# Patient Record
Sex: MALE | Race: WHITE | NOT HISPANIC OR LATINO | Employment: FULL TIME | URBAN - METROPOLITAN AREA
[De-identification: names, ages, dates, MRNs, and addresses within clinical notes are randomized per-mention and may not be internally consistent; named-entity substitution may affect disease eponyms.]

---

## 2020-04-21 ENCOUNTER — APPOINTMENT (OUTPATIENT)
Dept: RADIOLOGY | Facility: IMAGING CENTER | Age: 65
End: 2020-04-21
Attending: FAMILY MEDICINE
Payer: COMMERCIAL

## 2020-04-21 ENCOUNTER — OFFICE VISIT (OUTPATIENT)
Dept: URGENT CARE | Facility: CLINIC | Age: 65
End: 2020-04-21
Payer: COMMERCIAL

## 2020-04-21 VITALS
SYSTOLIC BLOOD PRESSURE: 160 MMHG | WEIGHT: 200 LBS | RESPIRATION RATE: 18 BRPM | DIASTOLIC BLOOD PRESSURE: 90 MMHG | OXYGEN SATURATION: 96 % | HEIGHT: 70 IN | HEART RATE: 81 BPM | BODY MASS INDEX: 28.63 KG/M2 | TEMPERATURE: 99 F

## 2020-04-21 DIAGNOSIS — R03.0 ELEVATED BLOOD PRESSURE READING: ICD-10-CM

## 2020-04-21 DIAGNOSIS — R07.81 RIB PAIN: ICD-10-CM

## 2020-04-21 PROCEDURE — 99214 OFFICE O/P EST MOD 30 MIN: CPT | Performed by: FAMILY MEDICINE

## 2020-04-21 PROCEDURE — 93000 ELECTROCARDIOGRAM COMPLETE: CPT | Performed by: FAMILY MEDICINE

## 2020-04-21 PROCEDURE — 71101 X-RAY EXAM UNILAT RIBS/CHEST: CPT | Mod: TC,FY,LT | Performed by: FAMILY MEDICINE

## 2020-04-21 ASSESSMENT — PAIN SCALES - GENERAL: PAINLEVEL: 6=MODERATE PAIN

## 2020-04-21 NOTE — PROGRESS NOTES
"Subjective:         Chief Complaint   Patient presents with   • Abdominal Pain     x3 wks pt states pain is located under rib cage on left side                  Rib Pain       Pt c/o sharp, constant left lower ribcage pain x 3 wks.    Pain is intermittent, worse with certain movements but no worse with deep breathing.       Pain  improved with aspirin.        denies dyspnea.       Pertinent negatives include no claudication, cough, exertional chest pressure, fever, nausea, near-syncope, numbness, syncope or vomiting.        Past medical history was unremarkable and not pertinent to current issue      Social History     Tobacco Use   • Smoking status: Not on file   Substance Use Topics   • Alcohol use: Not on file   • Drug use: Not on file         Family history was reviewed and not pertinent       Review of Systems:  Review of Systems   Constitutional: Negative for fever.   HENT: no neck pain, headache or dizziness  Eyes: denies vision changes  Respiratory: no cough, congestion, SOB  Cardiovascular: denies palpations     Gastrointestinal: denies diarrhea, abdominal pain or constipation.  No blood in stool.  Musculoskeletal: denies back pain or joint pain    Skin: no itching or rash  Neurological: No numbness or tingling.   10 point ROS otherwise negative, except per HPI         Objective:     /90   Pulse 81   Temp 37.2 °C (99 °F)   Resp 18   Ht 1.778 m (5' 10\")   Wt 90.7 kg (200 lb)   SpO2 96%       Physical Exam   Constitutional: pt is oriented to person, place, and time. Pt appears well-developed and well-nourished.   HENT:   Head: Normocephalic and atraumatic.   Mouth/Throat: Oropharynx is clear and moist.   Eyes: Conjunctivae and EOM are normal. Pupils are equal, round, and reactive to light. No scleral icterus.   Neck: Normal range of motion. Neck supple. No JVD present. No thyromegaly present.   Cardiovascular: Normal rate, regular rhythm, normal heart sounds and intact distal pulses.  Exam reveals " no gallop and no friction rub.    No murmur heard.  Pulmonary/Chest: Effort normal and breath sounds normal. No respiratory distress. Pt has no wheezes. Pt has no rales. Pt exhibits point tenderness over several lower ribs on left side   Abdominal: Soft.   Musculoskeletal: Normal range of motion. Pt exhibits no edema.   Lymphadenopathy:     Pt has no cervical adenopathy.   Neurological: pt is alert and oriented to person, place, and time. No cranial nerve deficit.   Skin: Skin is warm and dry. No erythema.   Psychiatric: pt has a normal mood and affect. patient's behavior is normal.          Reading Physician Reading Date Result Priority   Shanita Luna M.D.  [phone] 4/21/2020 Urgent [facility]      Narrative & Impression        4/21/2020 10:04 AM     HISTORY/REASON FOR EXAM:  Chest Pain.        TECHNIQUE/EXAM DESCRIPTION AND NUMBER OF VIEWS:  5 images of the left ribs and chest.     COMPARISON: None     FINDINGS:  The heart is not enlarged. Atherosclerotic calcification is seen.  No focal consolidation, pleural effusion or pneumothorax is identified.  Costophrenic angles are clear. There is deformity of the lateral 10th rib on the left which is likely chronic. No   displaced rib fracture is identified. Deformity of the right posterior eighth and lateral 10th rib is likely chronic.     IMPRESSION:     Deformity of the lateral 10th rib on the left is likely chronic.     No displaced rib fracture is identified.     No acute cardiopulmonary process is seen.     Atherosclerotic plaque.     Deformity of the right posterior eighth and lateral 10th rib is likely chronic.       Assessment/Plan:      1. Rib pain  Xray reviewed - there is old left 10th rib fracture, but no acute cardiopulmonary findings.       - Diclofenac Sodium (VOLTAREN) 1 % Gel; Apply 4 g to skin as directed 3 times a day as needed.  Dispense: 1 Tube; Refill: 0    2. Elevated blood pressure reading  He has no hx HTN, but strong family hx CAD  EKG was  unremarkable.     Referred to PCP  - REFERRAL TO FOLLOW-UP WITH PRIMARY CARE

## 2020-04-24 ENCOUNTER — APPOINTMENT (OUTPATIENT)
Dept: RADIOLOGY | Facility: MEDICAL CENTER | Age: 65
End: 2020-04-24
Attending: EMERGENCY MEDICINE
Payer: COMMERCIAL

## 2020-04-24 ENCOUNTER — HOSPITAL ENCOUNTER (EMERGENCY)
Facility: MEDICAL CENTER | Age: 65
End: 2020-04-24
Attending: EMERGENCY MEDICINE
Payer: COMMERCIAL

## 2020-04-24 VITALS
RESPIRATION RATE: 18 BRPM | BODY MASS INDEX: 30.33 KG/M2 | DIASTOLIC BLOOD PRESSURE: 88 MMHG | TEMPERATURE: 97.9 F | SYSTOLIC BLOOD PRESSURE: 129 MMHG | WEIGHT: 211.4 LBS | OXYGEN SATURATION: 96 % | HEART RATE: 70 BPM

## 2020-04-24 DIAGNOSIS — R10.84 GENERALIZED ABDOMINAL PAIN: ICD-10-CM

## 2020-04-24 DIAGNOSIS — K86.89 PANCREATIC MASS: ICD-10-CM

## 2020-04-24 LAB
ALBUMIN SERPL BCP-MCNC: 4 G/DL (ref 3.2–4.9)
ALBUMIN/GLOB SERPL: 1.1 G/DL
ALP SERPL-CCNC: 157 U/L (ref 30–99)
ALT SERPL-CCNC: 61 U/L (ref 2–50)
ANION GAP SERPL CALC-SCNC: 15 MMOL/L (ref 7–16)
APPEARANCE UR: CLEAR
AST SERPL-CCNC: 45 U/L (ref 12–45)
BASOPHILS # BLD AUTO: 0.7 % (ref 0–1.8)
BASOPHILS # BLD: 0.04 K/UL (ref 0–0.12)
BILIRUB SERPL-MCNC: 0.4 MG/DL (ref 0.1–1.5)
BILIRUB UR QL STRIP.AUTO: NEGATIVE
BUN SERPL-MCNC: 18 MG/DL (ref 8–22)
CALCIUM SERPL-MCNC: 8.9 MG/DL (ref 8.4–10.2)
CHLORIDE SERPL-SCNC: 99 MMOL/L (ref 96–112)
CO2 SERPL-SCNC: 20 MMOL/L (ref 20–33)
COLOR UR: YELLOW
CREAT SERPL-MCNC: 0.83 MG/DL (ref 0.5–1.4)
EOSINOPHIL # BLD AUTO: 0.04 K/UL (ref 0–0.51)
EOSINOPHIL NFR BLD: 0.7 % (ref 0–6.9)
ERYTHROCYTE [DISTWIDTH] IN BLOOD BY AUTOMATED COUNT: 40.5 FL (ref 35.9–50)
GLOBULIN SER CALC-MCNC: 3.7 G/DL (ref 1.9–3.5)
GLUCOSE SERPL-MCNC: 138 MG/DL (ref 65–99)
GLUCOSE UR STRIP.AUTO-MCNC: NEGATIVE MG/DL
HCT VFR BLD AUTO: 47.7 % (ref 42–52)
HGB BLD-MCNC: 16.1 G/DL (ref 14–18)
IMM GRANULOCYTES # BLD AUTO: 0.03 K/UL (ref 0–0.11)
IMM GRANULOCYTES NFR BLD AUTO: 0.5 % (ref 0–0.9)
KETONES UR STRIP.AUTO-MCNC: NEGATIVE MG/DL
LEUKOCYTE ESTERASE UR QL STRIP.AUTO: NEGATIVE
LIPASE SERPL-CCNC: 35 U/L (ref 7–58)
LYMPHOCYTES # BLD AUTO: 0.86 K/UL (ref 1–4.8)
LYMPHOCYTES NFR BLD: 14.7 % (ref 22–41)
MCH RBC QN AUTO: 29.2 PG (ref 27–33)
MCHC RBC AUTO-ENTMCNC: 33.8 G/DL (ref 33.7–35.3)
MCV RBC AUTO: 86.6 FL (ref 81.4–97.8)
MICRO URNS: NORMAL
MONOCYTES # BLD AUTO: 0.51 K/UL (ref 0–0.85)
MONOCYTES NFR BLD AUTO: 8.7 % (ref 0–13.4)
NEUTROPHILS # BLD AUTO: 4.38 K/UL (ref 1.82–7.42)
NEUTROPHILS NFR BLD: 74.7 % (ref 44–72)
NITRITE UR QL STRIP.AUTO: NEGATIVE
NRBC # BLD AUTO: 0 K/UL
NRBC BLD-RTO: 0 /100 WBC
PH UR STRIP.AUTO: 5.5 [PH] (ref 5–8)
PLATELET # BLD AUTO: 249 K/UL (ref 164–446)
PMV BLD AUTO: 8.6 FL (ref 9–12.9)
POTASSIUM SERPL-SCNC: 4 MMOL/L (ref 3.6–5.5)
PROT SERPL-MCNC: 7.7 G/DL (ref 6–8.2)
PROT UR QL STRIP: NEGATIVE MG/DL
RBC # BLD AUTO: 5.51 M/UL (ref 4.7–6.1)
RBC UR QL AUTO: NEGATIVE
SODIUM SERPL-SCNC: 134 MMOL/L (ref 135–145)
SP GR UR REFRACTOMETRY: 1.03
WBC # BLD AUTO: 5.9 K/UL (ref 4.8–10.8)

## 2020-04-24 PROCEDURE — 81003 URINALYSIS AUTO W/O SCOPE: CPT

## 2020-04-24 PROCEDURE — 99284 EMERGENCY DEPT VISIT MOD MDM: CPT

## 2020-04-24 PROCEDURE — 700102 HCHG RX REV CODE 250 W/ 637 OVERRIDE(OP): Performed by: EMERGENCY MEDICINE

## 2020-04-24 PROCEDURE — 74177 CT ABD & PELVIS W/CONTRAST: CPT

## 2020-04-24 PROCEDURE — 85025 COMPLETE CBC W/AUTO DIFF WBC: CPT

## 2020-04-24 PROCEDURE — 700117 HCHG RX CONTRAST REV CODE 255: Performed by: EMERGENCY MEDICINE

## 2020-04-24 PROCEDURE — A9270 NON-COVERED ITEM OR SERVICE: HCPCS | Performed by: EMERGENCY MEDICINE

## 2020-04-24 PROCEDURE — 83690 ASSAY OF LIPASE: CPT

## 2020-04-24 PROCEDURE — 36415 COLL VENOUS BLD VENIPUNCTURE: CPT

## 2020-04-24 PROCEDURE — 80053 COMPREHEN METABOLIC PANEL: CPT

## 2020-04-24 RX ORDER — PANTOPRAZOLE SODIUM 20 MG/1
20 TABLET, DELAYED RELEASE ORAL DAILY
Qty: 30 TAB | Refills: 0 | Status: SHIPPED | OUTPATIENT
Start: 2020-04-24 | End: 2021-03-31

## 2020-04-24 RX ADMIN — LIDOCAINE HYDROCHLORIDE 30 ML: 20 SOLUTION OROPHARYNGEAL at 07:19

## 2020-04-24 RX ADMIN — IOHEXOL 100 ML: 350 INJECTION, SOLUTION INTRAVENOUS at 08:24

## 2020-04-24 NOTE — ED TRIAGE NOTES
"Chief Complaint   Patient presents with   • Abdominal Pain     x 2 weeks, just \"bugs me,\" reports took 8 ASA last NOC to try to alleviate pain     /94   Pulse 75   Temp 36.6 °C (97.9 °F) (Temporal)   Resp 18   Wt 95.9 kg (211 lb 6.4 oz)   SpO2 97%   BMI 30.33 kg/m²     Pt denies N/V.  Covid Screen (-)  "

## 2020-04-24 NOTE — ED NOTES
Reviewed discharge instructions and printed prescription x 1 w/ pt, addressed multiple questions r/t medication and follow up care, denied further questions/concerns.  Pt ambulated from ED.

## 2020-04-24 NOTE — ED NOTES
PIV placed in RAC.  Explained in detail process for CT Scan.  Pt reports has never had a PIV or CT Scan.  Pt denied further questions/concerns.

## 2020-04-24 NOTE — ED NOTES
Whiteboard updated.  Blood drawn and sent to lab.  Pt updated on POC including pending tests and chart review by ERP.

## 2020-04-24 NOTE — ED PROVIDER NOTES
"ED Provider Note    CHIEF COMPLAINT  Chief Complaint   Patient presents with   • Abdominal Pain     x 2 weeks, just \"bugs me,\" reports took 8 ASA last NOC to try to alleviate pain       HPI  Joe Morelos is a 64 y.o. male who presents to the Emergency Department with a chief complaint of flank pain.   The pain is generalizedand started  2 weeks ago.  The pain does not migrate to going.   The patient describes the pain as sharp, dull and feels like \"everyting.  There is no associated fever.   There is no associated vomiting, anorexia, weight loss, change in bowel movements.   There is no history of kidney stones.  There is no h/o significant alcohol ingestion.  The patient has not been coughing.  He did go to the urgent care for rib pain and was noted to have and old rib fractures.  He states he walks 6 miles a day which makes the pain better..          REVIEW OF SYSTEMS   As above all other systems are negative.    PAST MEDICAL HISTORY  Rib fracture    FAMILY HISTORY  History reviewed. No pertinent family history.     SOCIAL HISTORY  Social History     Tobacco Use   • Smoking status: Never Smoker   Substance and Sexual Activity   • Alcohol use: Yes   • Drug use: Yes     Comment: Marijuana   • Sexual activity: Not on file   Occasional alcohol \"[place] coffee\"    SURGICAL HISTORY  patient denies any surgical history      CURRENT MEDICATIONS  Reviewed.  See Encounter Summary.  Include   No current facility-administered medications on file prior to encounter.      Current Outpatient Medications on File Prior to Encounter   Medication Sig Dispense Refill   • Diclofenac Sodium (VOLTAREN) 1 % Gel Apply 4 g to skin as directed 3 times a day as needed. 1 Tube 0       ALLERGIES  No Known Allergies    PHYSICAL EXAM  VITAL SIGNS: /88   Pulse 70   Temp 36.6 °C (97.9 °F) (Temporal)   Resp 18   Wt 95.9 kg (211 lb 6.4 oz)   SpO2 96%   BMI 30.33 kg/m²   Constitutional:  Alert and in no apparent distress.  HENT: " Normocephalic, Bilateral external ears normal. Nose normal.   Eyes: Pupils are equal and reactive. Conjunctiva normal, non-icteric.   Cardiovascular:  Good Perfusion  Thorax & Lungs: Easy unlabored respirations  Abdomen:  No gross signs of peritonitis, no pain with movement, no tenderness to palpation  Skin: Visualized skin is  Dry, No erythema, No rash.   Extremities:   Moves all extremities   Neurologic: Alert, Grossly non-focal.   Psychiatric: Appropriate Mood        RADIOLOGY/PROCEDURES  Imaging Studies:    CT-ABDOMEN-PELVIS WITH   Final Result         1.  No acute inflammatory process in the abdomen or pelvis.   2.  A 3.1 cm cystic structure in the pancreatic uncinate process, which may represent an IPMN or a pseudocyst related to a prior episode of pancreatitis. It can be followed with contrast-enhanced MRCP.   3.  Colonic diverticulosis. No diverticulitis.   4.  Prostatomegaly.            Pertinent Labs   Results for orders placed or performed during the hospital encounter of 04/24/20   CBC WITH DIFFERENTIAL   Result Value Ref Range    WBC 5.9 4.8 - 10.8 K/uL    RBC 5.51 4.70 - 6.10 M/uL    Hemoglobin 16.1 14.0 - 18.0 g/dL    Hematocrit 47.7 42.0 - 52.0 %    MCV 86.6 81.4 - 97.8 fL    MCH 29.2 27.0 - 33.0 pg    MCHC 33.8 33.7 - 35.3 g/dL    RDW 40.5 35.9 - 50.0 fL    Platelet Count 249 164 - 446 K/uL    MPV 8.6 (L) 9.0 - 12.9 fL    Neutrophils-Polys 74.70 (H) 44.00 - 72.00 %    Lymphocytes 14.70 (L) 22.00 - 41.00 %    Monocytes 8.70 0.00 - 13.40 %    Eosinophils 0.70 0.00 - 6.90 %    Basophils 0.70 0.00 - 1.80 %    Immature Granulocytes 0.50 0.00 - 0.90 %    Nucleated RBC 0.00 /100 WBC    Neutrophils (Absolute) 4.38 1.82 - 7.42 K/uL    Lymphs (Absolute) 0.86 (L) 1.00 - 4.80 K/uL    Monos (Absolute) 0.51 0.00 - 0.85 K/uL    Eos (Absolute) 0.04 0.00 - 0.51 K/uL    Baso (Absolute) 0.04 0.00 - 0.12 K/uL    Immature Granulocytes (abs) 0.03 0.00 - 0.11 K/uL    NRBC (Absolute) 0.00 K/uL   COMP METABOLIC PANEL    Result Value Ref Range    Sodium 134 (L) 135 - 145 mmol/L    Potassium 4.0 3.6 - 5.5 mmol/L    Chloride 99 96 - 112 mmol/L    Co2 20 20 - 33 mmol/L    Anion Gap 15.0 7.0 - 16.0    Glucose 138 (H) 65 - 99 mg/dL    Bun 18 8 - 22 mg/dL    Creatinine 0.83 0.50 - 1.40 mg/dL    Calcium 8.9 8.4 - 10.2 mg/dL    AST(SGOT) 45 12 - 45 U/L    ALT(SGPT) 61 (H) 2 - 50 U/L    Alkaline Phosphatase 157 (H) 30 - 99 U/L    Total Bilirubin 0.4 0.1 - 1.5 mg/dL    Albumin 4.0 3.2 - 4.9 g/dL    Total Protein 7.7 6.0 - 8.2 g/dL    Globulin 3.7 (H) 1.9 - 3.5 g/dL    A-G Ratio 1.1 g/dL   LIPASE   Result Value Ref Range    Lipase 35 7 - 58 U/L   URINALYSIS CULTURE, IF INDICATED   Result Value Ref Range    Color Yellow     Character Clear     Ph 5.5 5.0 - 8.0    Glucose Negative Negative mg/dL    Ketones Negative Negative mg/dL    Protein Negative Negative mg/dL    Bilirubin Negative Negative    Nitrite Negative Negative    Leukocyte Esterase Negative Negative    Occult Blood Negative Negative    Micro Urine Req see below    REFRACTOMETER SG   Result Value Ref Range    Specific Gravity 1.028    ESTIMATED GFR   Result Value Ref Range    GFR If African American >60 >60 mL/min/1.73 m 2    GFR If Non African American >60 >60 mL/min/1.73 m 2             COURSE & MEDICAL DECISION MAKING  Nursing notes and vital signs were reviewed. (See chart for details)  The patients prior  records were reviewed, history was obtained from the patient    The patient presents with flank pain, and the differential diagnosis includes but is not limited to ureteral stone, AAA, pyelonephritis, or associated musculoskeletal back pain .       Initial orders in the Emergency Department included CBC CMP lipase urinalysis and CT abdomen    ED testing reveals no acute surgical abdomen finding.  The patient has no evidence of colitis.  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  I discussed this with Dr. Kerr  of digestive health and he will have the patient seen in their office.  We will empirically place the patient on a PPI for his abdominal pain.  [facility] will call the patient to establish outpatient follow-up    FINAL IMPRESSION  Abdominal Pain  Pancreatic Mass  .        DISPOSITION  Home in stable condition      DISPOSITION:  Patient will be discharged home in stable condition.    FOLLOW UP:  Guerrero Leach D.O.  [address] [place] [address]  [phone]            OUTPATIENT MEDICATIONS:  New Prescriptions    PANTOPRAZOLE (PROTONIX) 20 MG TABLET    Take 1 Tab by mouth every day.         The patient was discharged home with an information sheet on abdominal pain and told to return immediately for any signs or symptoms listed, but specifically if intractable pain, or any worsening at all.  The patient verbally agreed to the discharge precautions and follow-up plan which is documented in EPIC.    Electronically signed by: Nissa Mcclure M.D., 4/24/2020 7:36 AM

## 2020-04-24 NOTE — ED NOTES
Med Rec completed per patient over the phone after discharged (called back)  Allergies reviewed  No ORAL antibiotics in last 14 days

## 2020-07-02 ENCOUNTER — TELEPHONE (OUTPATIENT)
Dept: SCHEDULING | Facility: IMAGING CENTER | Age: 65
End: 2020-07-02

## 2020-07-18 ENCOUNTER — OFFICE VISIT (OUTPATIENT)
Dept: URGENT CARE | Facility: CLINIC | Age: 65
End: 2020-07-18
Payer: COMMERCIAL

## 2020-07-18 VITALS
WEIGHT: 195 LBS | DIASTOLIC BLOOD PRESSURE: 80 MMHG | HEIGHT: 70 IN | BODY MASS INDEX: 27.92 KG/M2 | HEART RATE: 73 BPM | SYSTOLIC BLOOD PRESSURE: 130 MMHG | TEMPERATURE: 97.9 F | OXYGEN SATURATION: 98 %

## 2020-07-18 DIAGNOSIS — H18.891 CORNEAL IRRITATION OF RIGHT EYE: ICD-10-CM

## 2020-07-18 PROCEDURE — 99204 OFFICE O/P NEW MOD 45 MIN: CPT | Performed by: PHYSICIAN ASSISTANT

## 2020-07-18 RX ORDER — OFLOXACIN 3 MG/ML
1 SOLUTION/ DROPS OPHTHALMIC EVERY 4 HOURS
Qty: 50 ML | Refills: 0 | Status: SHIPPED | OUTPATIENT
Start: 2020-07-18 | End: 2020-07-25

## 2020-07-18 ASSESSMENT — ENCOUNTER SYMPTOMS
NAUSEA: 0
DOUBLE VISION: 0
EYE REDNESS: 1
VOMITING: 0
EYE DISCHARGE: 1
FEVER: 0
COUGH: 0
BLURRED VISION: 0
HEADACHES: 0
FOREIGN BODY SENSATION: 1
EYE ITCHING: 0
EYE PAIN: 1
SORE THROAT: 0
SHORTNESS OF BREATH: 0

## 2020-07-18 ASSESSMENT — VISUAL ACUITY: OU: 1

## 2020-07-18 ASSESSMENT — FIBROSIS 4 INDEX: FIB4 SCORE: 1.48

## 2020-07-18 NOTE — PROGRESS NOTES
Subjective:      Joe Morelos is a 64 y.o. male who presents with Eye Problem (x3days, right eye problem, redness/swelling, red eye,  )        Eye Problem    The right eye is affected. This is a new problem. Episode onset: x 3 days ago. The problem occurs constantly. The problem has been unchanged. There was no injury mechanism. The pain is mild. He does not wear contacts. Associated symptoms include an eye discharge (The patient reports associated watery discharge.), eye redness and a foreign body sensation. Pertinent negatives include no blurred vision, double vision, fever, itching, nausea or vomiting. He has tried eye drops for the symptoms.     PMH:  has no past medical history on file.  MEDS:   Current Outpatient Medications:   •  pantoprazole (PROTONIX) 20 MG tablet, Take 1 Tab by mouth every day., Disp: 30 Tab, Rfl: 0  ALLERGIES: No Known Allergies  SURGHX: History reviewed. No pertinent surgical history.  SOCHX:  reports that he has never smoked. He has never used smokeless tobacco. He reports current alcohol use. He reports current drug use.  FH: Family history was reviewed, no pertinent findings to report    Review of Systems   Constitutional: Negative for fever.   HENT: Negative for congestion, ear pain and sore throat.    Eyes: Positive for pain (The patient reports associated eye irritation.), discharge (The patient reports associated watery discharge.) and redness. Negative for blurred vision, double vision and itching.   Respiratory: Negative for cough and shortness of breath.    Cardiovascular: Negative for chest pain and leg swelling.   Gastrointestinal: Negative for nausea and vomiting.   Musculoskeletal: Negative for joint pain.   Skin: Negative for rash.   Neurological: Negative for headaches.   All other systems reviewed and are negative.         Objective:     /80 (BP Location: Left arm, Patient Position: Sitting, BP Cuff Size: Adult)   Pulse 73   Temp 36.6 °C (97.9 °F)  "(Temporal)   Ht 1.778 m (5' 10\")   Wt 88.5 kg (195 lb)   SpO2 98%   BMI 27.98 kg/m²      Physical Exam  Constitutional:       General: He is not in acute distress.     Appearance: Normal appearance. He is well-developed.   HENT:      Head: Normocephalic and atraumatic.      Right Ear: External ear normal.      Left Ear: External ear normal.      Nose: Nose normal.   Eyes:      General: Lids are normal. Lids are everted, no foreign bodies appreciated. Vision grossly intact.         Right eye: No foreign body or discharge.      Extraocular Movements: Extraocular movements intact.      Conjunctiva/sclera:      Right eye: Right conjunctiva is injected. Chemosis (slight) present.      Pupils: Pupils are equal, round, and reactive to light.      Comments:   Examined the patient's right eye with fluorescein staining.  No fluorescein uptake was appreciated.   Neck:      Musculoskeletal: Normal range of motion and neck supple.   Cardiovascular:      Rate and Rhythm: Normal rate.   Pulmonary:      Effort: Pulmonary effort is normal.   Skin:     General: Skin is warm and dry.   Neurological:      Mental Status: He is alert and oriented to person, place, and time.                 Assessment/Plan:     1. Corneal irritation of right eye  - ofloxacin (OCUFLOX) 0.3 % Solution; Place 1 Drop in right eye every 4 hours for 7 days.  Dispense: 50 mL; Refill: 0    The patient's presenting symptoms and physical exam findings are consistent with corneal irritation of the right eye.  On physical exam, the patient's right conjunctiva was injected with slight chemosis.  No discharge was appreciated.  No foreign body was noted.  The patient's extraocular movements are intact, and his pupils are equal round reactive to light.  The patient's right eye was examined with fluorescein staining.  No fluorescein uptake was appreciated.  The cause of the patient's corneal irritation is unknown at this time.  Informed the patient of the limitations " of evaluating the eye in the urgent care setting.  Advised the patient a complete eye exam could not be obtained at this time.  The patient verbalized understanding.  Will prescribe the patient Ofloxacin antibiotic eyedrops for his current symptoms.  Instructed the patient to follow-up with Optometry on Monday.  Provided the patient with the contact information for [facility].  Recommend OTC medications and supportive care for symptomatic management.  Discussed STRICT ED precautions with the patient, and he verbalized understanding.    Differential diagnoses, supportive care, and indications for immediate follow-up discussed with patient.   Instructed to return to clinic or nearest emergency department for any change in condition, further concerns, or worsening of symptoms.    OTC Tylenol or Motrin for fever/discomfort.  Avoid touching eyes  Hand Hygiene   Follow-up with PCP  AVS printed  Return to clinic or go to the ED if symptoms worsen or fail to improve, or if patient should develop worsening/increasing/persistent eye redness, eye drainage, eye pain, eye itchiness, vision changes, periorbital redness or swelling, headache, nausea/vomiting, fever/chills, and/or any concerning symptoms.    Discussed plan with the patient, and he agrees to the above.    Please note that this dictation was created using voice recognition software. I have made every reasonable attempt to correct obvious errors, but I expect that there may be errors of grammar and possibly content that I did not discover before finalizing the note.

## 2020-07-18 NOTE — PATIENT INSTRUCTIONS
Bacterial Conjunctivitis, Adult  Bacterial conjunctivitis is an infection of the clear membrane that covers the white part of your eye and the inner surface of your eyelid (conjunctiva). When the blood vessels in your conjunctiva become inflamed, your eye becomes red or pink, and it will probably feel itchy. Bacterial conjunctivitis spreads very easily from person to person (is contagious). It also spreads easily from one eye to the other eye.  What are the causes?  This condition is caused by bacteria. You may get the infection if you come into close contact with:  · A person who is infected with the bacteria.  · Items that are contaminated with the bacteria, such as a face towel, contact lens solution, or eye makeup.  What increases the risk?  You are more likely to develop this condition if you:  · Are exposed to other people who have the infection.  · Wear contact lenses.  · Have a sinus infection.  · Have had a recent eye injury or surgery.  · Have a weak body defense system (immune system).  · Have a medical condition that causes dry eyes.  What are the signs or symptoms?  Symptoms of this condition include:  · Thick, yellowish discharge from the eye. This may turn into a crust on the eyelid overnight and cause your eyelids to stick together.  · Tearing or watery eyes.  · Itchy eyes.  · Burning feeling in your eyes.  · Eye redness.  · Swollen eyelids.  · Blurred vision.  How is this diagnosed?  This condition is diagnosed based on your symptoms and medical history. Your health care provider may also take a sample of discharge from your eye to find the cause of your infection. This is rarely done.  How is this treated?  This condition may be treated with:  · Antibiotic eye drops or ointment to clear the infection more quickly and prevent the spread of infection to others.  · Oral antibiotic medicines to treat infections that do not respond to drops or ointments or that last longer than 10 days.  · Cool, wet  cloths (cool compresses) placed on the eyes.  · Artificial tears applied 2-6 times a day.  Follow these instructions at home:  Medicines  · Take or apply your antibiotic medicine as told by your health care provider. Do not stop taking or applying the antibiotic even if you start to feel better.  · Take or apply over-the-counter and prescription medicines only as told by your health care provider.  · Be very careful to avoid touching the edge of your eyelid with the eye-drop bottle or the ointment tube when you apply medicines to the affected eye. This will keep you from spreading the infection to your other eye or to other people.  Managing discomfort  · Gently wipe away any drainage from your eye with a warm, wet washcloth or a cotton ball.  · Apply a clean, cool compress to your eye for 10-20 minutes, 3-4 times a day.  General instructions  · Do not wear contact lenses until the inflammation is gone and your health care provider says it is safe to wear them again. Ask your health care provider how to sterilize or replace your contact lenses before you use them again. Wear glasses until you can resume wearing contact lenses.  · Avoid wearing eye makeup until the inflammation is gone. Throw away any old eye cosmetics that may be contaminated.  · Change or wash your pillowcase every day.  · Do not share towels or washcloths. This may spread the infection.  · Wash your hands often with soap and water. Use paper towels to dry your hands.  · Avoid touching or rubbing your eyes.  · Do not drive or use heavy machinery if your vision is blurred.  Contact a health care provider if:  · You have a fever.  · Your symptoms do not get better after 10 days.  Get help right away if you have:  · A fever and your symptoms suddenly get worse.  · Severe pain when you move your eye.  · Facial pain, redness, or swelling.  · Sudden loss of vision.  Summary  · Bacterial conjunctivitis is an infection of the clear membrane that covers the  white part of your eye and the inner surface of your eyelid (conjunctiva).  · Bacterial conjunctivitis spreads very easily from person to person (is contagious).  · Wash your hands often with soap and water. Use paper towels to dry your hands.  · Take or apply your antibiotic medicine as told by your health care provider. Do not stop taking or applying the antibiotic even if you start to feel better.  · Contact a health care provider if you have a fever or your symptoms do not get better after 10 days.  This information is not intended to replace advice given to you by your health care provider. Make sure you discuss any questions you have with your health care provider.  Document Released: 12/18/2006 Document Revised: 04/07/2020 Document Reviewed: 07/24/2019  Elsevier Patient Education © 2020 [employer]

## 2020-07-28 ENCOUNTER — OFFICE VISIT (OUTPATIENT)
Dept: MEDICAL GROUP | Facility: MEDICAL CENTER | Age: 65
End: 2020-07-28
Payer: COMMERCIAL

## 2020-07-28 VITALS
WEIGHT: 188.8 LBS | SYSTOLIC BLOOD PRESSURE: 112 MMHG | DIASTOLIC BLOOD PRESSURE: 74 MMHG | RESPIRATION RATE: 18 BRPM | TEMPERATURE: 96.6 F | OXYGEN SATURATION: 95 % | BODY MASS INDEX: 27.03 KG/M2 | HEIGHT: 70 IN | HEART RATE: 72 BPM

## 2020-07-28 DIAGNOSIS — Z11.59 NEED FOR HEPATITIS C SCREENING TEST: ICD-10-CM

## 2020-07-28 DIAGNOSIS — Z12.5 PROSTATE CANCER SCREENING: ICD-10-CM

## 2020-07-28 DIAGNOSIS — K86.9 PANCREATIC LESION: ICD-10-CM

## 2020-07-28 DIAGNOSIS — R73.9 HYPERGLYCEMIA: ICD-10-CM

## 2020-07-28 DIAGNOSIS — K27.9 PEPTIC ULCER DISEASE: ICD-10-CM

## 2020-07-28 PROCEDURE — 99214 OFFICE O/P EST MOD 30 MIN: CPT | Performed by: FAMILY MEDICINE

## 2020-07-28 ASSESSMENT — FIBROSIS 4 INDEX: FIB4 SCORE: 1.48

## 2020-07-28 ASSESSMENT — PATIENT HEALTH QUESTIONNAIRE - PHQ9: CLINICAL INTERPRETATION OF PHQ2 SCORE: 0

## 2020-07-28 NOTE — ASSESSMENT & PLAN NOTE
"The patient presented to the ER 4/24/20 for abdominal pain. His symptoms were secondary to PUD per above. His ER workup including a abdominal CT scan which revealed \"3.1 cm cystic structure in the pancreatic uncinate process, which may represent an IPMN or a pseudocyst related to a prior episode of pancreatitis. It can be followed with contrast-enhanced MRCP.\"    The patient has an MRCP scheduled for Oct. 1st 2020.  "

## 2020-07-28 NOTE — ASSESSMENT & PLAN NOTE
This is a chronic problem.  The patient has been working diligently on weight loss for the past 3 months.  He has lost 22 pounds.  He would like to lose 8 more pounds. He walks five miles daily and eats a very healthy diet with no processed foods or refined carbohydrates.

## 2020-07-28 NOTE — ASSESSMENT & PLAN NOTE
The patient was diagnosed with PUD about 3 months ago. He initially presented to the ED with abdominal pain and subsequently had an outpatient EGD with [facility] which demonstrated multiple ulcers per patient report. He finished 3 months of pantoprazole and sucralfate and is no longer on any medications.  He denies epigastric or abdominal pain.  He is having normal bowel movements.  No nausea, vomiting, melena, or hematochezia.

## 2020-07-28 NOTE — PROGRESS NOTES
"Subjective:     CC:  Diagnoses of Peptic ulcer disease, BMI 27.0-27.9,adult, Pancreatic lesion, Hyperglycemia, Prostate cancer screening, and Need for hepatitis C screening test were pertinent to this visit.    HISTORY OF THE PRESENT ILLNESS: Patient is a 64 y.o. male. He is here today to establish care. He is a retired , now owns a small bank, loves to travel. He lost his wife 30 years ago and has no children. He is due for a colonoscopy and PSA. Patient may have had colonoscopy with [facility] recently, he is unsure. We will request records.  Prior PCP: None.    Peptic ulcer disease  The patient was diagnosed with PUD about 3 months ago. He initially presented to the ED with abdominal pain and subsequently had an outpatient EGD with [facility] which demonstrated multiple ulcers per patient report. He finished 3 months of pantoprazole and sucralfate and is no longer on any medications.  He denies epigastric or abdominal pain.  He is having normal bowel movements.  No nausea, vomiting, melena, or hematochezia.    BMI 27.0-27.9,adult  This is a chronic problem.  The patient has been working diligently on weight loss for the past 3 months.  He has lost 22 pounds.  He would like to lose 8 more pounds. He walks five miles daily and eats a very healthy diet with no processed foods or refined carbohydrates.    Pancreatic lesion  The patient presented to the ER 4/24/20 for abdominal pain. His symptoms were secondary to PUD per above. His ER workup including a abdominal CT scan which revealed \"3.1 cm cystic structure in the pancreatic uncinate process, which may represent an IPMN or a pseudocyst related to a prior episode of pancreatitis. It can be followed with contrast-enhanced MRCP.\"    The patient has an MRCP scheduled for Oct. 1st 2020.      Allergies: Patient has no known allergies.    Current Outpatient Medications Ordered in Epic   Medication Sig Dispense Refill   • pantoprazole " "(PROTONIX) 20 MG tablet Take 1 Tab by mouth every day. 30 Tab 0     No current Epic-ordered facility-administered medications on file.        History reviewed. No pertinent past medical history.    History reviewed. No pertinent surgical history.    Social History     Tobacco Use   • Smoking status: Never Smoker   • Smokeless tobacco: Never Used   Substance Use Topics   • Alcohol use: Not Currently   • Drug use: Not Currently     Types: Marijuana     Comment: Marijuana       Social History     Social History Narrative   • Not on file       Family History   Problem Relation Age of Onset   • Heart Disease Mother         CHF   • Heart Disease Father         bypass age 49, 75       Health Maintenance: See above    ROS:   Gen: no fevers/chills, no unintentional changes in weight  Eyes: no changes in vision  ENT: no sore throat or nasal congestion  Pulm: no sob, no cough  CV: no chest pain  GI: no nausea/vomiting, no diarrhea or constipation  : no dysuria  MSk: no myalgias  Skin: no rash  Neuro: no headaches, no numbness/tingling  Immuno: no seasonal allergies  Heme/Lymph: no easy bruising  Psych: no anxiety of depression      Objective:     Exam: /74 (BP Location: Left arm, Patient Position: Sitting, BP Cuff Size: Adult long)   Pulse 72   Temp 35.9 °C (96.6 °F) (Temporal)   Resp 18   Ht 1.778 m (5' 10\")   Wt 85.6 kg (188 lb 12.8 oz)   SpO2 95%  Body mass index is 27.09 kg/m².    General: Well-developed, well-nourished. Appears stated age.  Eyes: Normocephalic. Conjunctiva clear without injection or scleral icterus. Pupils equal and reactive to light.   HENT: Normocephalic, atraumatic. Ears normal shape and contour, canals are clear bilaterally, tympanic membranes pearly, oropharynx is clear without erythema, edema or exudates.   Neck: Supple; no obvious thyromegaly or nodules appreciated  Pulmonary: Clear to ausculation bilaterally.  No increased work of breathing. No rales, ronchi, or " wheezing.  Cardiovascular: Regular rate and rhythm without murmur.  Abdomen: Soft, nontender, nondistended. Normal bowel sounds. No guarding or rebound tenderness.  Neurologic: CN III-XII intact.  Lymph: No cervical or supraclavicular lymphadenopathy palpated.  Skin: Warm and dry.  No obvious lesions.  Musculoskeletal: Normal gait. No extremity cyanosis, clubbing, or edema.  Psych: Euthymic affect. Alert and oriented x 3. Judgment and insight is normal.      Assessment & Plan:   64 y.o. male with the following -    1. Peptic ulcer disease  Diagnosed 3 months ago, s/p 3 months of PPI + sucralfate, has stopped all medications and reports no recurrent symptoms. Following with [facility].  - Requesting records from [facility]    2. BMI 27.0-27.9,adult  This is a chronic problem. 22 pound weight loss over last 3 months. Patient's goal is 180 pounds.  - Continue daily 5 mile walk/hike  - Continue Mediterranean type diet    3. Pancreatic lesion  3cm pancreatic lesion noted incidentally on abdominal CT scan during ER visit 2020.  - Follow-up MRCP scheduled Oct. 1, 2020    4. Hyperglycemia  Noted on previous labs.  - Lipid Profile; Future  - Comp Metabolic Panel; Future  - HEMOGLOBIN A1C; Future  - CBC WITH DIFFERENTIAL; Future    5. Prostate cancer screening  - PROSTATE SPECIFIC AG SCREENING; Future    6. Need for hepatitis C screening test  - HCV Scrn ( [id number] 1xLife); Future      Return in about 1 month (around 2020) for F/U lab.    Please note this dictation was created using voice recognition software. I have made every reasonable attempt to correct obvious errors, but I expect there may be errors of grammar, and possibly content, that I did not discover before finalizing the note.

## 2020-10-23 ENCOUNTER — HOSPITAL ENCOUNTER (OUTPATIENT)
Dept: HOSPITAL 8 - STAR | Age: 65
Discharge: HOME | End: 2020-10-23
Attending: INTERNAL MEDICINE
Payer: MEDICARE

## 2020-10-23 DIAGNOSIS — Z20.828: ICD-10-CM

## 2020-10-23 DIAGNOSIS — R10.12: ICD-10-CM

## 2020-10-23 DIAGNOSIS — K86.89: ICD-10-CM

## 2020-10-23 DIAGNOSIS — Z01.812: Primary | ICD-10-CM

## 2020-10-23 PROCEDURE — 36415 COLL VENOUS BLD VENIPUNCTURE: CPT

## 2020-10-23 PROCEDURE — 87635 SARS-COV-2 COVID-19 AMP PRB: CPT

## 2020-10-23 PROCEDURE — 93005 ELECTROCARDIOGRAM TRACING: CPT

## 2020-10-27 ENCOUNTER — HOSPITAL ENCOUNTER (OUTPATIENT)
Dept: HOSPITAL 8 - OUT | Age: 65
Discharge: HOME | End: 2020-10-27
Attending: INTERNAL MEDICINE
Payer: MEDICARE

## 2020-10-27 VITALS — BODY MASS INDEX: 27.68 KG/M2 | WEIGHT: 193.35 LBS | HEIGHT: 70 IN

## 2020-10-27 DIAGNOSIS — K86.2: Primary | ICD-10-CM

## 2020-10-27 DIAGNOSIS — K80.20: ICD-10-CM

## 2020-10-27 DIAGNOSIS — R94.8: ICD-10-CM

## 2020-10-27 DIAGNOSIS — Z79.82: ICD-10-CM

## 2020-10-27 DIAGNOSIS — Z98.890: ICD-10-CM

## 2020-10-27 DIAGNOSIS — Z79.899: ICD-10-CM

## 2020-10-27 DIAGNOSIS — Z72.89: ICD-10-CM

## 2020-10-27 DIAGNOSIS — K25.9: ICD-10-CM

## 2020-10-27 PROCEDURE — 43242 EGD US FINE NEEDLE BX/ASPIR: CPT

## 2020-10-27 PROCEDURE — 88173 CYTOPATH EVAL FNA REPORT: CPT

## 2020-10-27 PROCEDURE — 82150 ASSAY OF AMYLASE: CPT

## 2020-10-27 PROCEDURE — 82378 CARCINOEMBRYONIC ANTIGEN: CPT

## 2021-01-07 ENCOUNTER — TELEPHONE (OUTPATIENT)
Dept: MEDICAL GROUP | Facility: MEDICAL CENTER | Age: 66
End: 2021-01-07

## 2021-01-07 NOTE — TELEPHONE ENCOUNTER
Phone Number Called: [phone] (home)     Call outcome: Left detailed message for patient. Informed to call back with any additional questions.    Message: Pt lvm inquiring about how to get his labs done. Called and lvm stating that he may schedule an appt with one of the [facility] labs.

## 2021-01-25 ENCOUNTER — TELEPHONE (OUTPATIENT)
Dept: MEDICAL GROUP | Facility: MEDICAL CENTER | Age: 66
End: 2021-01-25

## 2021-01-25 ENCOUNTER — HOSPITAL ENCOUNTER (OUTPATIENT)
Dept: LAB | Facility: MEDICAL CENTER | Age: 66
End: 2021-01-25
Attending: FAMILY MEDICINE
Payer: MEDICARE

## 2021-01-25 DIAGNOSIS — R73.9 HYPERGLYCEMIA: ICD-10-CM

## 2021-01-25 DIAGNOSIS — Z12.5 PROSTATE CANCER SCREENING: ICD-10-CM

## 2021-01-25 DIAGNOSIS — Z11.59 NEED FOR HEPATITIS C SCREENING TEST: ICD-10-CM

## 2021-01-25 LAB
ALBUMIN SERPL BCP-MCNC: 4.4 G/DL (ref 3.2–4.9)
ALBUMIN/GLOB SERPL: 1.3 G/DL
ALP SERPL-CCNC: 79 U/L (ref 30–99)
ALT SERPL-CCNC: 19 U/L (ref 2–50)
ANION GAP SERPL CALC-SCNC: 14 MMOL/L (ref 7–16)
AST SERPL-CCNC: 21 U/L (ref 12–45)
BASOPHILS # BLD AUTO: 0.6 % (ref 0–1.8)
BASOPHILS # BLD: 0.04 K/UL (ref 0–0.12)
BILIRUB SERPL-MCNC: 0.4 MG/DL (ref 0.1–1.5)
BUN SERPL-MCNC: 13 MG/DL (ref 8–22)
CALCIUM SERPL-MCNC: 9.5 MG/DL (ref 8.5–10.5)
CHLORIDE SERPL-SCNC: 101 MMOL/L (ref 96–112)
CHOLEST SERPL-MCNC: 242 MG/DL (ref 100–199)
CO2 SERPL-SCNC: 21 MMOL/L (ref 20–33)
CREAT SERPL-MCNC: 0.73 MG/DL (ref 0.5–1.4)
EOSINOPHIL # BLD AUTO: 0.05 K/UL (ref 0–0.51)
EOSINOPHIL NFR BLD: 0.8 % (ref 0–6.9)
ERYTHROCYTE [DISTWIDTH] IN BLOOD BY AUTOMATED COUNT: 41.5 FL (ref 35.9–50)
EST. AVERAGE GLUCOSE BLD GHB EST-MCNC: 108 MG/DL
FASTING STATUS PATIENT QL REPORTED: NORMAL
GLOBULIN SER CALC-MCNC: 3.5 G/DL (ref 1.9–3.5)
GLUCOSE SERPL-MCNC: 97 MG/DL (ref 65–99)
HBA1C MFR BLD: 5.4 % (ref 0–5.6)
HCT VFR BLD AUTO: 50.3 % (ref 42–52)
HCV AB SER QL: NORMAL
HDLC SERPL-MCNC: 40 MG/DL
HGB BLD-MCNC: 17 G/DL (ref 14–18)
IMM GRANULOCYTES # BLD AUTO: 0.04 K/UL (ref 0–0.11)
IMM GRANULOCYTES NFR BLD AUTO: 0.6 % (ref 0–0.9)
LDLC SERPL CALC-MCNC: 163 MG/DL
LYMPHOCYTES # BLD AUTO: 1.57 K/UL (ref 1–4.8)
LYMPHOCYTES NFR BLD: 25.2 % (ref 22–41)
MCH RBC QN AUTO: 29.9 PG (ref 27–33)
MCHC RBC AUTO-ENTMCNC: 33.8 G/DL (ref 33.7–35.3)
MCV RBC AUTO: 88.4 FL (ref 81.4–97.8)
MONOCYTES # BLD AUTO: 0.39 K/UL (ref 0–0.85)
MONOCYTES NFR BLD AUTO: 6.3 % (ref 0–13.4)
NEUTROPHILS # BLD AUTO: 4.13 K/UL (ref 1.82–7.42)
NEUTROPHILS NFR BLD: 66.5 % (ref 44–72)
NRBC # BLD AUTO: 0 K/UL
NRBC BLD-RTO: 0 /100 WBC
PLATELET # BLD AUTO: 288 K/UL (ref 164–446)
PMV BLD AUTO: 9.4 FL (ref 9–12.9)
POTASSIUM SERPL-SCNC: 4.2 MMOL/L (ref 3.6–5.5)
PROT SERPL-MCNC: 7.9 G/DL (ref 6–8.2)
PSA SERPL-MCNC: 3.43 NG/ML (ref 0–4)
RBC # BLD AUTO: 5.69 M/UL (ref 4.7–6.1)
SODIUM SERPL-SCNC: 136 MMOL/L (ref 135–145)
TRIGL SERPL-MCNC: 195 MG/DL (ref 0–149)
WBC # BLD AUTO: 6.2 K/UL (ref 4.8–10.8)

## 2021-01-25 PROCEDURE — 83036 HEMOGLOBIN GLYCOSYLATED A1C: CPT | Mod: GA

## 2021-01-25 PROCEDURE — 36415 COLL VENOUS BLD VENIPUNCTURE: CPT | Mod: GA

## 2021-01-25 PROCEDURE — 85025 COMPLETE CBC W/AUTO DIFF WBC: CPT

## 2021-01-25 PROCEDURE — 84153 ASSAY OF PSA TOTAL: CPT | Mod: GA

## 2021-01-25 PROCEDURE — 80053 COMPREHEN METABOLIC PANEL: CPT

## 2021-01-25 PROCEDURE — 80061 LIPID PANEL: CPT | Mod: GA

## 2021-01-25 PROCEDURE — G0472 HEP C SCREEN HIGH RISK/OTHER: HCPCS | Mod: GA

## 2021-01-25 NOTE — TELEPHONE ENCOUNTER
Phone Number Called: [phone] (home)     Call outcome: unable to leave a voicemail    Message: attempted to call patient regarding results. It rang once and voicemail was not set up. Will try again later.

## 2021-01-25 NOTE — TELEPHONE ENCOUNTER
----- Message from Mirna Anders M.D. sent at 1/25/2021  2:46 PM PST -----  Please notify patient that I have reviewed his labs which are stable. I would like to discuss his results sometime in the next 2-3 months via virtual or office visit.

## 2021-01-27 NOTE — TELEPHONE ENCOUNTER
Phone Number Called: [phone] (home)     Call outcome: Did not leave a detailed message. Requested patient to call back.    Message: lvm for pt. To call back for lab results.

## 2021-03-03 DIAGNOSIS — Z23 NEED FOR VACCINATION: ICD-10-CM

## 2021-03-08 ENCOUNTER — TELEPHONE (OUTPATIENT)
Dept: MEDICAL GROUP | Facility: MEDICAL CENTER | Age: 66
End: 2021-03-08

## 2021-03-08 NOTE — TELEPHONE ENCOUNTER
Phone Number Called: [phone] (home)     Call outcome: Spoke to patient regarding message below.    Message: pt. Called earlier stating that he wanted information on getting the covid vaccine. I called pt. Back and gave him the number to schedule an appointment for the covid vaccine.  I also sent pt. A link through [employer] and gave him the [employer] help desk number to set up an appointment.  He verbalized understanding.

## 2021-03-12 ENCOUNTER — IMMUNIZATION (OUTPATIENT)
Dept: FAMILY PLANNING/WOMEN'S HEALTH CLINIC | Facility: IMMUNIZATION CENTER | Age: 66
End: 2021-03-12
Payer: MEDICARE

## 2021-03-12 DIAGNOSIS — Z23 ENCOUNTER FOR VACCINATION: Primary | ICD-10-CM

## 2021-03-12 PROCEDURE — 91301 MODERNA SARS-COV-2 VACCINE: CPT

## 2021-03-12 PROCEDURE — 0011A MODERNA SARS-COV-2 VACCINE: CPT

## 2021-03-31 ENCOUNTER — OFFICE VISIT (OUTPATIENT)
Dept: MEDICAL GROUP | Facility: MEDICAL CENTER | Age: 66
End: 2021-03-31
Payer: MEDICARE

## 2021-03-31 VITALS
HEART RATE: 76 BPM | DIASTOLIC BLOOD PRESSURE: 78 MMHG | BODY MASS INDEX: 28.25 KG/M2 | OXYGEN SATURATION: 93 % | WEIGHT: 197.31 LBS | RESPIRATION RATE: 20 BRPM | HEIGHT: 70 IN | SYSTOLIC BLOOD PRESSURE: 110 MMHG | TEMPERATURE: 98.8 F

## 2021-03-31 DIAGNOSIS — K86.9 PANCREATIC LESION: ICD-10-CM

## 2021-03-31 DIAGNOSIS — R73.9 HYPERGLYCEMIA: ICD-10-CM

## 2021-03-31 DIAGNOSIS — K27.9 PEPTIC ULCER DISEASE: ICD-10-CM

## 2021-03-31 DIAGNOSIS — E78.5 DYSLIPIDEMIA: ICD-10-CM

## 2021-03-31 DIAGNOSIS — Z12.5 PROSTATE CANCER SCREENING: ICD-10-CM

## 2021-03-31 PROCEDURE — 99214 OFFICE O/P EST MOD 30 MIN: CPT | Performed by: FAMILY MEDICINE

## 2021-03-31 ASSESSMENT — FIBROSIS 4 INDEX: FIB4 SCORE: 1.09

## 2021-03-31 ASSESSMENT — PATIENT HEALTH QUESTIONNAIRE - PHQ9: CLINICAL INTERPRETATION OF PHQ2 SCORE: 0

## 2021-03-31 NOTE — ASSESSMENT & PLAN NOTE
This is a new problem.  Recent labs reveal elevated LDL per below.  10-year ASCVD risk approximately 12.8%.    Lab Results   Component Value Date/Time    CHOLSTRLTOT 242 (H) 01/25/2021 07:55 AM     (H) 01/25/2021 07:55 AM    HDL 40 01/25/2021 07:55 AM    TRIGLYCERIDE 195 (H) 01/25/2021 07:55 AM

## 2021-03-31 NOTE — ASSESSMENT & PLAN NOTE
"The patient presented to the ER 4/24/20 for abdominal pain. His symptoms were secondary to PUD per above. His ER workup including a abdominal CT scan which revealed \"3.1 cm cystic structure in the pancreatic uncinate process, which may represent an IPMN or a pseudocyst related to a prior episode of pancreatitis. It can be followed with contrast-enhanced MRCP.\"    MRCP on Oct. 1st 2020 - requesting records. Patient reports no concerning findings.  "

## 2021-03-31 NOTE — ASSESSMENT & PLAN NOTE
The patient was diagnosed with PUD about a year ago. He initially presented to the ED with abdominal pain and subsequently had an outpatient EGD with [facility] which demonstrated multiple ulcers per patient report. He finished 3 months of pantoprazole and sucralfate and is no longer on any medications.  He denies epigastric or abdominal pain.  He is having normal bowel movements.  No nausea, vomiting, melena, or hematochezia.

## 2021-03-31 NOTE — PROGRESS NOTES
"Subjective:     CC: g/u labs    HPI:   Joe presents today with:    Peptic ulcer disease  The patient was diagnosed with PUD about a year ago. He initially presented to the ED with abdominal pain and subsequently had an outpatient EGD with [facility] which demonstrated multiple ulcers per patient report. He finished 3 months of pantoprazole and sucralfate and is no longer on any medications.  He denies epigastric or abdominal pain.  He is having normal bowel movements.  No nausea, vomiting, melena, or hematochezia.    Pancreatic lesion  The patient presented to the ER 4/24/20 for abdominal pain. His symptoms were secondary to PUD per above. His ER workup including a abdominal CT scan which revealed \"3.1 cm cystic structure in the pancreatic uncinate process, which may represent an IPMN or a pseudocyst related to a prior episode of pancreatitis. It can be followed with contrast-enhanced MRCP.\"    MRCP on Oct. 1st 2020 - requesting records. Patient reports no concerning findings.    Dyslipidemia  This is a new problem.  Recent labs reveal elevated LDL per below.  10-year ASCVD risk approximately 12.8%.    Lab Results   Component Value Date/Time    CHOLSTRLTOT 242 (H) 01/25/2021 07:55 AM     (H) 01/25/2021 07:55 AM    HDL 40 01/25/2021 07:55 AM    TRIGLYCERIDE 195 (H) 01/25/2021 07:55 AM              History reviewed. No pertinent past medical history.    Social History     Tobacco Use   • Smoking status: Never Smoker   • Smokeless tobacco: Never Used   Substance Use Topics   • Alcohol use: Yes     Comment: once a month    • Drug use: Not Currently     Types: Marijuana     Comment: Marijuana       Current Outpatient Medications Ordered in Epic   Medication Sig Dispense Refill   • Sildenafil Citrate (VIAGRA PO) Take  by mouth.       No current Epic-ordered facility-administered medications on file.       Allergies:  Patient has no known allergies.    Health Maintenance:     ROS:  Gen: no " "fevers/chills, no changes in weight  Eyes: no changes in vision  ENT: no sore throat, no hearing loss, no bloody nose  Pulm: no SOB  CV: no chest pain        Objective:       Exam:  /78 (BP Location: Left arm, Patient Position: Sitting, BP Cuff Size: Adult)   Pulse 76   Temp 37.1 °C (98.8 °F) (Temporal)   Resp 20   Ht 1.778 m (5' 10\")   Wt 89.5 kg (197 lb 5 oz)   SpO2 93%   BMI 28.31 kg/m²  Body mass index is 28.31 kg/m².    Gen: Alert and oriented, No apparent distress  Lungs: Normal effort, CTA bilaterally, no wheezes, rhonchi, or rales  CV: Regular rate and rhythm, no murmurs, rubs, or gallops      Assessment & Plan:     65 y.o. male with the following -     1. Dyslipidemia  10 year ASCVD risk 12.8%, discussed statin therapy, patient declines and would like to manage with diet and exercise - recommended Mediterranean type diet  - Comp Metabolic Panel; Future  - Lipid Profile; Future    2. Peptic ulcer disease  Recovered, no melena or hematochezia, no epigastric pain; s/p PPI and sucralfate X 3 months  - CBC WITH DIFFERENTIAL; Future    3. Hyperglycemia  Noted on prior labs  - HEMOGLOBIN A1C; Future    4. Pancreatic lesion  - Requesting records from [facility]    5. Prostate cancer screening  - PROSTATE SPECIFIC AG SCREENING; Future    Other orders  - Sildenafil Citrate (VIAGRA PO); Take  by mouth.      Return in about 1 year (around 3/31/2022).    Please note this dictation was created using voice recognition software. I have made every reasonable attempt to correct obvious errors, but I expect there may be errors of grammar, and possibly content, that I did not discover before finalizing the note.       "

## 2021-04-10 ENCOUNTER — IMMUNIZATION (OUTPATIENT)
Dept: FAMILY PLANNING/WOMEN'S HEALTH CLINIC | Facility: IMMUNIZATION CENTER | Age: 66
End: 2021-04-10
Payer: MEDICARE

## 2021-04-10 DIAGNOSIS — Z23 ENCOUNTER FOR VACCINATION: Primary | ICD-10-CM

## 2021-04-10 PROCEDURE — 91301 MODERNA SARS-COV-2 VACCINE: CPT

## 2021-04-10 PROCEDURE — 0012A MODERNA SARS-COV-2 VACCINE: CPT

## 2021-07-31 ENCOUNTER — HOSPITAL ENCOUNTER (EMERGENCY)
Dept: HOSPITAL 8 - ED | Age: 66
Discharge: HOME | End: 2021-07-31
Payer: MEDICARE

## 2021-07-31 VITALS — DIASTOLIC BLOOD PRESSURE: 81 MMHG | SYSTOLIC BLOOD PRESSURE: 141 MMHG

## 2021-07-31 VITALS — WEIGHT: 198.42 LBS | BODY MASS INDEX: 28.41 KG/M2 | HEIGHT: 70 IN

## 2021-07-31 DIAGNOSIS — R33.9: Primary | ICD-10-CM

## 2021-07-31 DIAGNOSIS — R31.0: ICD-10-CM

## 2021-07-31 LAB
ALBUMIN SERPL-MCNC: 4.1 G/DL (ref 3.4–5)
ANION GAP SERPL CALC-SCNC: 4 MMOL/L (ref 5–15)
BASOPHILS # BLD AUTO: 0 X10^3/UL (ref 0–0.1)
BASOPHILS NFR BLD AUTO: 0 % (ref 0–1)
CALCIUM SERPL-MCNC: 9.6 MG/DL (ref 8.5–10.1)
CHLORIDE SERPL-SCNC: 105 MMOL/L (ref 98–107)
CREAT SERPL-MCNC: 0.98 MG/DL (ref 0.7–1.3)
EOSINOPHIL # BLD AUTO: 0 X10^3/UL (ref 0–0.4)
EOSINOPHIL NFR BLD AUTO: 0 % (ref 1–7)
ERYTHROCYTE [DISTWIDTH] IN BLOOD BY AUTOMATED COUNT: 14.1 % (ref 9.4–14.8)
LYMPHOCYTES # BLD AUTO: 1.3 X10^3/UL (ref 1–3.4)
LYMPHOCYTES NFR BLD AUTO: 11 % (ref 22–44)
MCH RBC QN AUTO: 30.5 PG (ref 27.5–34.5)
MCHC RBC AUTO-ENTMCNC: 34.3 G/DL (ref 33.2–36.2)
MICROSCOPIC: (no result)
MONOCYTES # BLD AUTO: 0.7 X10^3/UL (ref 0.2–0.8)
MONOCYTES NFR BLD AUTO: 6 % (ref 2–9)
NEUTROPHILS # BLD AUTO: 10 X10^3/UL (ref 1.8–6.8)
NEUTROPHILS NFR BLD AUTO: 83 % (ref 42–75)
PLATELET # BLD AUTO: 324 X10^3/UL (ref 130–400)
PMV BLD AUTO: 7 FL (ref 7.4–10.4)
RBC # BLD AUTO: 5.42 X10^6/UL (ref 4.38–5.82)

## 2021-07-31 PROCEDURE — 36415 COLL VENOUS BLD VENIPUNCTURE: CPT

## 2021-07-31 PROCEDURE — 99284 EMERGENCY DEPT VISIT MOD MDM: CPT

## 2021-07-31 PROCEDURE — 80048 BASIC METABOLIC PNL TOTAL CA: CPT

## 2021-07-31 PROCEDURE — 85025 COMPLETE CBC W/AUTO DIFF WBC: CPT

## 2021-07-31 PROCEDURE — 82040 ASSAY OF SERUM ALBUMIN: CPT

## 2021-07-31 PROCEDURE — 51702 INSERT TEMP BLADDER CATH: CPT

## 2021-07-31 PROCEDURE — 81001 URINALYSIS AUTO W/SCOPE: CPT

## 2021-07-31 NOTE — NUR
urine still bloody in batista. hand irrigated again. joseph aware. plan to cont 
hand irrigation for now. as

## 2021-07-31 NOTE — NUR
batista irrigated w sterile water per dr ruiz. stacia colored now. ua walked to 
lab. plan blood work. pt reports feeling better and wants to go home. as

## 2021-07-31 NOTE — NUR
PT HAS NOT VOIDED SINCE 1700 YEST, IN A LOT OF PAIN. LOPEZ 16 FR INSERTED PER 
DR BO. SOME BLOOD, FELT LIKE THERE WAS A SLIGHT OBSTRUCTION WHEN LOPEZ WENT 
IN. PT REPORTS IMMEDIATE RELIEF. AS

## 2021-08-02 ENCOUNTER — HOSPITAL ENCOUNTER (EMERGENCY)
Dept: HOSPITAL 8 - ED | Age: 66
Discharge: HOME | End: 2021-08-02
Payer: MEDICARE

## 2021-08-02 VITALS — DIASTOLIC BLOOD PRESSURE: 78 MMHG | SYSTOLIC BLOOD PRESSURE: 129 MMHG

## 2021-08-02 VITALS — WEIGHT: 196.43 LBS | HEIGHT: 70 IN | BODY MASS INDEX: 28.12 KG/M2

## 2021-08-02 DIAGNOSIS — N40.1: Primary | ICD-10-CM

## 2021-08-02 DIAGNOSIS — R33.8: ICD-10-CM

## 2021-08-02 LAB — MICROSCOPIC: (no result)

## 2021-08-02 PROCEDURE — 81001 URINALYSIS AUTO W/SCOPE: CPT

## 2021-08-02 PROCEDURE — 51700 IRRIGATION OF BLADDER: CPT

## 2021-08-02 PROCEDURE — 87086 URINE CULTURE/COLONY COUNT: CPT

## 2021-08-02 PROCEDURE — 99285 EMERGENCY DEPT VISIT HI MDM: CPT

## 2021-08-02 NOTE — NUR
PT YELLING AT THIS RN STATING "I WANT THIS FUCKING THING OUT, TELL THE DOCTOR 
THAT'S WHAT I WANT, I TRIED TO TAKE IT OUT AT HOME BUT NO ONE TOLD ME ABOUT THE 
BALLOON SO AS SOON AS I FELT THAT I STOPPED YANKING ON IT." DISCUSSED PT 
REQUEST WITH NATALIA KIM, AWARE, TO SEE PT AND DISCUSS.

## 2021-08-02 NOTE — NUR
BEDSIDE REPORT FROM WARD FORBES. CARE ASSUMED. PER WARD FORBES SHE "IRRIGATED CATH 
PER MD ORDER WITH 250 ML OUTPUT AND URINE SAMPLE WAS SENT" BY HER. URINARY 
DRAINAGE LEG BAG IN PLACE. PT REQUESTING "NEW LEG BAG WITH SHORTER TUBING LIKE 
BEFORE," INSISTENT "IT [THE BAG] IS NOT WORKING." NEW LEG BAG WITH SHORTER 
TUBING APPLIED PER PT REQUEST AND PROVIDER NATALIA KIM OKAY. PT UPDATED ON POC, 
AWAITING UA RESULTS. PT REQUESTING "THIS DAMN CATHETER JUST BE REMOVED, I'VE 
BEEN PEEING AROUND IT FOR HOURS, I'M FINE NOW." NATALIA KIM AWARE OF PT REQUEST. 
NO NEW ORDERS RECEIVED AT THIS TIME. CALL LIGHT IN REACH. FALL PRECAUTIONS IN 
PLACE. VSS. A&OX4.

## 2021-08-02 NOTE — NUR
PT VOIDED ADDITIONAL 100ML BLOODY URINE, NO CLOTS NOTED. BLADDER SCAN, PVR 
358ML. DISCUSSED WITH NATALIA KIM. PT ADAMENT "I WANT TO GO HOME, I DO NOT WANT 
THE LOPEZ, I WILL COME BACK IF I NEED TOO, I'M TELLING YOU I FEEL SO MUCH 
BETTER NOW THAT THE LOPEZ IS OUT." PT STILL HAS DISTENTION AND FIRMNESS NOTED, 
ALTHOUGH IMPROVED FROM INITIAL ASSESSMENT. NATALIA KIM DISCUSSED POC AND RISKS 
WITH PT, PT VERBALIZED UNDERSTANDING AND CONTINUES TO REFUSE LOPEZ FOR BLADDER 
DRAINAGE/RELIEF, PT STATES "I WANT TO GO HOME, I WILL COME BACK WHEN AND IF I 
NEED TO, FOR NOW I FEEL BETTER, NO PAIN, I CAN SIT DOWN WITHOUT PAIN. I'M READY 
TO GO."

## 2021-08-02 NOTE — NUR
PT BIB SELF VIA POV. PER PT HE HAD A CATHETER PLACED 3 DAYS AGO AT THIS ED, AND 
HE TRIED TO PULL IT OUT TODAY BECAUSE IT WAS UNCOMFORTABLE, NOW PT STATES THERE 
IS DRAINAGE OF URINE AROUND THE CATHETER. PT STATES "CAN'T YOU JUST PULL IT OUT 
NOW". PT EDUCATED THAT WE ARE AWAITING EDMD EVAL. PT RESTING IN [facility], 
MONITORING IN PLACE, NADN AT THIS TIME, WCCASSANDRA.

## 2021-08-02 NOTE — NUR
late note for 1030.  Triage RN:



while attempting to triage this patient, pt agitated and uncooperative with 
attempting to collect VS.  



pt states "You need to taking this thing out now!"  attempting to calm pt and 
orient him to triage process.  pt pacing in triage office.  pt swearing.  pt 
states "just fucking take it out!  I can pee just fine! I am peeing around the 
tube, so I don't need it!"  pt states that he is angry that he cannot follow up 
with urologist for 3 months.

## 2021-08-02 NOTE — NUR
PT VOIDED 200ML BLOODY URINE, DENIES PAIN OR DIFFICULTY URINATING. PT 
REQUESTING TO GO HOME. REPORTS "I'M SO HAPPY I COULD GO, IT FEELS SO MUCH 
BETTER, THE FLOW IS GOOD, I WILL TAKE MY FLOMAX, MY STOMACH DOESN'T FEEL AS 
FIRM AND DISTENDED EITHER. I WANT TO GO HOME." NATALIA KIM AT BEDSIDE DISCUSSING 
DISCHARGE POC WITH PT.

## 2021-08-02 NOTE — NUR
LOPEZ CATH REMOVED WITHOUT ANY DIFFICULTY OR RESISTANCE PER PT REQUEST AND 
NATALIA KIM OKAY. PT GIVEN URINAL TO ATTEMPT TO VOID. ALVERTO/SHANE AGUILAR TECHS AT 
BEDSIDE AS CHAPARONE DURING LOPEZ REMOVAL PER PT OKAY.



LATE ENTRY 1430: IN TO IRRIGATE LOPEZ WITH LUDA RN, PT VOIDING ON FLOOR 
AROUND CATH, SCANT DRAINAGE INTO LEG BAD, PUDDLE OF URINE ON FLOOR. DISCUSSED 
WITH NATALIA KIM, BLADDER SCANNED, RESULT 269, ABD WITH DISTENTION AND FIRMNESS, 
PT STATES "I FEEL LIKE I COULD PEE MORE." RESULTS DISCUSSED WITH NATALIA KIM, 
100ML HAND IRRIGATION COMPLETED WITHOUT ANY RESISTANCE OR PAIN PER PT. PT 
IMMEDIATELY VOIDED LARGE AMOUNT AROUND LOPEZ ONTO IRINA PAD ON BEDDING AND 
MINIMAL DRAINAGE FROM LOPEZ TUBING. BLADDER SCANNED, RESULT 419. DISCUSSED WITH 
NATALIA KIM AND PER PT CONTINUAL REQUEST LOPEZ CATH TO BE REMOVED.

## 2021-08-02 NOTE — NUR
PT REFUSING IRRIGATION "WHY CAN'T WE JUST TAKE THIS FUCKING THING OUT, I'M 
TELLING YOU IT'S NOT WORKING RIGHT, I'M PEEING AROUND IT, TELL THE DOCTOR, GOD 
MARIA TERESA, I'M SORRY THIS IS JUST MAKING ME ANGRY." DISCUSSED WITH NATALIA KIM, WHO 
SPOKE WITH PT AND PT AGREED TO IRRIGATION AT THIS TIME

## 2021-10-25 PROBLEM — M79.672 PAIN IN LEFT FOOT: Status: ACTIVE | Noted: 2021-10-25

## 2021-12-09 ENCOUNTER — TELEPHONE (OUTPATIENT)
Dept: HEALTH INFORMATION MANAGEMENT | Facility: OTHER | Age: 66
End: 2021-12-09

## 2021-12-10 ENCOUNTER — TELEPHONE (OUTPATIENT)
Dept: MEDICAL GROUP | Facility: MEDICAL CENTER | Age: 66
End: 2021-12-10

## 2021-12-10 NOTE — TELEPHONE ENCOUNTER
Phone Number Called: [phone] (home)     Call outcome: Left detailed message for patient. Informed to call back with any additional questions.    Message: pt. Left a voicemail asking if he had any labs he needed to do prior to his next appointment. Left patient a voicemail stating yes  ordered fasting labs for him to complete prior to his next appointment.

## 2021-12-10 NOTE — TELEPHONE ENCOUNTER
Phone Number Called: [phone] (home)     Call outcome: Spoke to patient regarding message below.    Message: pt. Informed if he goes to [facility] lab he doesn't need a lab slip. Pt. Given address to monty huerta and reminded of fasting. Pt. Verbalized understanding.

## 2021-12-27 ENCOUNTER — HOSPITAL ENCOUNTER (OUTPATIENT)
Facility: MEDICAL CENTER | Age: 66
End: 2021-12-27
Attending: ANESTHESIOLOGY
Payer: MEDICARE

## 2021-12-27 LAB — COVID ORDER STATUS COVID19: NORMAL

## 2021-12-27 PROCEDURE — U0003 INFECTIOUS AGENT DETECTION BY NUCLEIC ACID (DNA OR RNA); SEVERE ACUTE RESPIRATORY SYNDROME CORONAVIRUS 2 (SARS-COV-2) (CORONAVIRUS DISEASE [COVID-19]), AMPLIFIED PROBE TECHNIQUE, MAKING USE OF HIGH THROUGHPUT TECHNOLOGIES AS DESCRIBED BY CMS-2020-01-R: HCPCS

## 2021-12-27 PROCEDURE — U0005 INFEC AGEN DETEC AMPLI PROBE: HCPCS

## 2021-12-28 LAB
SARS-COV-2 RNA RESP QL NAA+PROBE: NOTDETECTED
SPECIMEN SOURCE: NORMAL

## 2022-01-25 ENCOUNTER — HOSPITAL ENCOUNTER (OUTPATIENT)
Dept: LAB | Facility: MEDICAL CENTER | Age: 67
End: 2022-01-25
Attending: FAMILY MEDICINE
Payer: MEDICARE

## 2022-01-25 DIAGNOSIS — R73.9 HYPERGLYCEMIA: ICD-10-CM

## 2022-01-25 DIAGNOSIS — E78.5 DYSLIPIDEMIA: ICD-10-CM

## 2022-01-25 DIAGNOSIS — Z12.5 PROSTATE CANCER SCREENING: ICD-10-CM

## 2022-01-25 DIAGNOSIS — K27.9 PEPTIC ULCER DISEASE: ICD-10-CM

## 2022-01-25 LAB
ALBUMIN SERPL BCP-MCNC: 4.7 G/DL (ref 3.2–4.9)
ALBUMIN/GLOB SERPL: 1.3 G/DL
ALP SERPL-CCNC: 84 U/L (ref 30–99)
ALT SERPL-CCNC: 46 U/L (ref 2–50)
ANION GAP SERPL CALC-SCNC: 11 MMOL/L (ref 7–16)
AST SERPL-CCNC: 32 U/L (ref 12–45)
BASOPHILS # BLD AUTO: 0.6 % (ref 0–1.8)
BASOPHILS # BLD: 0.04 K/UL (ref 0–0.12)
BILIRUB SERPL-MCNC: 0.4 MG/DL (ref 0.1–1.5)
BUN SERPL-MCNC: 11 MG/DL (ref 8–22)
CALCIUM SERPL-MCNC: 9.7 MG/DL (ref 8.5–10.5)
CHLORIDE SERPL-SCNC: 101 MMOL/L (ref 96–112)
CHOLEST SERPL-MCNC: 247 MG/DL (ref 100–199)
CO2 SERPL-SCNC: 26 MMOL/L (ref 20–33)
CREAT SERPL-MCNC: 0.8 MG/DL (ref 0.5–1.4)
EOSINOPHIL # BLD AUTO: 0.08 K/UL (ref 0–0.51)
EOSINOPHIL NFR BLD: 1.2 % (ref 0–6.9)
ERYTHROCYTE [DISTWIDTH] IN BLOOD BY AUTOMATED COUNT: 39.8 FL (ref 35.9–50)
EST. AVERAGE GLUCOSE BLD GHB EST-MCNC: 114 MG/DL
GLOBULIN SER CALC-MCNC: 3.6 G/DL (ref 1.9–3.5)
GLUCOSE SERPL-MCNC: 80 MG/DL (ref 65–99)
HBA1C MFR BLD: 5.6 % (ref 4–5.6)
HCT VFR BLD AUTO: 51.1 % (ref 42–52)
HDLC SERPL-MCNC: 43 MG/DL
HGB BLD-MCNC: 17.6 G/DL (ref 14–18)
IMM GRANULOCYTES # BLD AUTO: 0.04 K/UL (ref 0–0.11)
IMM GRANULOCYTES NFR BLD AUTO: 0.6 % (ref 0–0.9)
LDLC SERPL CALC-MCNC: 170 MG/DL
LYMPHOCYTES # BLD AUTO: 1.62 K/UL (ref 1–4.8)
LYMPHOCYTES NFR BLD: 24.5 % (ref 22–41)
MCH RBC QN AUTO: 30.2 PG (ref 27–33)
MCHC RBC AUTO-ENTMCNC: 34.4 G/DL (ref 33.7–35.3)
MCV RBC AUTO: 87.7 FL (ref 81.4–97.8)
MONOCYTES # BLD AUTO: 0.57 K/UL (ref 0–0.85)
MONOCYTES NFR BLD AUTO: 8.6 % (ref 0–13.4)
NEUTROPHILS # BLD AUTO: 4.26 K/UL (ref 1.82–7.42)
NEUTROPHILS NFR BLD: 64.5 % (ref 44–72)
NRBC # BLD AUTO: 0 K/UL
NRBC BLD-RTO: 0 /100 WBC
PLATELET # BLD AUTO: 281 K/UL (ref 164–446)
PMV BLD AUTO: 9 FL (ref 9–12.9)
POTASSIUM SERPL-SCNC: 5.1 MMOL/L (ref 3.6–5.5)
PROT SERPL-MCNC: 8.3 G/DL (ref 6–8.2)
PSA SERPL-MCNC: 3.77 NG/ML (ref 0–4)
RBC # BLD AUTO: 5.83 M/UL (ref 4.7–6.1)
SODIUM SERPL-SCNC: 138 MMOL/L (ref 135–145)
TRIGL SERPL-MCNC: 170 MG/DL (ref 0–149)
WBC # BLD AUTO: 6.6 K/UL (ref 4.8–10.8)

## 2022-01-25 PROCEDURE — 85025 COMPLETE CBC W/AUTO DIFF WBC: CPT

## 2022-01-25 PROCEDURE — 80053 COMPREHEN METABOLIC PANEL: CPT

## 2022-01-25 PROCEDURE — 84153 ASSAY OF PSA TOTAL: CPT

## 2022-01-25 PROCEDURE — 83036 HEMOGLOBIN GLYCOSYLATED A1C: CPT

## 2022-01-25 PROCEDURE — 36415 COLL VENOUS BLD VENIPUNCTURE: CPT

## 2022-01-25 PROCEDURE — 80061 LIPID PANEL: CPT

## 2022-01-26 ENCOUNTER — TELEPHONE (OUTPATIENT)
Dept: MEDICAL GROUP | Facility: MEDICAL CENTER | Age: 67
End: 2022-01-26

## 2022-01-26 NOTE — TELEPHONE ENCOUNTER
Phone Number Called: [phone] (home)     Call outcome: Spoke to patient regarding message below.     Message: Informed pt about results. Pt stated he will continue with Mediterranean type diet and will think about taking cholesterol, will discuss more about it at upcoming appointment in April.

## 2022-07-06 ENCOUNTER — TELEPHONE (OUTPATIENT)
Dept: HEALTH INFORMATION MANAGEMENT | Facility: OTHER | Age: 67
End: 2022-07-06
Payer: MEDICARE

## 2022-08-05 PROBLEM — R97.20 ELEVATED PROSTATE SPECIFIC ANTIGEN (PSA): Status: ACTIVE | Noted: 2022-08-05

## 2022-08-05 PROBLEM — E66.9 OBESITY (BMI 30.0-34.9): Status: ACTIVE | Noted: 2022-08-05

## 2022-08-05 PROBLEM — N52.9 ED (ERECTILE DYSFUNCTION): Status: ACTIVE | Noted: 2022-08-05

## 2022-08-05 PROBLEM — R03.0 ELEVATED BLOOD PRESSURE READING IN OFFICE WITHOUT DIAGNOSIS OF HYPERTENSION: Status: ACTIVE | Noted: 2022-08-05

## 2022-08-05 PROBLEM — E66.811 OBESITY (BMI 30.0-34.9): Status: ACTIVE | Noted: 2022-08-05

## 2022-09-23 ENCOUNTER — OFFICE VISIT (OUTPATIENT)
Dept: MEDICAL GROUP | Facility: MEDICAL CENTER | Age: 67
End: 2022-09-23
Payer: MEDICARE

## 2022-09-23 VITALS
SYSTOLIC BLOOD PRESSURE: 138 MMHG | BODY MASS INDEX: 31.07 KG/M2 | WEIGHT: 205 LBS | HEART RATE: 84 BPM | RESPIRATION RATE: 16 BRPM | HEIGHT: 68 IN | DIASTOLIC BLOOD PRESSURE: 80 MMHG | OXYGEN SATURATION: 95 % | TEMPERATURE: 98.8 F

## 2022-09-23 DIAGNOSIS — R03.0 ELEVATED BLOOD PRESSURE READING IN OFFICE WITHOUT DIAGNOSIS OF HYPERTENSION: ICD-10-CM

## 2022-09-23 DIAGNOSIS — E78.5 DYSLIPIDEMIA: ICD-10-CM

## 2022-09-23 DIAGNOSIS — Z12.5 PROSTATE CANCER SCREENING: ICD-10-CM

## 2022-09-23 DIAGNOSIS — R73.9 HYPERGLYCEMIA: ICD-10-CM

## 2022-09-23 PROCEDURE — 99214 OFFICE O/P EST MOD 30 MIN: CPT | Performed by: FAMILY MEDICINE

## 2022-09-23 RX ORDER — ATORVASTATIN CALCIUM 10 MG/1
10 TABLET, FILM COATED ORAL NIGHTLY
Qty: 90 TABLET | Refills: 3 | Status: SHIPPED | OUTPATIENT
Start: 2022-09-23 | End: 2023-03-06 | Stop reason: SDUPTHER

## 2022-09-23 ASSESSMENT — FIBROSIS 4 INDEX: FIB4 SCORE: 1.11

## 2022-09-23 NOTE — PROGRESS NOTES
"Subjective:     CC: follow up chronic conditions    HPI:   Joe presents today with:    Dyslipidemia  History of elevated LDL per below. Joe is amenable to statin therapy. He is trying to maintain a healthy diet but struggles while he is traveling.    Lab Results   Component Value Date/Time    CHOLSTRLTOT 247 (H) 01/25/2022 10:40 AM     (H) 01/25/2022 10:40 AM    HDL 43 01/25/2022 10:40 AM    TRIGLYCERIDE 170 (H) 01/25/2022 10:40 AM           Hyperglycemia  History of mild hyperglycemia. Joe eats well when he is home however when he travels. He walks at least 5 miles daily and does yoga regularly.    Elevated blood pressure reading in office without diagnosis of hypertension  Patient noted to have elevated BP at [facility] visit. I have recommended pt get home BP for further monitoring.      Past Medical History:   Diagnosis Date    BMI 27.0-27.9,adult 7/28/2020       Social History     Tobacco Use    Smoking status: Never    Smokeless tobacco: Never   Vaping Use    Vaping Use: Never used   Substance Use Topics    Alcohol use: Yes     Comment: once a month     Drug use: Not Currently     Types: Marijuana     Comment: Marijuana       Current Outpatient Medications Ordered in Epic   Medication Sig Dispense Refill    atorvastatin (LIPITOR) 10 MG Tab Take 1 Tablet by mouth every evening. 90 Tablet 3    Sildenafil Citrate (VIAGRA PO) Take  by mouth.       No current Epic-ordered facility-administered medications on file.       Allergies:  Patient has no known allergies.    Health Maintenance: Discussed vaccinations and colorectal cancer screening, patient declines at this time    ROS:  Gen: no fevers/chills, no changes in weight  Eyes: no changes in vision  ENT: no sore throat, no hearing loss, no bloody nose  Pulm: no SOB  CV: no chest pain        Objective:       Exam:  /80 (BP Location: Left arm, Patient Position: Sitting)   Pulse 84   Temp 37.1 °C (98.8 °F) (Temporal)   Resp 16   Ht 1.727 m (5' 8\")  "  Wt 93 kg (205 lb)   SpO2 95%   BMI 31.17 kg/m²  Body mass index is 31.17 kg/m².    Gen: Alert and oriented, No apparent distress  Lungs: Normal effort, CTA bilaterally, no wheezes, rhonchi, or rales  CV: Regular rate and rhythm, no murmurs, rubs, or gallops      Assessment & Plan:     66 y.o. male with the following -     1. Dyslipidemia  Discussed importance of Mediterranean type diet. Patient amenable to low dose statin  - atorvastatin (LIPITOR) 10 MG Tab; Take 1 Tablet by mouth every evening.  Dispense: 90 Tablet; Refill: 3  - Lipid Profile; Future  - Comp Metabolic Panel; Future  - HEMOGLOBIN A1C; Future    2. Hyperglycemia  - HEMOGLOBIN A1C; Future    3. Elevated blood pressure reading in office without diagnosis of hypertension  Recommended home blood pressure monitoring, goal less than 140/90. Discussed lifestyle modification including weight loss to achieve goal BP  - Lipid Profile; Future  - Comp Metabolic Panel; Future  - HEMOGLOBIN A1C; Future  - MICROALBUMIN CREAT RATIO URINE; Future  - CBC WITH DIFFERENTIAL; Future    4. Prostate cancer screening  - PROSTATE SPECIFIC AG SCREENING; Future      Patient notified I will be leaving [facility] November 8, 2022. I encouraged the patient to establish with new [facility] PCP. Patient instructed to call [phone] to schedule if he desires.      Please note this dictation was created using voice recognition software. I have made every reasonable attempt to correct obvious errors, but I expect there may be errors of grammar, and possibly content, that I did not discover before finalizing the note.

## 2022-09-23 NOTE — ASSESSMENT & PLAN NOTE
History of mild hyperglycemia. Joe eats well when he is home however when he travels. He walks at least 5 miles daily and does yoga regularly.

## 2022-09-23 NOTE — ASSESSMENT & PLAN NOTE
History of elevated LDL per below. Joe is amenable to statin therapy. He is trying to maintain a healthy diet but struggles while he is traveling.    Lab Results   Component Value Date/Time    CHOLSTRLTOT 247 (H) 01/25/2022 10:40 AM     (H) 01/25/2022 10:40 AM    HDL 43 01/25/2022 10:40 AM    TRIGLYCERIDE 170 (H) 01/25/2022 10:40 AM

## 2022-09-24 NOTE — ASSESSMENT & PLAN NOTE
Patient noted to have elevated BP at [facility] visit. I have recommended pt get home BP for further monitoring.

## 2023-02-17 ENCOUNTER — HOSPITAL ENCOUNTER (OUTPATIENT)
Dept: LAB | Facility: MEDICAL CENTER | Age: 68
End: 2023-02-17
Attending: FAMILY MEDICINE
Payer: MEDICARE

## 2023-02-17 DIAGNOSIS — R03.0 ELEVATED BLOOD PRESSURE READING IN OFFICE WITHOUT DIAGNOSIS OF HYPERTENSION: ICD-10-CM

## 2023-02-17 DIAGNOSIS — R73.9 HYPERGLYCEMIA: ICD-10-CM

## 2023-02-17 DIAGNOSIS — Z12.5 PROSTATE CANCER SCREENING: ICD-10-CM

## 2023-02-17 DIAGNOSIS — E78.5 DYSLIPIDEMIA: ICD-10-CM

## 2023-02-17 LAB
ALBUMIN SERPL BCP-MCNC: 4.4 G/DL (ref 3.2–4.9)
ALBUMIN/GLOB SERPL: 1.4 G/DL
ALP SERPL-CCNC: 78 U/L (ref 30–99)
ALT SERPL-CCNC: 33 U/L (ref 2–50)
ANION GAP SERPL CALC-SCNC: 11 MMOL/L (ref 7–16)
AST SERPL-CCNC: 28 U/L (ref 12–45)
BASOPHILS # BLD AUTO: 0.6 % (ref 0–1.8)
BASOPHILS # BLD: 0.04 K/UL (ref 0–0.12)
BILIRUB SERPL-MCNC: 0.5 MG/DL (ref 0.1–1.5)
BUN SERPL-MCNC: 13 MG/DL (ref 8–22)
CALCIUM ALBUM COR SERPL-MCNC: 8.9 MG/DL (ref 8.5–10.5)
CALCIUM SERPL-MCNC: 9.2 MG/DL (ref 8.5–10.5)
CHLORIDE SERPL-SCNC: 102 MMOL/L (ref 96–112)
CHOLEST SERPL-MCNC: 194 MG/DL (ref 100–199)
CO2 SERPL-SCNC: 25 MMOL/L (ref 20–33)
CREAT SERPL-MCNC: 0.79 MG/DL (ref 0.5–1.4)
CREAT UR-MCNC: 93.88 MG/DL
EOSINOPHIL # BLD AUTO: 0.1 K/UL (ref 0–0.51)
EOSINOPHIL NFR BLD: 1.5 % (ref 0–6.9)
ERYTHROCYTE [DISTWIDTH] IN BLOOD BY AUTOMATED COUNT: 41.3 FL (ref 35.9–50)
EST. AVERAGE GLUCOSE BLD GHB EST-MCNC: 120 MG/DL
FASTING STATUS PATIENT QL REPORTED: NORMAL
GFR SERPLBLD CREATININE-BSD FMLA CKD-EPI: 97 ML/MIN/1.73 M 2
GLOBULIN SER CALC-MCNC: 3.1 G/DL (ref 1.9–3.5)
GLUCOSE SERPL-MCNC: 112 MG/DL (ref 65–99)
HBA1C MFR BLD: 5.8 % (ref 4–5.6)
HCT VFR BLD AUTO: 50.8 % (ref 42–52)
HDLC SERPL-MCNC: 45 MG/DL
HGB BLD-MCNC: 17.2 G/DL (ref 14–18)
IMM GRANULOCYTES # BLD AUTO: 0.06 K/UL (ref 0–0.11)
IMM GRANULOCYTES NFR BLD AUTO: 0.9 % (ref 0–0.9)
LDLC SERPL CALC-MCNC: 113 MG/DL
LYMPHOCYTES # BLD AUTO: 1.52 K/UL (ref 1–4.8)
LYMPHOCYTES NFR BLD: 22.7 % (ref 22–41)
MCH RBC QN AUTO: 30.2 PG (ref 27–33)
MCHC RBC AUTO-ENTMCNC: 33.9 G/DL (ref 33.7–35.3)
MCV RBC AUTO: 89.1 FL (ref 81.4–97.8)
MICROALBUMIN UR-MCNC: <1.2 MG/DL
MICROALBUMIN/CREAT UR: NORMAL MG/G (ref 0–30)
MONOCYTES # BLD AUTO: 0.48 K/UL (ref 0–0.85)
MONOCYTES NFR BLD AUTO: 7.2 % (ref 0–13.4)
NEUTROPHILS # BLD AUTO: 4.49 K/UL (ref 1.82–7.42)
NEUTROPHILS NFR BLD: 67.1 % (ref 44–72)
NRBC # BLD AUTO: 0 K/UL
NRBC BLD-RTO: 0 /100 WBC
PLATELET # BLD AUTO: 277 K/UL (ref 164–446)
PMV BLD AUTO: 8.8 FL (ref 9–12.9)
POTASSIUM SERPL-SCNC: 4.5 MMOL/L (ref 3.6–5.5)
PROT SERPL-MCNC: 7.5 G/DL (ref 6–8.2)
PSA SERPL-MCNC: 3.4 NG/ML (ref 0–4)
RBC # BLD AUTO: 5.7 M/UL (ref 4.7–6.1)
SODIUM SERPL-SCNC: 138 MMOL/L (ref 135–145)
TRIGL SERPL-MCNC: 178 MG/DL (ref 0–149)
WBC # BLD AUTO: 6.7 K/UL (ref 4.8–10.8)

## 2023-02-17 PROCEDURE — 82570 ASSAY OF URINE CREATININE: CPT

## 2023-02-17 PROCEDURE — 85025 COMPLETE CBC W/AUTO DIFF WBC: CPT

## 2023-02-17 PROCEDURE — 80053 COMPREHEN METABOLIC PANEL: CPT

## 2023-02-17 PROCEDURE — 80061 LIPID PANEL: CPT

## 2023-02-17 PROCEDURE — 83036 HEMOGLOBIN GLYCOSYLATED A1C: CPT

## 2023-02-17 PROCEDURE — 82043 UR ALBUMIN QUANTITATIVE: CPT

## 2023-02-17 PROCEDURE — 36415 COLL VENOUS BLD VENIPUNCTURE: CPT

## 2023-02-17 PROCEDURE — 84153 ASSAY OF PSA TOTAL: CPT

## 2023-03-05 NOTE — PROGRESS NOTES
Subjective:     CC:  Diagnoses of Dyslipidemia and Hyperglycemia were pertinent to this visit.    HISTORY OF THE PRESENT ILLNESS: Patient is a 67 y.o. male. This pleasant patient is here today to establish care and discuss labs. His prior PCP was Dr. Mirna Anders.  He is a bank owner, enjoys frequent traveling and outdoor adventures.    Hyperglycemia-  He skis regularly.  He enjoys sweets and struggles to cut back on them.    HLD- improved on labs 2/17/23 compared to 1 year ago.  LDL improved from 170 to 113 (33% reduction in LDL). The 10-year ASCVD risk score (Michoacano HARO, et al., 2019) is: 14.1%.  He rarely misses doses (3-4 days since getting the Rx).  States his stomach feels edgy at times but no GERD.  No nausea or vomiting.  No diarrhea or constipation.  No abdominal pain.  Has hx of gastric ulcer that healed on repeat EGD.  He has taken 3 tums/rolaids in the last 6 years.  No muscle cramps.    Elevated BP- blood pressure on 9/23/22 was 138/80.    Allergies: Patient has no known allergies.    Current Outpatient Medications Ordered in Epic   Medication Sig Dispense Refill    atorvastatin (LIPITOR) 20 MG Tab Take 1 Tablet by mouth every evening. To replace atorvastatin 10mg daily 90 Tablet 3    Sildenafil Citrate (VIAGRA PO) Take  by mouth.       No current Epic-ordered facility-administered medications on file.       Past Medical History:   Diagnosis Date    BMI 27.0-27.9,adult 7/28/2020       Past Surgical History:   Procedure Laterality Date    PB RECONSTRUC TOE DEFORM,SOFT TISSUE Left 12/30/2021    Procedure: LEFT FOOT HALLUX VALGUS CORRECTION, LEFT AKIN OSTEOTOMY,;  Surgeon: Ollie Kovacs M.D.;  Location: [facility];  Service: Orthopedics    PB CORRECT BUNION,PHALANX OSTEOTOMY Left 12/30/2021    Procedure: LEFT DISTAL SOFT TISSUE PROCEDURE;  Surgeon: Ollie Kovacs M.D.;  Location: [facility];  Service: Orthopedics    PB OSTEOTOMY METATARSALS,MULTIPLE Left  12/30/2021    Procedure: LEFT SECOND, THIRD DISTAL METATARSAL OSTEOTOMY;  Surgeon: Ollie Kovacs M.D.;  Location: [facility];  Service: Orthopedics    PB PART EXCIS 5TH METATARSAL HEAD Left 12/30/2021    Procedure: LEFT SECOND, THIRD METATARSOPHALANGEAL JOINT RECONSTRUCTION;  Surgeon: Ollie Kovacs M.D.;  Location: [facility];  Service: Orthopedics    PB REPAIR OF HAMMERTOE,ONE Left 12/30/2021    Procedure: LEFT SECOND AND THIRD HAMMERTOE CORRECTION;  Surgeon: Ollie Kovacs M.D.;  Location: [facility];  Service: Orthopedics    PB EXCIS INTERDIGITAL NEUROMA,EA Left 12/30/2021    Procedure: LEFT SECOND WEBSPACE NEUROMA EXCISION;  Surgeon: Ollie Kovacs M.D.;  Location: [facility];  Service: Orthopedics       Social History     Tobacco Use    Smoking status: Never    Smokeless tobacco: Never   Vaping Use    Vaping Use: Never used   Substance Use Topics    Alcohol use: Yes     Comment: once a month     Drug use: Not Currently     Types: Marijuana     Comment: Marijuana       Social History     Social History Narrative    Not on file       Family History   Problem Relation Age of Onset    Heart Disease Mother         CHF    Heart Disease Father         bypass age 49, 75       Health Maintenance: Tdap declined.  Pt declines further COVID19 vaccines.  Declines shingrix and pneumonia and flu shots.    ROS:   Gen: no fevers/chills, no changes in weight  Eyes: no changes in vision  ENT: no sore throat, no hearing loss, no bloody nose  Pulm: no sob, no cough  CV: no chest pain, no palpitations  GI: no nausea/vomiting, no diarrhea  : no dysuria  MSk: no myalgias  Skin: no rash  Neuro: no headaches, no numbness/tingling  Heme/Lymph: no easy bruising      Objective:     Exam: /62 (BP Location: Left arm, Patient Position: Sitting, BP Cuff Size: Adult long)   Pulse 79   Temp 36.7 °C (98.1 °F) (Temporal)   Resp 20   Ht 1.727 m  "(5' 8\")   Wt 96.6 kg (212 lb 13.7 oz)   SpO2 95%  Body mass index is 32.36 kg/m².    General: Normal appearing. No distress.  HEENT: Normocephalic.  Canals are clear bilaterally, tympanic membranes are benign, nasal and OP examinations deferred given COVID19 exposure risk  Eyes: Eyes conjunctiva clear lids without ptosis, pupils equal and reactive to light accommodation, ears normal shape and contour  Neck: Supple. Thyroid is not enlarged.  Pulmonary: Clear to ausculation.  Normal effort. No rales, ronchi, or wheezing.  Cardiovascular: Regular rate and rhythm without murmur.   Abdomen: Soft, nontender, nondistended. Normal bowel sounds. Liver and spleen are not palpable  Neurologic: No gross motor deficits  Lymph: No cervical or supraclavicular lymph nodes are palpable  Skin: Warm and dry.  No obvious lesions.  Musculoskeletal: Normal gait. No extremity cyanosis, clubbing, or edema.  Psych: Normal mood and affect. Alert and oriented x3. Judgment and insight is normal.    Labs: 2/17/23 labs reviewed with patient    Assessment & Plan:   67 y.o. male with the following -    1. Dyslipidemia  Chronic, improved s/p starting lipitor 10mg, but not at goal of LDL less than 100 (given family hx and ASCVD 10 year risk score), asymptomatic.  Recommended increasing atorvastatin 10mg to 20mg daily.  SE profile discussed and follow-up labs ordered.  Dietary and exercise guidance given.  Follow-up precautions given.  - atorvastatin (LIPITOR) 20 MG Tab; Take 1 Tablet by mouth every evening. To replace atorvastatin 10mg daily  Dispense: 90 Tablet; Refill: 3  - HEPATIC FUNCTION PANEL; Future  - Lipid Profile; Future    2. Hyperglycemia  Chronic, asymptomatic.  Fasting sugars have been elevated in the past, but A1c was normal until most recent labs on 2/17/23.  Dietary and exercise guidance given.  Encouraged weight optimization.    3. Obesity (BMI 30-39.9)  - Patient identified as having weight management issue.  Appropriate orders " and counseling given.        Return in about 2 months (around 5/6/2023) for establish care with new PCP because Dr. Rojas is transferring.    Please note that this dictation was created using voice recognition software. I have made every reasonable attempt to correct obvious errors, but I expect that there are errors of grammar and possibly content that I did not discover before finalizing the note.

## 2023-03-06 ENCOUNTER — OFFICE VISIT (OUTPATIENT)
Dept: MEDICAL GROUP | Facility: MEDICAL CENTER | Age: 68
End: 2023-03-06
Payer: MEDICARE

## 2023-03-06 VITALS
OXYGEN SATURATION: 95 % | BODY MASS INDEX: 32.26 KG/M2 | HEIGHT: 68 IN | WEIGHT: 212.85 LBS | HEART RATE: 79 BPM | RESPIRATION RATE: 20 BRPM | SYSTOLIC BLOOD PRESSURE: 122 MMHG | DIASTOLIC BLOOD PRESSURE: 62 MMHG | TEMPERATURE: 98.1 F

## 2023-03-06 DIAGNOSIS — R73.9 HYPERGLYCEMIA: ICD-10-CM

## 2023-03-06 DIAGNOSIS — E78.5 DYSLIPIDEMIA: ICD-10-CM

## 2023-03-06 DIAGNOSIS — E66.9 OBESITY (BMI 30-39.9): ICD-10-CM

## 2023-03-06 PROCEDURE — 99214 OFFICE O/P EST MOD 30 MIN: CPT | Performed by: FAMILY MEDICINE

## 2023-03-06 RX ORDER — ATORVASTATIN CALCIUM 20 MG/1
20 TABLET, FILM COATED ORAL NIGHTLY
Qty: 90 TABLET | Refills: 3 | Status: SHIPPED | OUTPATIENT
Start: 2023-03-06 | End: 2023-03-21 | Stop reason: SDUPTHER

## 2023-03-06 ASSESSMENT — FIBROSIS 4 INDEX: FIB4 SCORE: 1.18

## 2023-03-06 ASSESSMENT — PATIENT HEALTH QUESTIONNAIRE - PHQ9: CLINICAL INTERPRETATION OF PHQ2 SCORE: 0

## 2023-03-07 PROBLEM — E66.9 OBESITY (BMI 30-39.9): Status: ACTIVE | Noted: 2023-03-07

## 2023-03-17 ENCOUNTER — TELEPHONE (OUTPATIENT)
Dept: MEDICAL GROUP | Facility: MEDICAL CENTER | Age: 68
End: 2023-03-17
Payer: MEDICARE

## 2023-03-17 DIAGNOSIS — E78.5 DYSLIPIDEMIA: ICD-10-CM

## 2023-03-17 NOTE — TELEPHONE ENCOUNTER
1. Caller Name: Joe Morelos                          Call Back Number: [phone] (home)         How would the patient prefer to be contacted with a response: Phone call OK to leave a detailed message    Patient called stating he does not want to increase the statin medication yet, he would like to try diet/exercise along with the old dosage  Dr Rojas would you be willing for pt to continue with the old dose?  Pt requests a call back

## 2023-03-21 RX ORDER — ATORVASTATIN CALCIUM 10 MG/1
10 TABLET, FILM COATED ORAL NIGHTLY
Qty: 90 TABLET | Refills: 2 | Status: SHIPPED | OUTPATIENT
Start: 2023-03-21 | End: 2023-10-03

## 2023-03-21 NOTE — TELEPHONE ENCOUNTER
Phone Number Called: [phone] (home)     Call outcome:  LM informing pt that his rx was sent to the pharmacy for atorvastatin. Pt to call if further questions.

## 2023-03-21 NOTE — TELEPHONE ENCOUNTER
Called pt who declines increasing atorvastatin 10mg to 20mg daily and want to continue with 10mg dose instead with dietary and lifestyle modifications before recheck in about 2-3 months.  He is aware of the risk of uncontrolled HLD but wants to avoid possible liver irritation with higher dose of statin.  I resubmitted his prescription and follow-up precautions were given. Patient expressed understanding and agreement with plan.  -Dr. iMrna Rojas

## 2023-05-10 ENCOUNTER — TELEPHONE (OUTPATIENT)
Dept: HEALTH INFORMATION MANAGEMENT | Facility: OTHER | Age: 68
End: 2023-05-10

## 2023-08-27 ENCOUNTER — TELEPHONE (OUTPATIENT)
Dept: SCHEDULING | Facility: IMAGING CENTER | Age: 68
End: 2023-08-27

## 2023-10-03 ENCOUNTER — OFFICE VISIT (OUTPATIENT)
Dept: MEDICAL GROUP | Facility: MEDICAL CENTER | Age: 68
End: 2023-10-03
Payer: MEDICARE

## 2023-10-03 VITALS
HEIGHT: 68 IN | TEMPERATURE: 97.9 F | DIASTOLIC BLOOD PRESSURE: 70 MMHG | WEIGHT: 212.6 LBS | HEART RATE: 80 BPM | BODY MASS INDEX: 32.22 KG/M2 | OXYGEN SATURATION: 97 % | SYSTOLIC BLOOD PRESSURE: 120 MMHG

## 2023-10-03 DIAGNOSIS — K27.9 PEPTIC ULCER DISEASE: ICD-10-CM

## 2023-10-03 DIAGNOSIS — I20.89 STABLE ANGINA (HCC): ICD-10-CM

## 2023-10-03 DIAGNOSIS — Z12.11 COLON CANCER SCREENING: ICD-10-CM

## 2023-10-03 DIAGNOSIS — E78.5 DYSLIPIDEMIA: ICD-10-CM

## 2023-10-03 PROCEDURE — 3074F SYST BP LT 130 MM HG: CPT | Performed by: STUDENT IN AN ORGANIZED HEALTH CARE EDUCATION/TRAINING PROGRAM

## 2023-10-03 PROCEDURE — 3078F DIAST BP <80 MM HG: CPT | Performed by: STUDENT IN AN ORGANIZED HEALTH CARE EDUCATION/TRAINING PROGRAM

## 2023-10-03 PROCEDURE — 99215 OFFICE O/P EST HI 40 MIN: CPT | Performed by: STUDENT IN AN ORGANIZED HEALTH CARE EDUCATION/TRAINING PROGRAM

## 2023-10-03 RX ORDER — ATORVASTATIN CALCIUM 20 MG/1
20 TABLET, FILM COATED ORAL NIGHTLY
Qty: 100 TABLET | Refills: 1 | Status: ON HOLD | OUTPATIENT
Start: 2023-10-03 | End: 2024-01-23

## 2023-10-03 RX ORDER — ASPIRIN 81 MG/1
81 TABLET ORAL DAILY
Qty: 100 TABLET | Refills: 2 | Status: SHIPPED | OUTPATIENT
Start: 2023-10-03

## 2023-10-03 RX ORDER — SILDENAFIL 25 MG/1
25 TABLET, FILM COATED ORAL
COMMUNITY
End: 2023-10-30

## 2023-10-03 RX ORDER — OMEPRAZOLE 20 MG/1
20 CAPSULE, DELAYED RELEASE ORAL DAILY
Qty: 100 CAPSULE | Refills: 1 | Status: ON HOLD | OUTPATIENT
Start: 2023-10-03 | End: 2024-01-19

## 2023-10-03 ASSESSMENT — ENCOUNTER SYMPTOMS
HEADACHES: 0
ORTHOPNEA: 0
DEPRESSION: 0
WEAKNESS: 0
COUGH: 0
LOSS OF CONSCIOUSNESS: 0
SPEECH CHANGE: 0
SENSORY CHANGE: 0
HEMOPTYSIS: 0
FEVER: 0
TINGLING: 0
FALLS: 0
FOCAL WEAKNESS: 0
SPUTUM PRODUCTION: 0
PALPITATIONS: 0
NAUSEA: 0
CLAUDICATION: 0
WHEEZING: 0
TREMORS: 0
BLURRED VISION: 0
BLOOD IN STOOL: 0
SORE THROAT: 0
HEARTBURN: 0
MYALGIAS: 0
SEIZURES: 0
PND: 0
DIZZINESS: 0

## 2023-10-03 ASSESSMENT — FIBROSIS 4 INDEX: FIB4 SCORE: 1.18

## 2023-10-03 NOTE — PROGRESS NOTES
Subjective:     CC: Establishing care, shortness of breath after long walks    HPI:   Joe presents today to establish care and an acute concern.    Chance reports that he is a vivid walker and has walked 2 miles [place] and around the country.  He walks 10 to 20 miles at a time and never had any concern but recently since July of this year he has noticed that after walking a few miles he starts having some chest pressure symptoms and shortness of breath which goes away after some rest.  Sometimes he had just continued walking despite of the symptoms and he states that his symptoms resolved spontaneously and he was able to finish his goal.  He recently finished a walk in [facility].  Denies any palpitations, dizziness, diaphoresis.  No symptoms at rest or with minimal exertion.  Patient has strong family history of heart diseases with father having all open bypass surgery at age 49 and mother having a heart attack in 70s.  Have a history of hyperlipidemia and has been on Lipitor 10 mg daily.  Dyspnea with exertion - since July this year.       Health Maintenance: Completed    ROS:  Review of Systems   Constitutional:  Negative for fever and malaise/fatigue.   HENT:  Negative for congestion and sore throat.    Eyes:  Negative for blurred vision.   Respiratory:  Negative for cough, hemoptysis, sputum production and wheezing.    Cardiovascular:  Negative for chest pain, palpitations, orthopnea, claudication, leg swelling and PND.   Gastrointestinal:  Negative for blood in stool, heartburn and nausea.   Genitourinary:  Negative for dysuria and urgency.   Musculoskeletal:  Negative for falls and myalgias.   Neurological:  Negative for dizziness, tingling, tremors, sensory change, speech change, focal weakness, seizures, loss of consciousness, weakness and headaches.   Psychiatric/Behavioral:  Negative for depression and suicidal ideas.        Review of systems unremarkable except  "for concerns noted by patient or items listed.    Please see HPI for additional ROS.      Objective:     Exam:  /70 (BP Location: Right arm, Patient Position: Sitting, BP Cuff Size: Adult)   Pulse 80   Temp 36.6 °C (97.9 °F) (Temporal)   Ht 1.727 m (5' 8\")   Wt 96.4 kg (212 lb 9.6 oz)   SpO2 97%   BMI 32.33 kg/m²  Body mass index is 32.33 kg/m².    Physical Exam  Constitutional:       General: He is not in acute distress.     Appearance: Normal appearance.   HENT:      Head: Normocephalic.   Cardiovascular:      Rate and Rhythm: Normal rate and regular rhythm.      Pulses: Normal pulses.      Heart sounds: Normal heart sounds.   Pulmonary:      Effort: Pulmonary effort is normal.      Breath sounds: Normal breath sounds.   Skin:     General: Skin is warm.   Neurological:      Mental Status: He is alert and oriented to person, place, and time.   Psychiatric:         Mood and Affect: Mood normal.         Behavior: Behavior normal.             Labs: Reviewed    Assessment & Plan:     67 y.o. male with the following -     1. Stable angina  New problem, uncontrolled  Patient presenting with exertional dyspnea and chest pressure.  Pretest probability - intermediate risk   Counseling and education provided. Recommended avoid over exertion. Discussed starting low dose metoprolol and as needed NG prescription or  wait for cardiology consult.after discussing with patient , its reasonable to await cardiology recommendations.    DiscussedDiscussed risk benefit and started Asprin 81 mg daily and increased Lipitor dose to 20 mg daily. Agrees w plan  We will order nuclear stress test/MPI stress test.  We will also place referral to cardiology.    Education provided to patient that I can prescribe nitroglycerin tablets as needed for chest pressure or pain and that it should be avoided for at least 24 hours if he has taken the Viagra medication.  Patient declined nitroglycerin prescription at present .      - aspirin 81 " MG EC tablet; Take 1 Tablet by mouth every day.  Dispense: 100 Tablet; Refill: 2  - NM-CARDIAC STRESS TEST; Future  - REFERRAL TO CARDIOLOGY    2. Dyslipidemia  Chronic, stable  Based on patient's ASCVD score recommended high intensity statin therapy.  Patient little reluctant to start Lipitor at 40 mg.  We will change his current dose of Lipitor to 20 mg which is a medium intensity.  Patient to follow-up with cardiology    The 10-year ASCVD risk score (Michoacano DK, et al., 2019) is: 13.7%    - atorvastatin (LIPITOR) 20 MG Tab; Take 1 Tablet by mouth every evening.  Dispense: 100 Tablet; Refill: 1    3. Peptic ulcer disease  Chronic, improved  Patient reports no current symptoms of heartburn or epigastric pain has not been on any proton pump inhibitors or history blockers.  Given I am starting him on aspirin I have recommended patient to start taking Prilosec.  - omeprazole (PRILOSEC) 20 MG delayed-release capsule; Take 1 Capsule by mouth every day.  Dispense: 100 Capsule; Refill: 1    4. Colon cancer screening  - COLOGUARD (FIT DNA)    I spent a total of 45 minutes with record review, exam, communication with the patient, communication with other providers, and documentation of this encounter.      Return in about 4 weeks (around 10/31/2023), or if symptoms worsen or fail to improve, for medications check, CAD.    Please note that this dictation was created using voice recognition software. I have made every reasonable attempt to correct obvious errors, but I expect that there are errors of grammar and possibly content that I did not discover before finalizing the note.

## 2023-10-09 ENCOUNTER — HOSPITAL ENCOUNTER (OUTPATIENT)
Dept: RADIOLOGY | Facility: MEDICAL CENTER | Age: 68
End: 2023-10-09
Attending: STUDENT IN AN ORGANIZED HEALTH CARE EDUCATION/TRAINING PROGRAM
Payer: MEDICARE

## 2023-10-19 ENCOUNTER — TELEPHONE (OUTPATIENT)
Dept: MEDICAL GROUP | Facility: MEDICAL CENTER | Age: 68
End: 2023-10-19

## 2023-10-19 ENCOUNTER — HOSPITAL ENCOUNTER (OUTPATIENT)
Dept: RADIOLOGY | Facility: MEDICAL CENTER | Age: 68
End: 2023-10-19
Attending: STUDENT IN AN ORGANIZED HEALTH CARE EDUCATION/TRAINING PROGRAM
Payer: MEDICARE

## 2023-10-19 DIAGNOSIS — I20.89 STABLE ANGINA (HCC): ICD-10-CM

## 2023-10-19 PROCEDURE — 78452 HT MUSCLE IMAGE SPECT MULT: CPT

## 2023-10-19 NOTE — TELEPHONE ENCOUNTER
Patient came in, and wanted to let the provider know that he has done his stress test. And if the provider would need to talk to him in regards of his stress test result, please reach out to him on this mobile number at [phone] after 3pm..Thank you..

## 2023-10-19 NOTE — TELEPHONE ENCOUNTER
Phone Number Called: [phone] (home)     Call outcome: Left detailed message for patient. Informed to call back with any additional questions.    Message: And to schedule an appointment for next week to discuss results.

## 2023-10-25 NOTE — TELEPHONE ENCOUNTER
Future Appointments         Provider Department Center    10/26/2023 4:40 PM (Arrive by 4:25 PM) Carrie Orozco M.D. [facility]    11/20/2023 11:00 AM (Arrive by 10:45 AM) Carrie Oroczo M.D. [facility]    12/28/2023 3:20 PM Antonio Mccracken D.O. [facility] [place] [facility]  Arrive at: Cardiology [facility] - Arrival

## 2023-10-26 ENCOUNTER — OFFICE VISIT (OUTPATIENT)
Dept: MEDICAL GROUP | Facility: MEDICAL CENTER | Age: 68
End: 2023-10-26
Payer: MEDICARE

## 2023-10-26 VITALS
TEMPERATURE: 98.2 F | OXYGEN SATURATION: 95 % | RESPIRATION RATE: 16 BRPM | SYSTOLIC BLOOD PRESSURE: 128 MMHG | BODY MASS INDEX: 32.77 KG/M2 | HEIGHT: 68 IN | HEART RATE: 83 BPM | DIASTOLIC BLOOD PRESSURE: 76 MMHG | WEIGHT: 216.2 LBS

## 2023-10-26 DIAGNOSIS — I20.89 STABLE ANGINA (HCC): Primary | ICD-10-CM

## 2023-10-26 DIAGNOSIS — E78.5 DYSLIPIDEMIA: ICD-10-CM

## 2023-10-26 PROCEDURE — 3078F DIAST BP <80 MM HG: CPT | Performed by: STUDENT IN AN ORGANIZED HEALTH CARE EDUCATION/TRAINING PROGRAM

## 2023-10-26 PROCEDURE — 3074F SYST BP LT 130 MM HG: CPT | Performed by: STUDENT IN AN ORGANIZED HEALTH CARE EDUCATION/TRAINING PROGRAM

## 2023-10-26 PROCEDURE — 99214 OFFICE O/P EST MOD 30 MIN: CPT | Performed by: STUDENT IN AN ORGANIZED HEALTH CARE EDUCATION/TRAINING PROGRAM

## 2023-10-26 RX ORDER — CARVEDILOL 3.12 MG/1
3.12 TABLET ORAL 2 TIMES DAILY WITH MEALS
Qty: 60 TABLET | Refills: 1 | Status: SHIPPED | OUTPATIENT
Start: 2023-10-26 | End: 2023-11-20

## 2023-10-26 ASSESSMENT — FIBROSIS 4 INDEX: FIB4 SCORE: 1.2

## 2023-10-26 NOTE — PROGRESS NOTES
Subjective:     CC: Follow-up on stress test    HPI:   Joe presents today to follow-up on NM stress test.  Patient was recently still seen for stable angina with symptoms of exertional dyspnea and chest pressure which improves with rest.  Stress test shows questionable moderate size reversible perfusion defect in inferior wall.  Discussed the results with patient.  Patient was already started on aspirin and Lipitor 20 mg last visit.  Based on stress test results I have already placed a cardiology referral for further work-up.  Possibly he might need a cath.  Patient reports that he has a cardiology appointment scheduled a month later meanwhile I have discussed risk benefits of beta-blockers.  I think it is appropriate to start him on a beta-blocker meanwhile waiting for the cardiology appointment.  Patient agrees with the plan    I have also discussed sending prescription for nitroglycerin as needed for chest pain and to discontinue Viagra given the risk of hypotension.  Patient declined nitroglycerin prescription.  He states that he is doing fine and would try to avoid any excessive exertional activity and if chest pressure comes he would take rest.  He does not want to take nitroglycerin.  I strongly emphasized on discontinuing Viagra temporarily.    Health Maintenance: Completed    ROS:  Review of Systems   Constitutional:  Negative for fever and malaise/fatigue.   HENT:  Negative for congestion and sore throat.    Eyes:  Negative for blurred vision.   Respiratory:  Negative for cough, shortness of breath and wheezing.    Cardiovascular:  Negative for chest pain, palpitations and leg swelling.   Gastrointestinal:  Negative for blood in stool, heartburn and nausea.   Genitourinary:  Negative for dysuria and urgency.   Musculoskeletal:  Negative for falls and myalgias.   Neurological:  Negative for dizziness and headaches.   Psychiatric/Behavioral:  Negative for depression and suicidal ideas.        Review of  "systems unremarkable except for concerns noted by patient or items listed.    Please see HPI for additional ROS.      Objective:     Exam:  /76 (BP Location: Left arm, Patient Position: Sitting)   Pulse 83   Temp 36.8 °C (98.2 °F) (Temporal)   Resp 16   Ht 1.727 m (5' 8\")   Wt 98.1 kg (216 lb 3.2 oz)   SpO2 95%   BMI 32.87 kg/m²  Body mass index is 32.87 kg/m².    Physical Exam  Constitutional:       General: He is not in acute distress.     Appearance: Normal appearance.   HENT:      Head: Normocephalic.   Cardiovascular:      Rate and Rhythm: Normal rate and regular rhythm.      Pulses: Normal pulses.      Heart sounds: Normal heart sounds.   Pulmonary:      Effort: Pulmonary effort is normal.      Breath sounds: Normal breath sounds.   Skin:     General: Skin is warm.   Neurological:      Mental Status: He is alert and oriented to person, place, and time.   Psychiatric:         Mood and Affect: Mood normal.         Behavior: Behavior normal.             Labs: Reviewed     NUCLEAR IMAGING INTERPRETATION   Questionable moderate sized reversible perfusion defect of the inferior wall.      SDS would be 3.   No focal wall motion abnormality seen.   LVEF 57%.   ECG INTERPRETATION   ST depression 1mm horizontal lateral leads at peak stress.    Assessment & Plan:     68 y.o. male with the following -     1. Stable angina    New problem, stable  Nuclear medicine stress testing showed moderate size reversible perfusion defect in inferior wall.  Patient would likely need require angiogram for further evaluation.  Plan  Cardiology referral placed  Counseling and education provided.   Recommended avoid over exertion.   Discussed starting low dose metoprolol and as needed NG prescription .  Patient agrees with metoprolol but declined nitroglycerin prescription.  We will start on Coreg 3.125 mg twice daily  Continue aspirin 81 mg daily and Lipitor 20 mg daily, probably he need to go up on the statin therapy to high " intensity.  For now patient wants to continue low dose and want to discuss this further during his cardiology appointment.     DiscussedDiscussed risk benefit and started Asprin 81 mg daily and increased Lipitor dose to 20 mg daily. Agrees w plan  - carvedilol (COREG) 3.125 MG Tab; Take 1 Tablet by mouth 2 times a day with meals.  Dispense: 60 Tablet; Refill: 1    Strict ER precautions discussed with patient.  Any acute chest pain, shortness of breath, dizziness, patient is recommended to go to ER ASAP.    Follow-up in 3 months  Please note that this dictation was created using voice recognition software. I have made every reasonable attempt to correct obvious errors, but I expect that there are errors of grammar and possibly content that I did not discover before finalizing the note.

## 2023-10-30 ASSESSMENT — ENCOUNTER SYMPTOMS
BLURRED VISION: 0
HEADACHES: 0
FEVER: 0
PALPITATIONS: 0
BLOOD IN STOOL: 0
DEPRESSION: 0
MYALGIAS: 0
WHEEZING: 0
DIZZINESS: 0
FALLS: 0
COUGH: 0
HEARTBURN: 0
NAUSEA: 0
SORE THROAT: 0
SHORTNESS OF BREATH: 0

## 2023-11-01 ENCOUNTER — OFFICE VISIT (OUTPATIENT)
Dept: CARDIOLOGY | Facility: MEDICAL CENTER | Age: 68
End: 2023-11-01
Attending: STUDENT IN AN ORGANIZED HEALTH CARE EDUCATION/TRAINING PROGRAM
Payer: MEDICARE

## 2023-11-01 VITALS
WEIGHT: 216 LBS | RESPIRATION RATE: 16 BRPM | HEIGHT: 68 IN | OXYGEN SATURATION: 97 % | HEART RATE: 73 BPM | SYSTOLIC BLOOD PRESSURE: 122 MMHG | BODY MASS INDEX: 32.74 KG/M2 | DIASTOLIC BLOOD PRESSURE: 76 MMHG

## 2023-11-01 DIAGNOSIS — R07.9 CHEST PAIN, UNSPECIFIED TYPE: ICD-10-CM

## 2023-11-01 DIAGNOSIS — E78.5 DYSLIPIDEMIA: ICD-10-CM

## 2023-11-01 DIAGNOSIS — I20.9 ANGINA PECTORIS (HCC): ICD-10-CM

## 2023-11-01 DIAGNOSIS — R94.39 ABNORMAL CARDIOVASCULAR STRESS TEST: ICD-10-CM

## 2023-11-01 LAB
EKG IMPRESSION: NORMAL
LV EJECT FRACT MOD 2C 99903: 63.87
LV EJECT FRACT MOD 4C 99902: 59.2
LV EJECT FRACT MOD BP 99901: 61.09

## 2023-11-01 PROCEDURE — 93306 TTE W/DOPPLER COMPLETE: CPT

## 2023-11-01 PROCEDURE — 3074F SYST BP LT 130 MM HG: CPT | Performed by: STUDENT IN AN ORGANIZED HEALTH CARE EDUCATION/TRAINING PROGRAM

## 2023-11-01 PROCEDURE — 93005 ELECTROCARDIOGRAM TRACING: CPT | Performed by: STUDENT IN AN ORGANIZED HEALTH CARE EDUCATION/TRAINING PROGRAM

## 2023-11-01 PROCEDURE — 3078F DIAST BP <80 MM HG: CPT | Performed by: STUDENT IN AN ORGANIZED HEALTH CARE EDUCATION/TRAINING PROGRAM

## 2023-11-01 PROCEDURE — 93010 ELECTROCARDIOGRAM REPORT: CPT | Performed by: STUDENT IN AN ORGANIZED HEALTH CARE EDUCATION/TRAINING PROGRAM

## 2023-11-01 PROCEDURE — 99205 OFFICE O/P NEW HI 60 MIN: CPT | Performed by: STUDENT IN AN ORGANIZED HEALTH CARE EDUCATION/TRAINING PROGRAM

## 2023-11-01 PROCEDURE — 93306 TTE W/DOPPLER COMPLETE: CPT | Mod: 26 | Performed by: STUDENT IN AN ORGANIZED HEALTH CARE EDUCATION/TRAINING PROGRAM

## 2023-11-01 PROCEDURE — 99211 OFF/OP EST MAY X REQ PHY/QHP: CPT | Mod: 25 | Performed by: STUDENT IN AN ORGANIZED HEALTH CARE EDUCATION/TRAINING PROGRAM

## 2023-11-01 ASSESSMENT — ENCOUNTER SYMPTOMS
ABDOMINAL PAIN: 0
NAUSEA: 0
PALPITATIONS: 0
DIZZINESS: 0
DYSPNEA ON EXERTION: 0
IRREGULAR HEARTBEAT: 0
NIGHT SWEATS: 0
FEVER: 0
SYNCOPE: 0
COUGH: 0
DIARRHEA: 0
SHORTNESS OF BREATH: 0
NEAR-SYNCOPE: 0
WEAKNESS: 0
VOMITING: 0
WHEEZING: 0
PND: 0
ORTHOPNEA: 0
FOCAL WEAKNESS: 0

## 2023-11-01 ASSESSMENT — FIBROSIS 4 INDEX: FIB4 SCORE: 1.2

## 2023-11-01 NOTE — PROGRESS NOTES
"    Cardiology Initial Consultation Note    Date of note:    11/1/2023    Primary Care Provider: Carrie Orozco M.D.  Referring Provider: Carrie Orozco M.D.     Patient Name: Joe Morelos   YOB: 1955  MRN:              [id number]    Chief Complaint: Abnormal stress test    History of Present Illness: Mr. Joe Morelos is a 68 y.o. male whose current medical problems include dyslipidemia, hypertension, prediabetes, and obesity who is here for cardiac consultation for abnormal stress test.    The patient was evaluated by his PCP for chest pain, and stress test shows \"questionable moderate size reversible perfusion defect in the inferior wall.\"  As such, the patient was referred to cardiology clinic for further work-up.  I personally reviewed the nuclear stress test from 10/19/2023, which showed reversible defect in the inferior wall.    The patient presents here establish care and to go over test results.  The patient reports feeling well currently.  However, he does report that he gets chest pressure on exertion.  These symptoms started around June 2023.  He denies any orthopnea, PND, or leg swelling.  No palpitations.  No syncope or presyncopal signs.      Cardiovascular Risk Factors:  1. Smoking status: Never smoker  2. Type II Diabetes Mellitus: Prediabetes, diet controlled  Lab Results   Component Value Date/Time    HBA1C 5.8 (H) 02/17/2023 06:52 AM    HBA1C 5.6 01/25/2022 10:40 AM     3. Hypertension: On medication  4. Dyslipidemia: On statin  Cholesterol,Tot   Date Value Ref Range Status   02/17/2023 194 100 - 199 mg/dL Final     LDL   Date Value Ref Range Status   02/17/2023 113 (H) <100 mg/dL Final     HDL   Date Value Ref Range Status   02/17/2023 45 >=40 mg/dL Final     Triglycerides   Date Value Ref Range Status   02/17/2023 178 (H) 0 - 149 mg/dL Final     5. Family history of early Coronary Artery Disease in a first degree relative (Male less than 55 years of age; Female less than 65 " "years of age): Father with MI at 49  6.  Obesity and/or Metabolic Syndrome: Body mass index is 32.84 kg/m².  7. Sedentary lifestyle: Not sedentary    Review of Systems   Constitutional: Negative for fever, malaise/fatigue and night sweats.   Cardiovascular:  Negative for chest pain, dyspnea on exertion, irregular heartbeat, leg swelling, near-syncope, orthopnea, palpitations, paroxysmal nocturnal dyspnea and syncope.   Respiratory:  Negative for cough, shortness of breath and wheezing.    Gastrointestinal:  Negative for abdominal pain, diarrhea, nausea and vomiting.   Neurological:  Negative for dizziness, focal weakness and weakness.       All other systems reviewed and are negative.       Current Outpatient Medications   Medication Sig Dispense Refill    carvedilol (COREG) 3.125 MG Tab Take 1 Tablet by mouth 2 times a day with meals. 60 Tablet 1    atorvastatin (LIPITOR) 20 MG Tab Take 1 Tablet by mouth every evening. 100 Tablet 1    omeprazole (PRILOSEC) 20 MG delayed-release capsule Take 1 Capsule by mouth every day. 100 Capsule 1    aspirin 81 MG EC tablet Take 1 Tablet by mouth every day. (Patient not taking: Reported on 11/1/2023) 100 Tablet 2     No current facility-administered medications for this visit.         No Known Allergies    Physical Exam:  Ambulatory Vitals  /76 (BP Location: Left arm, Patient Position: Sitting, BP Cuff Size: Adult)   Pulse 73   Resp 16   Ht 1.727 m (5' 8\")   Wt 98 kg (216 lb)   SpO2 97%    Oxygen Therapy:  Pulse Oximetry: 97 %  BP Readings from Last 4 Encounters:   11/01/23 122/76   10/26/23 128/76   10/03/23 120/70   03/06/23 122/62       Weight/BMI: Body mass index is 32.84 kg/m².  Wt Readings from Last 4 Encounters:   11/01/23 98 kg (216 lb)   10/26/23 98.1 kg (216 lb 3.2 oz)   10/03/23 96.4 kg (212 lb 9.6 oz)   03/06/23 96.6 kg (212 lb 13.7 oz)       General: Well appearing and in no apparent distress  Eyes: nl conjunctiva, no icteric sclera  ENT: normal external " appearance of ears, nose, and throat  Neck: no visible JVP,  no carotid bruits  Lungs: normal respiratory effort, CTAB  Heart: RRR, no murmurs, no rubs or gallops,  no edema bilateral lower extremities. No LV/RV heave on cardiac palpatation. + bilateral radial pulses.  + bilateral dp pulses.   Abdomen: soft, non tender, non distended, no masses, normal bowel sounds.  No HSM.  Extremities/MSK: no clubbing, no cyanosis  Neurological: No focal sensory deficits  Psychiatric: Appropriate affect, A/O x 3, intact judgement and insight  Skin: Warm extremities      Lab Data Review:  Lab Results   Component Value Date/Time    CHOLSTRLTOT 194 02/17/2023 06:52 AM     (H) 02/17/2023 06:52 AM    HDL 45 02/17/2023 06:52 AM    TRIGLYCERIDE 178 (H) 02/17/2023 06:52 AM       Lab Results   Component Value Date/Time    SODIUM 138 02/17/2023 06:52 AM    POTASSIUM 4.5 02/17/2023 06:52 AM    CHLORIDE 102 02/17/2023 06:52 AM    CO2 25 02/17/2023 06:52 AM    GLUCOSE 112 (H) 02/17/2023 06:52 AM    BUN 13 02/17/2023 06:52 AM    CREATININE 0.79 02/17/2023 06:52 AM     Lab Results   Component Value Date/Time    ALKPHOSPHAT 78 02/17/2023 06:52 AM    ASTSGOT 28 02/17/2023 06:52 AM    ALTSGPT 33 02/17/2023 06:52 AM    TBILIRUBIN 0.5 02/17/2023 06:52 AM      Lab Results   Component Value Date/Time    WBC 6.7 02/17/2023 06:52 AM    HEMOGLOBIN 17.2 02/17/2023 06:52 AM     Lab Results   Component Value Date/Time    HBA1C 5.8 (H) 02/17/2023 06:52 AM    HBA1C 5.6 01/25/2022 10:40 AM         Cardiac Imaging and Procedures Review:    EKG dated 11/1/2023: My personal interpretation is sinus rhythm, consider left atrial enlargement, nonspecific intraventricular conduction delay    No prior echocardiogram on file    Nuclear Perfusion Imaging (10/19/2023):    NUCLEAR IMAGING INTERPRETATION   Questionable moderate sized reversible perfusion defect of the inferior wall.      SDS would be 3.   No focal wall motion abnormality seen.   LVEF 57%.   ECG  INTERPRETATION   ST depression 1mm horizontal lateral leads at peak stress.      Assessment & Plan     1. Chest pain, unspecified type  EKG    CL-LEFT HEART CATHETERIZATION WITH POSSIBLE INTERVENTION    EC-ECHOCARDIOGRAM COMPLETE W/O CONT      2. Angina pectoris (HCC)  CL-LEFT HEART CATHETERIZATION WITH POSSIBLE INTERVENTION    EC-ECHOCARDIOGRAM COMPLETE W/O CONT      3. Abnormal cardiovascular stress test  CL-LEFT HEART CATHETERIZATION WITH POSSIBLE INTERVENTION    EC-ECHOCARDIOGRAM COMPLETE W/O CONT      4. Dyslipidemia              Shared Medical Decision Making:    Abnormal stress test  Angina pectoris  I personally reviewed the nuclear stress test from 10/19/2023, which showed reversible defect in the inferior wall. Symptoms and status was also concerning for ischemia.  -Discussed risks, benefits, rationale, appropriateness and alternatives to coronary angiography with IV sedation in great detail with the patient.  Complications including but not limited to death, stroke, MI, urgent bypass surgery, contrast nephropathy, vascular complications, bleeding and infection were explained.  In addition, we discussed that 10% of patients will experience small to moderate bruising at the side of the arterial puncture.  Risks of major complications such as heart attack or stroke caused by the angiogram is less than 1%; the risk of death is approximately 1 in 1000.  The potential outcomes associated with the procedure (possible PCI, possible CABG, possible medical Rx only) were also discussed at length.  Patient voices understanding and with shared decision making, is in agreement to proceed.  -Start aspirin 81mg daily  -Continue atorvastatin 20mg daily  -Continue carvedilol 3.125mg twice daily  -Obtain echocardiogram prior to cath.    Dyslipidemia  -Continue atorvastatin 20mg daily    Hypertension  BP well controlled this visit  -Continue carvedilol 3.125mg twice daily    A total of 61 minutes of time was spent on day of  encounter reviewing medical record, performing history and examination, counseling, ordering medication/test/consults and documentation.      All of the patient's excellent questions were answered to the best of my knowledge and to his satisfaction.  It was a pleasure seeing Mr. Joe Morelos in my clinic today. Return in about 3 months (around 2/1/2024). Patient is aware to call the cardiology clinic with any questions or concerns.      Mariza Eldridge MD  [facility], [facility]  [address] [address]  Phone: [phone]  Fax: [phone]

## 2023-11-02 ENCOUNTER — TELEPHONE (OUTPATIENT)
Dept: CARDIOLOGY | Facility: MEDICAL CENTER | Age: 68
End: 2023-11-02
Payer: MEDICARE

## 2023-11-02 NOTE — TELEPHONE ENCOUNTER
HK  Caller: Joe Morelos     Topic/issue: Patient returning phone call.    Callback Number: [phone]      Thank you   Ines MCCRAY

## 2023-11-02 NOTE — TELEPHONE ENCOUNTER
Called and spoke with patient, discussed echo results and HK recommendations.  Patient verbalized understanding.

## 2023-11-02 NOTE — TELEPHONE ENCOUNTER
----- Message from Mariza Eldridge M.D. sent at 11/1/2023  4:42 PM PDT -----  Please let him know that his heart function was normal.  There was mild aortic insufficiency and mild dilatation of ascending aorta, which we will need to monitor via echocardiogram yearly.    Thank you.

## 2023-11-03 ENCOUNTER — TELEPHONE (OUTPATIENT)
Dept: CARDIOLOGY | Facility: MEDICAL CENTER | Age: 68
End: 2023-11-03
Payer: MEDICARE

## 2023-11-06 NOTE — TELEPHONE ENCOUNTER
Per patients request, patient is scheduled on 12-21-23 for a LHC w/poss with Dr. Eduard Garrison. No  meds to stop and patient to check in at 7:30 for a 9:30 procedure. Updated H&P to be done on admit by NP. Pre admit to call patient.

## 2023-11-14 ENCOUNTER — APPOINTMENT (OUTPATIENT)
Dept: ADMISSIONS | Facility: MEDICAL CENTER | Age: 68
End: 2023-11-14
Attending: STUDENT IN AN ORGANIZED HEALTH CARE EDUCATION/TRAINING PROGRAM
Payer: MEDICARE

## 2023-11-18 DIAGNOSIS — I20.89 STABLE ANGINA (HCC): ICD-10-CM

## 2023-11-20 RX ORDER — CARVEDILOL 3.12 MG/1
3.12 TABLET ORAL 2 TIMES DAILY WITH MEALS
Qty: 200 TABLET | Refills: 1 | Status: ON HOLD | OUTPATIENT
Start: 2023-11-20 | End: 2024-01-23

## 2023-11-20 NOTE — TELEPHONE ENCOUNTER
Request for 90 day supply.    Received request via: Pharmacy    Was the patient seen in the last year in this department? Yes    Does the patient have an active prescription (recently filled or refills available) for medication(s) requested? No    Does the patient have half-way Plus and need 100 day supply (blood pressure, diabetes and cholesterol meds only)? Yes, quantity updated to 100 days

## 2023-12-01 ENCOUNTER — PRE-ADMISSION TESTING (OUTPATIENT)
Dept: ADMISSIONS | Facility: MEDICAL CENTER | Age: 68
End: 2023-12-01
Attending: INTERNAL MEDICINE
Payer: MEDICARE

## 2023-12-20 ENCOUNTER — PRE-ADMISSION TESTING (OUTPATIENT)
Dept: ADMISSIONS | Facility: MEDICAL CENTER | Age: 68
End: 2023-12-20
Attending: STUDENT IN AN ORGANIZED HEALTH CARE EDUCATION/TRAINING PROGRAM
Payer: MEDICARE

## 2023-12-20 DIAGNOSIS — Z01.810 PRE-OPERATIVE CARDIOVASCULAR EXAMINATION: ICD-10-CM

## 2023-12-20 DIAGNOSIS — Z01.812 PRE-OPERATIVE LABORATORY EXAMINATION: ICD-10-CM

## 2023-12-20 LAB
ALBUMIN SERPL BCP-MCNC: 4.1 G/DL (ref 3.2–4.9)
ALBUMIN/GLOB SERPL: 1.1 G/DL
ALP SERPL-CCNC: 83 U/L (ref 30–99)
ALT SERPL-CCNC: 21 U/L (ref 2–50)
ANION GAP SERPL CALC-SCNC: 10 MMOL/L (ref 7–16)
APTT PPP: 29.6 SEC (ref 24.7–36)
AST SERPL-CCNC: 18 U/L (ref 12–45)
BILIRUB SERPL-MCNC: 0.4 MG/DL (ref 0.1–1.5)
BUN SERPL-MCNC: 8 MG/DL (ref 8–22)
CALCIUM ALBUM COR SERPL-MCNC: 9.1 MG/DL (ref 8.5–10.5)
CALCIUM SERPL-MCNC: 9.2 MG/DL (ref 8.5–10.5)
CHLORIDE SERPL-SCNC: 98 MMOL/L (ref 96–112)
CO2 SERPL-SCNC: 28 MMOL/L (ref 20–33)
CREAT SERPL-MCNC: 0.73 MG/DL (ref 0.5–1.4)
EKG IMPRESSION: NORMAL
ERYTHROCYTE [DISTWIDTH] IN BLOOD BY AUTOMATED COUNT: 38.9 FL (ref 35.9–50)
GFR SERPLBLD CREATININE-BSD FMLA CKD-EPI: 99 ML/MIN/1.73 M 2
GLOBULIN SER CALC-MCNC: 3.7 G/DL (ref 1.9–3.5)
GLUCOSE SERPL-MCNC: 96 MG/DL (ref 65–99)
HCT VFR BLD AUTO: 44 % (ref 42–52)
HGB BLD-MCNC: 15.1 G/DL (ref 14–18)
INR PPP: 1.07 (ref 0.87–1.13)
MCH RBC QN AUTO: 29.6 PG (ref 27–33)
MCHC RBC AUTO-ENTMCNC: 34.3 G/DL (ref 32.3–36.5)
MCV RBC AUTO: 86.3 FL (ref 81.4–97.8)
PLATELET # BLD AUTO: 400 K/UL (ref 164–446)
PMV BLD AUTO: 8.5 FL (ref 9–12.9)
POTASSIUM SERPL-SCNC: 4.1 MMOL/L (ref 3.6–5.5)
PROT SERPL-MCNC: 7.8 G/DL (ref 6–8.2)
PROTHROMBIN TIME: 14.1 SEC (ref 12–14.6)
RBC # BLD AUTO: 5.1 M/UL (ref 4.7–6.1)
SODIUM SERPL-SCNC: 136 MMOL/L (ref 135–145)
WBC # BLD AUTO: 7.7 K/UL (ref 4.8–10.8)

## 2023-12-20 PROCEDURE — 36415 COLL VENOUS BLD VENIPUNCTURE: CPT

## 2023-12-20 PROCEDURE — 93005 ELECTROCARDIOGRAM TRACING: CPT

## 2023-12-20 PROCEDURE — 85610 PROTHROMBIN TIME: CPT

## 2023-12-20 PROCEDURE — 85730 THROMBOPLASTIN TIME PARTIAL: CPT

## 2023-12-20 PROCEDURE — 85027 COMPLETE CBC AUTOMATED: CPT

## 2023-12-20 PROCEDURE — 93010 ELECTROCARDIOGRAM REPORT: CPT | Performed by: INTERNAL MEDICINE

## 2023-12-20 PROCEDURE — 80053 COMPREHEN METABOLIC PANEL: CPT

## 2023-12-21 ENCOUNTER — HOSPITAL ENCOUNTER (OUTPATIENT)
Facility: MEDICAL CENTER | Age: 68
End: 2023-12-21
Attending: INTERNAL MEDICINE | Admitting: INTERNAL MEDICINE
Payer: MEDICARE

## 2023-12-21 ENCOUNTER — TELEPHONE (OUTPATIENT)
Dept: OPHTHALMOLOGY | Facility: MEDICAL CENTER | Age: 68
End: 2023-12-21

## 2023-12-21 ENCOUNTER — APPOINTMENT (OUTPATIENT)
Dept: CARDIOLOGY | Facility: MEDICAL CENTER | Age: 68
End: 2023-12-21
Attending: STUDENT IN AN ORGANIZED HEALTH CARE EDUCATION/TRAINING PROGRAM
Payer: MEDICARE

## 2023-12-21 VITALS
OXYGEN SATURATION: 96 % | RESPIRATION RATE: 17 BRPM | TEMPERATURE: 97.4 F | HEIGHT: 70 IN | HEART RATE: 69 BPM | DIASTOLIC BLOOD PRESSURE: 73 MMHG | SYSTOLIC BLOOD PRESSURE: 102 MMHG | WEIGHT: 206.79 LBS | BODY MASS INDEX: 29.6 KG/M2

## 2023-12-21 DIAGNOSIS — I20.9 ANGINA PECTORIS (HCC): ICD-10-CM

## 2023-12-21 DIAGNOSIS — R07.9 CHEST PAIN, UNSPECIFIED TYPE: ICD-10-CM

## 2023-12-21 DIAGNOSIS — R94.39 ABNORMAL CARDIOVASCULAR STRESS TEST: ICD-10-CM

## 2023-12-21 LAB — ACT BLD: 228 SEC (ref 74–137)

## 2023-12-21 PROCEDURE — 85347 COAGULATION TIME ACTIVATED: CPT

## 2023-12-21 PROCEDURE — 99152 MOD SED SAME PHYS/QHP 5/>YRS: CPT | Performed by: INTERNAL MEDICINE

## 2023-12-21 PROCEDURE — 93571 IV DOP VEL&/PRESS C FLO 1ST: CPT | Mod: 26,52,LD | Performed by: INTERNAL MEDICINE

## 2023-12-21 PROCEDURE — 93458 L HRT ARTERY/VENTRICLE ANGIO: CPT | Mod: 26 | Performed by: INTERNAL MEDICINE

## 2023-12-21 PROCEDURE — 160046 HCHG PACU - 1ST 60 MINS PHASE II

## 2023-12-21 PROCEDURE — 700102 HCHG RX REV CODE 250 W/ 637 OVERRIDE(OP)

## 2023-12-21 PROCEDURE — 160002 HCHG RECOVERY MINUTES (STAT)

## 2023-12-21 PROCEDURE — 160036 HCHG PACU - EA ADDL 30 MINS PHASE I

## 2023-12-21 PROCEDURE — 99153 MOD SED SAME PHYS/QHP EA: CPT

## 2023-12-21 PROCEDURE — 700101 HCHG RX REV CODE 250

## 2023-12-21 PROCEDURE — A9270 NON-COVERED ITEM OR SERVICE: HCPCS

## 2023-12-21 PROCEDURE — 700111 HCHG RX REV CODE 636 W/ 250 OVERRIDE (IP)

## 2023-12-21 PROCEDURE — 700117 HCHG RX CONTRAST REV CODE 255: Performed by: INTERNAL MEDICINE

## 2023-12-21 PROCEDURE — 160035 HCHG PACU - 1ST 60 MINS PHASE I

## 2023-12-21 RX ORDER — ASPIRIN 81 MG/1
TABLET, CHEWABLE ORAL
Status: COMPLETED
Start: 2023-12-21 | End: 2023-12-21

## 2023-12-21 RX ORDER — MIDAZOLAM HYDROCHLORIDE 1 MG/ML
INJECTION INTRAMUSCULAR; INTRAVENOUS
Status: COMPLETED
Start: 2023-12-21 | End: 2023-12-21

## 2023-12-21 RX ORDER — HEPARIN SODIUM 200 [USP'U]/100ML
INJECTION, SOLUTION INTRAVENOUS
Status: COMPLETED
Start: 2023-12-21 | End: 2023-12-21

## 2023-12-21 RX ORDER — VERAPAMIL HYDROCHLORIDE 2.5 MG/ML
INJECTION, SOLUTION INTRAVENOUS
Status: COMPLETED
Start: 2023-12-21 | End: 2023-12-21

## 2023-12-21 RX ORDER — SODIUM CHLORIDE 9 MG/ML
INJECTION, SOLUTION INTRAVENOUS ONCE
Status: DISCONTINUED | OUTPATIENT
Start: 2023-12-21 | End: 2023-12-21 | Stop reason: HOSPADM

## 2023-12-21 RX ORDER — LIDOCAINE HYDROCHLORIDE 20 MG/ML
INJECTION, SOLUTION INFILTRATION; PERINEURAL
Status: COMPLETED
Start: 2023-12-21 | End: 2023-12-21

## 2023-12-21 RX ORDER — HEPARIN SODIUM 1000 [USP'U]/ML
INJECTION, SOLUTION INTRAVENOUS; SUBCUTANEOUS
Status: COMPLETED
Start: 2023-12-21 | End: 2023-12-21

## 2023-12-21 RX ADMIN — IOHEXOL 33 ML: 350 INJECTION, SOLUTION INTRAVENOUS at 10:59

## 2023-12-21 RX ADMIN — VERAPAMIL HYDROCHLORIDE 2.5 MG: 2.5 INJECTION, SOLUTION INTRAVENOUS at 10:40

## 2023-12-21 RX ADMIN — FENTANYL CITRATE 100 MCG: 50 INJECTION, SOLUTION INTRAMUSCULAR; INTRAVENOUS at 10:42

## 2023-12-21 RX ADMIN — MIDAZOLAM HYDROCHLORIDE 2 MG: 1 INJECTION, SOLUTION INTRAMUSCULAR; INTRAVENOUS at 10:42

## 2023-12-21 RX ADMIN — MIDAZOLAM HYDROCHLORIDE 2 MG: 1 INJECTION, SOLUTION INTRAMUSCULAR; INTRAVENOUS at 10:59

## 2023-12-21 RX ADMIN — LIDOCAINE HYDROCHLORIDE: 20 INJECTION, SOLUTION INFILTRATION; PERINEURAL at 10:40

## 2023-12-21 RX ADMIN — NITROGLYCERIN 10 ML: 20 INJECTION INTRAVENOUS at 10:42

## 2023-12-21 RX ADMIN — HEPARIN SODIUM: 1000 INJECTION, SOLUTION INTRAVENOUS; SUBCUTANEOUS at 10:40

## 2023-12-21 RX ADMIN — ASPIRIN 81 MG 81 MG: 81 TABLET ORAL at 10:30

## 2023-12-21 RX ADMIN — HEPARIN SODIUM 2000 UNITS: 200 INJECTION, SOLUTION INTRAVENOUS at 10:40

## 2023-12-21 ASSESSMENT — PAIN DESCRIPTION - PAIN TYPE
TYPE: ACUTE PAIN;SURGICAL PAIN
TYPE: SURGICAL PAIN
TYPE: ACUTE PAIN;SURGICAL PAIN
TYPE: ACUTE PAIN;SURGICAL PAIN

## 2023-12-21 ASSESSMENT — FIBROSIS 4 INDEX: FIB4 SCORE: 0.67

## 2023-12-21 NOTE — DISCHARGE INSTRUCTIONS
HOME CARE INSTRUCTIONS    ACTIVITY: Rest and take it easy for the first 24 hours.  A responsible adult is recommended to remain with you during that time.  It is normal to feel sleepy.  We encourage you to not do anything that requires balance, judgment or coordination.    FOR 24 HOURS DO NOT:  Drive, operate machinery or run household appliances.  Drink beer or alcoholic beverages.  Make important decisions or sign legal documents.    SPECIAL INSTRUCTIONS: POST ANGIOGRAM  General Care Instructions  Maintain a bandage over the incision site for 24 hours.  It's normal to find a small bruise or dime-sized lump at the insertion site. This should disappear within a few weeks.  Do not apply lotions or powders to the site.  Do not immerse the catheter insertion site in water (bathtub/swimming) for five days. It is ok to shower 24 hours after the procedure.  You may resume your normal diet immediately; on the day of your procedure, drink 6-10 glasses of water to help flush the contrast liquid out of your system.  If the doctor inserted the catheter in through your groin:  Walking short distances on a flat surface is OK. Limit going up/down stairs for the first 2 days.  DO NOT do yard work, drive, squat, lift heavy objects, or play sports for 2 days; or until your health care provider tells you it is OK.  If the doctor inserted the catheter in your arm:  For 3 days, DO NOT lift anything heavier than 10 pounds (approximately a gallon of milk). DO NOT do any heavy pushing, pulling, or twisting.    Medications  If your current medications need to be changed, you will be provided with an updated list of your medications prior to discharge.  If you take warfarin (Coumadin), resume taking your usual dose the evening after the procedure.  DO NOT STOP taking prescribed blood thinning (anti-platelet) medications unless instructed by your cardiologist.  These medications include:  Aspirin, Clopidogrel (Plavix), Ticagrelor (Brilinta),  or Prasugrel (Effient)   If you take one of the following anticoagulants, RESUME 24 HOURS after your procedure:  Apixiban (Eliquis), Rivaroxaban (Xarelto), Dabigatran (Pradaxa), Edoxaban (Savaysa)  If you take metformin (Glucophage), RESUME 48 HOURS after your procedure.    When to call your healthcare provider  Call your cardiologist right away at [phone] if you have any of the following:   Problems/Concerns taking any of your prescribed heart medicines.   The insertion site has increasing pain, swelling, redness, bleeding, or drainage.   Your arm or leg below where the insertion site changes color, is cool, or is numb.   You have chest pain or shortness of breath that does not go away with rest.   Your pulse feels irregular -- very slow (less than 60 beats/minute) or very fast (over 100 beats/minute).   You have dizziness, fainting, or you are very tired.   You are coughing up blood or yellow or green mucus.   You have chills or a fever over 101°F (38.3°C).    If there is bleeding at the catheter insertion site, apply pressure for 10 minutes.  If bleeding persists, call 911, and continue to hold pressure until advanced medical support arrives.        Exercising Safely After Percutaneous Coronary Intervention (PCI)  After percutaneous coronary intervention (PCI), which involves angioplasty and often stenting, it's important to focus on your heart health. Exercise can help strengthen your heart. It can also help you feel good and improve your overall health. Talk with your health care provider or cardiac rehab team member about good options for you.  Start slowly. Work up to more vigorous exercise as you get stronger. Aim for at least 150 minutes of exercise each week.  Include aerobic activities. These make the heart beat faster. They work the heart and lungs, and improve the body's ability to use oxygen. Good choices include walking, swimming, and biking .  Always follow your doctor's recommendation for  exercise.   You have been referred to cardiac rehabilitation, which is important for your recovery.  You may contact [facility] at [phone] to learn more and schedule a visit.        Lifestyle Management After Percutaneous Coronary Intervention (PCI)  Percutaneous coronary intervention (PCI)  involves angioplasty and often stenting. This procedure can open arteries and relieve symptoms. But, it doesn't cure coronary artery disease. New blockages can still form. You need to take steps to prevent this by managing risk factors. Doing so will help make your heart and arteries healthier. Your doctor may prescribe cardiac rehabilitation to help with this lifelong process.  Understanding risk factors  Some risk factors for coronary artery disease can be controlled. These include smoking, high blood pressure, cholesterol, diabetes, and obesity. They can be managed with medication, diet, and exercise. Support and counseling can also play a role. The effort will pay off! Managing risk factors can help you be more active, feel better, and reduce the risk of heart attack.    If you smoke, quit!  If your doctor has been urging you to quit smoking, it's for good reasons. Smoking damages your heart, blood vessels, and lungs. The good news is that quitting can halt or even reverse the damage of smoking. To quit now:  Get medical help. Ask your doctor for advice on stop-smoking programs. Also ask about medications or nicotine replacement therapy products that may help you quit smoking.  Get support. Join a support group. Ask for help from your family and friends.  Don't give up. It often takes several tries to succeed in quitting smoking.  Avoid secondhand smoke. Ask family and friends not to smoke around you.      DIET: To avoid nausea, slowly advance diet as tolerated, avoiding spicy or greasy foods for the first day.  Add more substantial food to your diet according to your physician's instructions.   Babies can be fed formula or breast milk as soon as they are hungry.  INCREASE FLUIDS AND FIBER TO AVOID CONSTIPATION.    SURGICAL DRESSING/BATHING: May remove dressing in 24 hours. May shower after removing. Do not soak in bath/pool/hot tub/lake for at least 1 week    MEDICATIONS: Resume taking daily medication.  Take prescribed pain medication with food.  If no medication is prescribed, you may take non-aspirin pain medication if needed.  PAIN MEDICATION CAN BE VERY CONSTIPATING.  Take a stool softener or laxative such as senokot, pericolace, or milk of magnesia if needed.    A follow-up appointment should be arranged with your doctor in 1 week with Cardiothoracic Surgery [phone]; call to schedule.    You should CALL YOUR PHYSICIAN if you develop:  Fever greater than 101 degrees F.  Pain not relieved by medication, or persistent nausea or vomiting.  Excessive bleeding (blood soaking through dressing) or unexpected drainage from the wound.  Extreme redness or swelling around the incision site, drainage of pus or foul smelling drainage.  Inability to urinate or empty your bladder within 8 hours.  Problems with breathing or chest pain.    You should call 911 if you develop problems with breathing or chest pain.  If you are unable to contact your doctor or surgical center, you should go to the nearest emergency room or urgent care center.  Physician's telephone #: Dr. Garrison [phone]     MILD FLU-LIKE SYMPTOMS ARE NORMAL.  YOU MAY EXPERIENCE GENERALIZED MUSCLE ACHES, THROAT IRRITATION, HEADACHE AND/OR SOME NAUSEA.    If any questions arise, call your doctor.  If your doctor is not available, please feel free to call the Surgical Center at [phone].  The Center is open Monday through Friday from 7AM to 7PM.      A registered nurse may call you a few days after your surgery to see how you are doing after your procedure.    You may also receive a survey in the mail within the next two weeks and we ask  that you take a few moments to complete the survey and return it to us.  Our goal is to provide you with very good care and we value your comments.     Depression / Suicide Risk    As you are discharged from this [facility] facility, it is important to learn how to keep safe from harming yourself.    Recognize the warning signs:  Abrupt changes in personality, positive or negative- including increase in energy   Giving away possessions  Change in eating patterns- significant weight changes-  positive or negative  Change in sleeping patterns- unable to sleep or sleeping all the time   Unwillingness or inability to communicate  Depression  Unusual sadness, discouragement and loneliness  Talk of wanting to die  Neglect of personal appearance   Rebelliousness- reckless behavior  Withdrawal from people/activities they love  Confusion- inability to concentrate     If you or a loved one observes any of these behaviors or has concerns about self-harm, here's what you can do:  Talk about it- your feelings and reasons for harming yourself  Remove any means that you might use to hurt yourself (examples: pills, rope, extension cords, firearm)  Get professional help from the community (Mental Health, Substance Abuse, psychological counseling)  Do not be alone:Call your Safe Contact- someone whom you trust who will be there for you.  Call your local CRISIS HOTLINE 889-5247 or [phone]  Call your local Children's Mobile Crisis Response Team [place] (240) 866-3404 or www.[employer]  Call the toll free National Suicide Prevention Hotlines   National Suicide Prevention Lifeline 410-293-QGOJ (9306)  National Hope Line Network 800-SUICIDE (743-5635)    I acknowledge receipt and understanding of these Home Care instructions.

## 2023-12-21 NOTE — PROCEDURES
Cardiac Catheterization Laboratory Procedure Note    DATE: 12/21/2023    : Eduard Garrison MD, MPH    PROCEDURES PERFORMED:  Left heart catheterization  Coronary angiography  Instantaneous wave-free ratio assessment of the proximal/mid left anterior descending coronary artery  Moderate conscious sedation    INDICATIONS:  Stable angina, positive cardiac stress test    CONSENT:  The complete alternatives, risks, and benefits of the procedure were explained to the patient.  Informed consent was obtained prior to the procedure.  A timeout was performed prior to beginning procedure.    MEDICATIONS:  Lidocaine  Fentanyl  Midazolam  Nitroglycerin  Verapamil  Heparin    MODERATE CONSCIOUS SEDATION:  I personally supervised the administration of moderate conscious sedation by the nursing staff for 27 minutes.  Sedation start time 10:32 AM  Sedation end time 10:59 AM    CONTRAST: Omnipaque 33 cc    RADIATION DOSE (Air Kerma): 243 mGy    FLUOROSCOPY TIME: 5.6 minutes    ACCESS: 6-Greek Glidesheath in the right radial artery.    ESTIMATED BLOOD LOSS: <10 cc    COMPLICATIONS: None    PROCEDURE IN DETAIL:  The patient was brought to the cardiac catheterization laboratory in the fasting state.  The skin over the right wrist was prepped and draped in the usual sterile fashion. Lidocaine infiltration was used to anesthetize the tissue over the right radial artery.  Using the micropuncture technique, a 6-Greek Glidesheath was inserted in the right radial artery.  A 5-Greek Terumo Tiger diagnostic catheter was then advanced over a standard J-wire into the left ventricular cavity where it was gently aspirated, flushed, and then withdrawn across the aortic valve with sequential pressures measured.  This catheter was then used to engage the ostium of the right coronary artery and cineangiograms were obtained in multiple projections for complete evaluation of the right coronary system. This catheter used to engage the ostium of  "the left coronary artery and cineangiograms were obtained in multiple projections for complete evaluation of the left coronary system.  Following completion of coronary angiography, we proceeded with IFR of the proximal/mid left anterior descending coronary artery.    The Terumo [place] catheter was exchanged over a J-wire for a 6 Croatian EBU 3.5 guide catheter, which was used to reengage the ostium left coronary artery.  A 0.014\" Omni wire was then advanced into the left anterior descending coronary artery where pressures were normalized.   The wire was then advanced across the lesion in the proximal/mid left anterior descending coronary artery and IFR was performed with a minimum value of 0.75.  The wire was then withdrawn into the left main coronary artery with equalization of pressures to 1.  One final cineangiogram was then obtained, which demonstrated no evidence of wire perforation thrombus or dissection.  At the completion of the case, all wires, catheters, and sheaths were removed.  A TR band was placed using the patent hemostasis technique.    HEMODYNAMICS:   Aortic pressure: 120 /70 mmHg  LVEDP: 18 mmHg  No significant aortic gradient on pullback    CORONARY ANGIOGRAPHY:  The left main coronary artery : Large-caliber vessel with mild luminal irregularities, bifurcates to LAD and left circumflex  The left anterior descending coronary artery : Large-caliber transapical vessel with calcified proximal/midportion with diffuse disease throughout the proximal/mid LAD at 70%, iFR 0.75 suggestive of hemodynamically significant stenosis.  Left-to-right collaterals  The left circumflex coronary artery : Large-caliber vessel that gives rise to medium OM1 with diffuse nonobstructive CAD, large OM 2 that has severe 80% proximal stenosis before its bifurcation.  Left-to-right collaterals.  The right coronary artery  : Large-caliber dominant vessel with  of its midportion fills via left-to-right " collaterals    INSTANTANEOUS WAVE FREE RATIO:  IFR of the proximal/mid left anterior descending coronary artery was 0.75: suggestive of a hemodynamically significant stenosis.    IMPRESSION:  Severe three-vessel CAD (mid RCA , mid cx-OM and prox/mid LAD IFR 0.75)  Mildly elevated resting LVEDP 18 mmHg with no significant transaortic gradient on pullback    RECOMMENDATIONS:  CT surgery have been consulted, they will arrange for outpatient consultation to discuss CABG as a revascularization option for this patient  Ongoing optimal secondary ASCVD prevention  TR band protocol    NOTIFICATION:  The patient updated with results and recommendations of CABG consideration    Referring provider - Dr. Eldridge - was notified.    Eduard Garrison MD, MPH [facility]  Interventional Cardiologist  [facility]   of Clinical Internal Medicine - [facility] Raffy LEONARD

## 2023-12-26 NOTE — PROGRESS NOTES
REFERRING PHYSICIAN: Eduard Garrison MD.     CONSULTING PHYSICIAN: Arnaldo Bacon DO     CHIEF COMPLAINT: ***Chest pain    HISTORY OF PRESENT ILLNESS: The patient is a 68 y.o. male ***  Today he states     Referred to cardiology by PCP for CP with abnormal stress test.  LHC shows 3 vessel disease.  CP on exertion started 06/2023    Retired contractor now owns a small bank.    PMH HLD, prediabetes, obesity, HTN, PUD, GERD    PAST MEDICAL HISTORY:   Active Ambulatory Problems     Diagnosis Date Noted    Peptic ulcer disease 07/28/2020    Hyperglycemia 07/28/2020    Pancreatic lesion 07/28/2020    Dyslipidemia 03/31/2021    Pain in left foot 10/25/2021    ED (erectile dysfunction) 08/05/2022    Elevated prostate specific antigen (PSA) 08/05/2022    BMI 31.0-31.9,adult 08/05/2022    Obesity (BMI 30.0-34.9) 08/05/2022    Elevated blood pressure reading in office without diagnosis of hypertension 08/05/2022    Obesity (BMI 30-39.9) 03/07/2023    Angina pectoris (HCC) 11/01/2023     Resolved Ambulatory Problems     Diagnosis Date Noted    BMI 27.0-27.9,adult 07/28/2020     Past Medical History:   Diagnosis Date    Heart burn     High cholesterol     Hypertension        PAST SURGICAL HISTORY:   Past Surgical History:   Procedure Laterality Date    PB RECONSTRUC TOE DEFORM,SOFT TISSUE Left 12/30/2021    Procedure: LEFT FOOT HALLUX VALGUS CORRECTION, LEFT AKIN OSTEOTOMY,;  Surgeon: Ollie Kovacs M.D.;  Location: [facility];  Service: Orthopedics    PB CORRECT BUNION,PHALANX OSTEOTOMY Left 12/30/2021    Procedure: LEFT DISTAL SOFT TISSUE PROCEDURE;  Surgeon: Ollie Kovacs M.D.;  Location: [facility];  Service: Orthopedics    PB OSTEOTOMY METATARSALS,MULTIPLE Left 12/30/2021    Procedure: LEFT SECOND, THIRD DISTAL METATARSAL OSTEOTOMY;  Surgeon: Ollie Kovacs M.D.;  Location: [facility];  Service: Orthopedics    PB PART EXCIS 5TH METATARSAL HEAD Left  12/30/2021    Procedure: LEFT SECOND, THIRD METATARSOPHALANGEAL JOINT RECONSTRUCTION;  Surgeon: Ollie Kovacs M.D.;  Location: [facility];  Service: Orthopedics    PB REPAIR OF HAMMERTOE,ONE Left 12/30/2021    Procedure: LEFT SECOND AND THIRD HAMMERTOE CORRECTION;  Surgeon: Ollie Kovacs M.D.;  Location: [facility];  Service: Orthopedics    PB EXCIS INTERDIGITAL NEUROMA,EA Left 12/30/2021    Procedure: LEFT SECOND WEBSPACE NEUROMA EXCISION;  Surgeon: Ollie Kovacs M.D.;  Location: [facility];  Service: Orthopedics        ALLERGIES: No Known Allergies     CURRENT MEDICATIONS:   Current Outpatient Medications:     Sildenafil Citrate (VIAGRA PO), Take  by mouth as needed (Erectile dysfunction)., Disp: , Rfl:     carvedilol (COREG) 3.125 MG Tab, TAKE 1 TABLET BY MOUTH TWICE A DAY WITH FOOD (Patient not taking: Reported on 12/20/2023), Disp: 200 Tablet, Rfl: 1    aspirin 81 MG EC tablet, Take 1 Tablet by mouth every day., Disp: 100 Tablet, Rfl: 2    atorvastatin (LIPITOR) 20 MG Tab, Take 1 Tablet by mouth every evening., Disp: 100 Tablet, Rfl: 1    omeprazole (PRILOSEC) 20 MG delayed-release capsule, Take 1 Capsule by mouth every day. (Patient not taking: Reported on 12/20/2023), Disp: 100 Capsule, Rfl: 1    FAMILY HISTORY:   Family History   Problem Relation Age of Onset    Heart Disease Mother         CHF    Heart Disease Father         bypass age 49, 75        SOCIAL HISTORY:   Social History     Socioeconomic History    Marital status: Single     Spouse name: Not on file    Number of children: Not on file    Years of education: Not on file    Highest education level: Not on file   Occupational History    Not on file   Tobacco Use    Smoking status: Never    Smokeless tobacco: Never   Vaping Use    Vaping Use: Never used   Substance and Sexual Activity    Alcohol use: Yes     Alcohol/week: 0.6 oz     Types: 1 Shots of liquor per week     Comment:  once a month    Drug use: Not Currently    Sexual activity: Not on file   Other Topics Concern    Not on file   Social History Narrative    Not on file     Social Determinants of Health     Financial Resource Strain: Not on file   Food Insecurity: Not on file   Transportation Needs: Not on file   Physical Activity: Not on file   Stress: Not on file   Social Connections: Not on file   Intimate Partner Violence: Not on file   Housing Stability: Not on file       REVIEW OF SYSTEMS:      PHYSICAL EXAMINATION:    There were no vitals taken for this visit.         LABS REVIEWED:  Lab Results   Component Value Date/Time    SODIUM 136 12/20/2023 10:03 AM    POTASSIUM 4.1 12/20/2023 10:03 AM    CHLORIDE 98 12/20/2023 10:03 AM    CO2 28 12/20/2023 10:03 AM    GLUCOSE 96 12/20/2023 10:03 AM    BUN 8 12/20/2023 10:03 AM    CREATININE 0.73 12/20/2023 10:03 AM      Lab Results   Component Value Date/Time    PROTHROMBTM 14.1 12/20/2023 10:03 AM    INR 1.07 12/20/2023 10:03 AM      Lab Results   Component Value Date/Time    WBC 7.7 12/20/2023 10:03 AM    RBC 5.10 12/20/2023 10:03 AM    HEMOGLOBIN 15.1 12/20/2023 10:03 AM    HEMATOCRIT 44.0 12/20/2023 10:03 AM    MCV 86.3 12/20/2023 10:03 AM    MCH 29.6 12/20/2023 10:03 AM    MCHC 34.3 12/20/2023 10:03 AM    MPV 8.5 (L) 12/20/2023 10:03 AM    NEUTSPOLYS 67.10 02/17/2023 06:52 AM    LYMPHOCYTES 22.70 02/17/2023 06:52 AM    MONOCYTES 7.20 02/17/2023 06:52 AM    EOSINOPHILS 1.50 02/17/2023 06:52 AM    BASOPHILS 0.60 02/17/2023 06:52 AM        IMAGING REVIEWED AND INTERPRETED:    ECHOCARDIOGRAM 11/1/23 [facility] [place]  Normal left ventricular systolic function. The ejection fraction is   measured to be 61 % by Reed's biplane.  Normal right ventricular size and systolic function.  Aortic sclerosis without stenosis. Mild aortic insufficiency.  Unable to estimate right ventricular systolic pressure due to an   inadequate tricuspid regurgitant jet.  Mild dilatation of ascending aorta 4.3cm.  No  prior study is available for comparison.    CARDIAC CATHETERIZATION 12/21/23 [facility] [place]  HEMODYNAMICS:   Aortic pressure: 120 /70 mmHg  LVEDP: 18 mmHg  No significant aortic gradient on pullback     CORONARY ANGIOGRAPHY:  The left main coronary artery : Large-caliber vessel with mild luminal irregularities, bifurcates to LAD and left circumflex  The left anterior descending coronary artery : Large-caliber transapical vessel with calcified proximal/midportion with diffuse disease throughout the proximal/mid LAD at 70%, iFR 0.75 suggestive of hemodynamically significant stenosis.  Left-to-right collaterals  The left circumflex coronary artery : Large-caliber vessel that gives rise to medium OM1 with diffuse nonobstructive CAD, large OM 2 that has severe 80% proximal stenosis before its bifurcation.  Left-to-right collaterals.  The right coronary artery  : Large-caliber dominant vessel with  of its midportion fills via left-to-right collaterals     INSTANTANEOUS WAVE FREE RATIO:  IFR of the proximal/mid left anterior descending coronary artery was 0.75: suggestive of a hemodynamically significant stenosis.     IMPRESSION:  Severe three-vessel CAD (mid RCA , mid cx-OM and prox/mid LAD IFR 0.75)  Mildly elevated resting LVEDP 18 mmHg with no significant transaortic gradient on pullback     RECOMMENDATIONS:  CT surgery have been consulted, they will arrange for outpatient consultation to discuss CABG as a revascularization option for this patient  Ongoing optimal secondary ASCVD prevention  TR band protocol    CT SCAN CHEST ***      IMPRESSION:  ***      PLAN:  ***    Sincerely,     Arnaldo Bacon DO

## 2023-12-28 ENCOUNTER — APPOINTMENT (OUTPATIENT)
Dept: CARDIOTHORACIC SURGERY | Facility: MEDICAL CENTER | Age: 68
End: 2023-12-28
Payer: MEDICARE

## 2023-12-28 ENCOUNTER — OFFICE VISIT (OUTPATIENT)
Dept: CARDIOTHORACIC SURGERY | Facility: MEDICAL CENTER | Age: 68
End: 2023-12-28
Payer: MEDICARE

## 2023-12-28 VITALS
OXYGEN SATURATION: 96 % | TEMPERATURE: 99 F | DIASTOLIC BLOOD PRESSURE: 80 MMHG | SYSTOLIC BLOOD PRESSURE: 142 MMHG | HEIGHT: 70 IN | HEART RATE: 78 BPM | WEIGHT: 206 LBS | BODY MASS INDEX: 29.49 KG/M2

## 2023-12-28 DIAGNOSIS — I25.10 CORONARY ARTERY DISEASE DUE TO CALCIFIED CORONARY LESION: ICD-10-CM

## 2023-12-28 DIAGNOSIS — I25.84 CORONARY ARTERY DISEASE DUE TO CALCIFIED CORONARY LESION: ICD-10-CM

## 2023-12-28 PROCEDURE — 3079F DIAST BP 80-89 MM HG: CPT | Performed by: THORACIC SURGERY (CARDIOTHORACIC VASCULAR SURGERY)

## 2023-12-28 PROCEDURE — 99205 OFFICE O/P NEW HI 60 MIN: CPT | Mod: 57 | Performed by: THORACIC SURGERY (CARDIOTHORACIC VASCULAR SURGERY)

## 2023-12-28 PROCEDURE — 3077F SYST BP >= 140 MM HG: CPT | Performed by: THORACIC SURGERY (CARDIOTHORACIC VASCULAR SURGERY)

## 2023-12-28 ASSESSMENT — ENCOUNTER SYMPTOMS
COUGH: 0
PALPITATIONS: 0
HEMOPTYSIS: 0
STRIDOR: 0
HEADACHES: 0
FEVER: 0
DOUBLE VISION: 0
WHEEZING: 0
BRUISES/BLEEDS EASILY: 0
WEAKNESS: 0
DEPRESSION: 0
EYE PAIN: 0
HEARTBURN: 0
EYE DISCHARGE: 0
SEIZURES: 0
SORE THROAT: 0
BLURRED VISION: 0
SPEECH CHANGE: 0
MYALGIAS: 0
POLYDIPSIA: 0
ABDOMINAL PAIN: 0
NAUSEA: 0
CONSTIPATION: 0
NECK PAIN: 0
FOCAL WEAKNESS: 0
HALLUCINATIONS: 0
DIAPHORESIS: 0
SPUTUM PRODUCTION: 0
FLANK PAIN: 0
WEIGHT LOSS: 0
VOMITING: 0
DIZZINESS: 0
CHILLS: 0
ORTHOPNEA: 0

## 2023-12-28 ASSESSMENT — LIFESTYLE VARIABLES: SUBSTANCE_ABUSE: 0

## 2023-12-28 ASSESSMENT — FIBROSIS 4 INDEX: FIB4 SCORE: 0.67

## 2023-12-28 NOTE — PROGRESS NOTES
REFERRING PHYSICIAN: Eduard Garrison MD.     CONSULTING PHYSICIAN: Arnaldo Bacon DO     CHIEF COMPLAINT: Chest pain    HISTORY OF PRESENT ILLNESS: The patient is a 68 y.o. male with past medical history of dyslipidemia, hypertension, prediabetes and obesity who presents with chest pain on exertion. His symptoms are relived with rest. He denies shortness of breath, orthopnea, PND, dizziness and sycnope. He was his PCP and had a stress test which lead to an angiogram that revealed multivessel coronary artery disease. He is retired and owns a bank.  He used to walk five miles a day but has recently slowed down.     PAST MEDICAL HISTORY:   Active Ambulatory Problems     Diagnosis Date Noted    Peptic ulcer disease 07/28/2020    Hyperglycemia 07/28/2020    Pancreatic lesion 07/28/2020    Dyslipidemia 03/31/2021    Pain in left foot 10/25/2021    ED (erectile dysfunction) 08/05/2022    Elevated prostate specific antigen (PSA) 08/05/2022    BMI 31.0-31.9,adult 08/05/2022    Obesity (BMI 30.0-34.9) 08/05/2022    Elevated blood pressure reading in office without diagnosis of hypertension 08/05/2022    Obesity (BMI 30-39.9) 03/07/2023    Angina pectoris (HCC) 11/01/2023     Resolved Ambulatory Problems     Diagnosis Date Noted    BMI 27.0-27.9,adult 07/28/2020     Past Medical History:   Diagnosis Date    Heart burn     High cholesterol     Hypertension        PAST SURGICAL HISTORY:   Past Surgical History:   Procedure Laterality Date    PB RECONSTRUC TOE DEFORM,SOFT TISSUE Left 12/30/2021    Procedure: LEFT FOOT HALLUX VALGUS CORRECTION, LEFT AKIN OSTEOTOMY,;  Surgeon: Ollie Kovacs M.D.;  Location: [facility];  Service: Orthopedics    PB CORRECT BUNION,PHALANX OSTEOTOMY Left 12/30/2021    Procedure: LEFT DISTAL SOFT TISSUE PROCEDURE;  Surgeon: Ollie Kovacs M.D.;  Location: [facility];  Service: Orthopedics    PB OSTEOTOMY METATARSALS,MULTIPLE Left 12/30/2021     Procedure: LEFT SECOND, THIRD DISTAL METATARSAL OSTEOTOMY;  Surgeon: Ollie Kovacs M.D.;  Location: [facility];  Service: Orthopedics    PB PART EXCIS 5TH METATARSAL HEAD Left 12/30/2021    Procedure: LEFT SECOND, THIRD METATARSOPHALANGEAL JOINT RECONSTRUCTION;  Surgeon: Ollie Kovacs M.D.;  Location: [facility];  Service: Orthopedics    PB REPAIR OF HAMMERTOE,ONE Left 12/30/2021    Procedure: LEFT SECOND AND THIRD HAMMERTOE CORRECTION;  Surgeon: Ollie Kovacs M.D.;  Location: [facility];  Service: Orthopedics    PB EXCIS INTERDIGITAL NEUROMA,EA Left 12/30/2021    Procedure: LEFT SECOND WEBSPACE NEUROMA EXCISION;  Surgeon: Ollie Kovacs M.D.;  Location: [facility];  Service: Orthopedics        ALLERGIES: No Known Allergies     CURRENT MEDICATIONS:   Current Outpatient Medications:     Sildenafil Citrate (VIAGRA PO), Take  by mouth as needed (Erectile dysfunction)., Disp: , Rfl:     aspirin 81 MG EC tablet, Take 1 Tablet by mouth every day., Disp: 100 Tablet, Rfl: 2    carvedilol (COREG) 3.125 MG Tab, TAKE 1 TABLET BY MOUTH TWICE A DAY WITH FOOD (Patient not taking: Reported on 12/20/2023), Disp: 200 Tablet, Rfl: 1    atorvastatin (LIPITOR) 20 MG Tab, Take 1 Tablet by mouth every evening. (Patient not taking: Reported on 12/28/2023), Disp: 100 Tablet, Rfl: 1    omeprazole (PRILOSEC) 20 MG delayed-release capsule, Take 1 Capsule by mouth every day. (Patient not taking: Reported on 12/20/2023), Disp: 100 Capsule, Rfl: 1    FAMILY HISTORY:   Family History   Problem Relation Age of Onset    Heart Disease Mother         CHF    Heart Disease Father         bypass age 49, 75        SOCIAL HISTORY:   Social History     Socioeconomic History    Marital status: Single     Spouse name: Not on file    Number of children: Not on file    Years of education: Not on file    Highest education level: Not on file   Occupational History     Not on file   Tobacco Use    Smoking status: Never    Smokeless tobacco: Never   Vaping Use    Vaping Use: Never used   Substance and Sexual Activity    Alcohol use: Yes     Alcohol/week: 0.6 oz     Types: 1 Shots of liquor per week     Comment: once a month    Drug use: Not Currently    Sexual activity: Not on file   Other Topics Concern    Not on file   Social History Narrative    Not on file     Social Determinants of Health     Financial Resource Strain: Not on file   Food Insecurity: Not on file   Transportation Needs: Not on file   Physical Activity: Not on file   Stress: Not on file   Social Connections: Not on file   Intimate Partner Violence: Not on file   Housing Stability: Not on file       REVIEW OF SYSTEMS:  Review of Systems   Constitutional:  Negative for chills, diaphoresis, fever and weight loss.   HENT:  Negative for congestion, ear pain, hearing loss, nosebleeds, sore throat and tinnitus.    Eyes:  Negative for blurred vision, double vision, pain and discharge.   Respiratory:  Negative for cough, hemoptysis, sputum production, wheezing and stridor.    Cardiovascular:  Positive for chest pain. Negative for palpitations, orthopnea and leg swelling.   Gastrointestinal:  Negative for abdominal pain, constipation, heartburn, melena, nausea and vomiting.   Genitourinary:  Negative for dysuria, flank pain and hematuria.   Musculoskeletal:  Negative for joint pain, myalgias and neck pain.   Skin:  Negative for itching and rash.   Neurological:  Negative for dizziness, speech change, focal weakness, seizures, weakness and headaches.   Endo/Heme/Allergies:  Negative for environmental allergies and polydipsia. Does not bruise/bleed easily.   Psychiatric/Behavioral:  Negative for depression, hallucinations, substance abuse and suicidal ideas.          PHYSICAL EXAMINATION:    BP (!) 142/80 (BP Location: Left arm, Patient Position: Sitting, BP Cuff Size: Adult)   Pulse 78   Temp 37.2 °C (99 °F) (Temporal)    "Ht 1.778 m (5' 10\")   Wt 93.4 kg (206 lb)   SpO2 96%   BMI 29.56 kg/m²    Physical Exam  Constitutional:       General: He is not in acute distress.  HENT:      Head: Normocephalic and atraumatic.      Nose: Nose normal.   Eyes:      Conjunctiva/sclera: Conjunctivae normal.      Pupils: Pupils are equal, round, and reactive to light.   Neck:      Vascular: No JVD.      Trachea: No tracheal deviation.   Cardiovascular:      Rate and Rhythm: Normal rate and regular rhythm.      Heart sounds: No murmur heard.  Pulmonary:      Effort: Pulmonary effort is normal. No respiratory distress.      Breath sounds: Normal breath sounds. No stridor.   Abdominal:      General: Bowel sounds are normal. There is no distension.      Palpations: Abdomen is soft.      Tenderness: There is no abdominal tenderness.   Musculoskeletal:         General: No tenderness. Normal range of motion.      Cervical back: Normal range of motion and neck supple.   Skin:     General: Skin is warm and dry.   Neurological:      Mental Status: He is alert and oriented to person, place, and time.      Coordination: Coordination normal.      Gait: Gait is intact.   Psychiatric:         Mood and Affect: Mood and affect normal.         Cognition and Memory: Memory normal.           LABS REVIEWED:  Lab Results   Component Value Date/Time    SODIUM 136 12/20/2023 10:03 AM    POTASSIUM 4.1 12/20/2023 10:03 AM    CHLORIDE 98 12/20/2023 10:03 AM    CO2 28 12/20/2023 10:03 AM    GLUCOSE 96 12/20/2023 10:03 AM    BUN 8 12/20/2023 10:03 AM    CREATININE 0.73 12/20/2023 10:03 AM      Lab Results   Component Value Date/Time    PROTHROMBTM 14.1 12/20/2023 10:03 AM    INR 1.07 12/20/2023 10:03 AM      Lab Results   Component Value Date/Time    WBC 7.7 12/20/2023 10:03 AM    RBC 5.10 12/20/2023 10:03 AM    HEMOGLOBIN 15.1 12/20/2023 10:03 AM    HEMATOCRIT 44.0 12/20/2023 10:03 AM    MCV 86.3 12/20/2023 10:03 AM    MCH 29.6 12/20/2023 10:03 AM    MCHC 34.3 12/20/2023 " 10:03 AM    MPV 8.5 (L) 12/20/2023 10:03 AM    NEUTSPOLYS 67.10 02/17/2023 06:52 AM    LYMPHOCYTES 22.70 02/17/2023 06:52 AM    MONOCYTES 7.20 02/17/2023 06:52 AM    EOSINOPHILS 1.50 02/17/2023 06:52 AM    BASOPHILS 0.60 02/17/2023 06:52 AM        IMAGING REVIEWED AND INTERPRETED:    ECHOCARDIOGRAM   11/1/23 [facility]  CONCLUSIONS  Normal left ventricular systolic function. The ejection fraction is   measured to be 61 % by Reed's biplane.  Normal right ventricular size and systolic function.  Aortic sclerosis without stenosis. Mild aortic insufficiency.  Unable to estimate right ventricular systolic pressure due to an   inadequate tricuspid regurgitant jet.  Mild dilatation of ascending aorta 4.3cm.  No prior study is available for comparison.    CARDIAC CATHETERIZATION   12/21/23 [facility]  IMPRESSION:  Severe three-vessel CAD (mid RCA , mid cx-OM and prox/mid LAD IFR 0.75)  Mildly elevated resting LVEDP 18 mmHg with no significant transaortic gradient on pullback      IMPRESSION:  Accelerating angina, multivessel coronary artery disease, history of peptic ulcer disease, hypertension, hyperlipidemia, obesity      PLAN:    I recommend CABG x 3 with Endo vein harvest.  The procedure, its risks, benefits, potential complications and alternative treatments were discussed with the patient in detail including the risks should he decide not to undergo my recommended treatment. All of his questions were answered to his satisfaction and he is willing to proceed with the operation. The risks include death, stroke, infection: to include a rare bacterial infection related to the use of the heart/lung machine, eliza-operative myocardial infarction, dysrhythmias, diaphragmatic paralysis, chest wall paresthesia, tracheostomy, kidney or other organ failure, possible return to the operating room for bleeding, bleeding requiring transfusion with its attendant risks including AIDS or hepatitis, dehiscence of surgical incisions, respiratory  complications including the need for prolonged ventilator support, Protamine or other drug reaction, peripheral neuropathy, loss of limb, and miscount of surgical items. The operative mortality risk is approximately 1%. The STS mortality risk score is 0.7% and the morbidity and mortality risk score is 3.7%. The scores were discussed with patient.    Findings and recommendations have been discussed with the patient’s cardiologist, Eduard Garrison MD.  Thank you for this very challenging consultation and participation in the patient’s care.  I will keep you apprised of all future developments.      The operation is scheduled for January 18 at 0730 AM at [facility].         Sincerely,       Arnaldo Bacon DO.

## 2023-12-28 NOTE — PROGRESS NOTES
Problem: Prehabilitation    Goal: optimize identified modifiable risk factors prior to cardiac surgery.     Intervention: Screening and interventions for the following  risk factors with educational materials provided if indicated  and patient demonstrates readiness to participate.  Dentition, malnutrition, CAD and dietary cholesterol,  obesity, alcohol and tobacco abuse, illegal drug use,  recent eye surgery/procedures, A1C > 7.5 for pharmacotherapy referral,  and social support system for post discharge planning.    Additionally, home exercise regimen initiation or continuation  (if appropriate), and teaching of and participation of  inspiratory muscle training via incentive spirometer (provided).    Review of post-surgical physical limitations, upcoming  appointments & testing, ordered diagnostics, and medication review  to identify and educate on anticoagulant and antihypertensives  that need cessation in the days prior to surgery.    The cardiac surgery prehabilitation sheet, surgery instruction sheet,  MAP, education booklet, vitals logbook, normals after heart surgery,  and cardiac rehab flier was provided for patient to read and review.    Surgical CHG wipes were also provided with instructions on use.    DENTITION:  Routine dental appointment prior to surgery is  not indicated for CABG procedure.    he was not encouraged to get a dental   cleaning as he does get regular  dental cleaning and denies current dental   infection or issues that should be  addressed prior to surgery.    INCENTIVE SPIROMETRY:  Discussed importance of incentive spirometry (IS) use,  20 times a day AT MINIMUM but more often if possible to  optimize cardio-pulmonary function prior to surgery.  They were instructed to allow a 5 minute rest in  between uses to achieve max IS volume.  Education with return demonstration performed.   Patient is effective, coaching was not needed.    Prehabilitation IS baseline is 3500.    HOME EXERCISE  REGIMEN:  We discussed importance of preventing deconditioning and  muscle wasting in the time preceding surgery as this will occur  post surgery.    > 50 % left main disease present? NO  EF-- 60%  Active sub lingual nitro use? NO  he is appropriate for initiation  of a home exercise regimen.    he is moderately physically active; current  exercise tolerance/level is high due to some  symptoms of SOB with moderate activity.    he  was educated to start an   exercise regimen of walking on   a flat surface 30 minutes a day, adjusting the  length for tolerance level.    He was educated  that if chest pain or SOB occurs patient is to stop  immediately and if symptoms do not  resolve after stopping to call 911.    he was also encouraged to practice sit to stand  from a chair with one arm to assist or no arm assistance  12 times a day to strengthen quadriceps and improve balance.    CAD:  Patient does have known CAD; education on  cholesterol, diet, and the heart are indicated  and were provided.    OBESITY:  Patient's BMI is not over 30.  Education regarding portion  Sizing and diet are not indicated and were not provided. He is also trying to lose weight and is down 10 lbs.    MALNUTRITION:  Malnutrition screening tool (MST) shows he is  not at risk with a score of 0.  (0-1 not at risk; greater or equal to 2 is at risk).    Given MST score, functional capacity,   and no reported muscle loss, it was not  recommended they increase their protein  intake to 1.2 to 2.5 gram/kg/day    SMOKING:  Patient denies current smoking history.  Smoking risks  and cessation education not indicated and was not provided.    ALCOHOL ABUSE:  Patient denies alcohol abuse.  Alcohol risks and cessation  education not indicated and was not provided.    ILLEGAL DRUG USE:  Patient denies illicit drug use.  Illicit drug use risks  and cessation education not indicated and was not provided    SOCIAL SUPPORT SYSTEM FOR DISCHARGE NEEDS:    Patient  "does not have family to stay for  48 hours post discharge to assist with ADL's. Barriers  to hospital discharge anticipated.  He did say \"don't worry I figure out something for someone to stay with me.    PSYCHOLOGICAL PREPARATION:  We discussed the basics of physical limitation post op to include:               No driving for 4 weeks               No lifting, pushing or pulling > 10 lbs for 6 weeks  Sternal precautions to include moving  Within the tube and safe mobility in and  out of bed and the chair.    ANTIBIOTIC STEWARDSHIP:  MRSA swab will be collected by [facility] during pre-admit testing.    MEDICATION AN UPCOMING APPOINTMENTS REVIEW:  Current medications were reviewed that may need  specific stop dates prior to surgery. The patient was instructed   to stop the following medications after the indicated date.    No meds to stop    Vascular scheduling sheet was provided and explained.    Walk test Times: 2 3 2    A phone appointment for pre op education was made  for 1/15 at 1130 to review all provided education materials.    "

## 2024-01-02 ENCOUNTER — APPOINTMENT (OUTPATIENT)
Dept: ADMISSIONS | Facility: MEDICAL CENTER | Age: 69
DRG: 236 | End: 2024-01-02
Attending: THORACIC SURGERY (CARDIOTHORACIC VASCULAR SURGERY)
Payer: MEDICARE

## 2024-01-04 ENCOUNTER — TELEPHONE (OUTPATIENT)
Dept: CARDIOLOGY | Facility: MEDICAL CENTER | Age: 69
End: 2024-01-04
Payer: MEDICARE

## 2024-01-04 ENCOUNTER — HOSPITAL ENCOUNTER (OUTPATIENT)
Dept: RADIOLOGY | Facility: MEDICAL CENTER | Age: 69
End: 2024-01-04
Attending: THORACIC SURGERY (CARDIOTHORACIC VASCULAR SURGERY)
Payer: MEDICARE

## 2024-01-04 DIAGNOSIS — I25.10 CORONARY ARTERY DISEASE DUE TO CALCIFIED CORONARY LESION: ICD-10-CM

## 2024-01-04 DIAGNOSIS — I25.84 CORONARY ARTERY DISEASE DUE TO CALCIFIED CORONARY LESION: ICD-10-CM

## 2024-01-04 PROCEDURE — 93880 EXTRACRANIAL BILAT STUDY: CPT

## 2024-01-04 PROCEDURE — 93970 EXTREMITY STUDY: CPT

## 2024-01-04 PROCEDURE — 93970 EXTREMITY STUDY: CPT | Mod: 26 | Performed by: INTERNAL MEDICINE

## 2024-01-04 PROCEDURE — 93880 EXTRACRANIAL BILAT STUDY: CPT | Mod: 26 | Performed by: INTERNAL MEDICINE

## 2024-01-04 NOTE — TELEPHONE ENCOUNTER
Mariza Eldridge M.D.  You4 minutes ago (3:25 PM)     *drive     Mariza Eldridge M.D.  You4 minutes ago (3:25 PM)     If he can't ride, we can do video visit.  The yearly test is echocardiogram to monitor ascending aorta. Thank you!   ----------------------------------------------------------    LVM asking pt to call back to discuss.

## 2024-01-04 NOTE — TELEPHONE ENCOUNTER
QUESTION  Received: Today  Stacey Elizondo R.N.  Good afternoon,    The patient walked in to reschedule their appt with  in Feb because he is getting a tripple by pass and will not be able to drive to his next appt, he asked to be rescheduled to late this year which I did. He also said  wanted him to do some type of testing once a year. I could not find what he was talking about in her notes from the last visit. Is there any way we could find that out and give him a call back? His call back number is good.    Thank you!  Stacey  ------------------------------------------------------------    I do not see mention of yearly testing in notes.    To , can you please advise if you want any yearly testing completed for pt?

## 2024-01-04 NOTE — TELEPHONE ENCOUNTER
patient walked in to cancel appt because he is getting a tripple by pass and will not be able to drive to his next appt, he asked to be rescheduled to late this year

## 2024-01-09 ENCOUNTER — PRE-ADMISSION TESTING (OUTPATIENT)
Dept: ADMISSIONS | Facility: MEDICAL CENTER | Age: 69
DRG: 236 | End: 2024-01-09
Attending: THORACIC SURGERY (CARDIOTHORACIC VASCULAR SURGERY)
Payer: MEDICARE

## 2024-01-09 RX ORDER — ATORVASTATIN CALCIUM 20 MG/1
20 TABLET, FILM COATED ORAL NIGHTLY
Status: ON HOLD | COMMUNITY
End: 2024-01-23

## 2024-01-15 ENCOUNTER — HOSPITAL ENCOUNTER (OUTPATIENT)
Dept: RADIOLOGY | Facility: MEDICAL CENTER | Age: 69
DRG: 236 | End: 2024-01-15
Attending: THORACIC SURGERY (CARDIOTHORACIC VASCULAR SURGERY)
Payer: MEDICARE

## 2024-01-15 ENCOUNTER — TELEPHONE (OUTPATIENT)
Dept: CARDIOTHORACIC SURGERY | Facility: MEDICAL CENTER | Age: 69
End: 2024-01-15

## 2024-01-15 ENCOUNTER — PRE-ADMISSION TESTING (OUTPATIENT)
Dept: ADMISSIONS | Facility: MEDICAL CENTER | Age: 69
DRG: 236 | End: 2024-01-15
Attending: THORACIC SURGERY (CARDIOTHORACIC VASCULAR SURGERY)
Payer: MEDICARE

## 2024-01-15 DIAGNOSIS — Z01.810 PRE-OPERATIVE CARDIOVASCULAR EXAMINATION: ICD-10-CM

## 2024-01-15 DIAGNOSIS — Z01.812 PRE-OPERATIVE LABORATORY EXAMINATION: ICD-10-CM

## 2024-01-15 DIAGNOSIS — Z01.811 PRE-OPERATIVE RESPIRATORY EXAMINATION: ICD-10-CM

## 2024-01-15 LAB
ABO GROUP BLD: NORMAL
ALBUMIN SERPL BCP-MCNC: 4.7 G/DL (ref 3.2–4.9)
ALBUMIN/GLOB SERPL: 1.3 G/DL
ALP SERPL-CCNC: 91 U/L (ref 30–99)
ALT SERPL-CCNC: 32 U/L (ref 2–50)
AMPHET UR QL SCN: NEGATIVE
ANION GAP SERPL CALC-SCNC: 13 MMOL/L (ref 7–16)
APPEARANCE UR: CLEAR
APTT PPP: 28.7 SEC (ref 24.7–36)
AST SERPL-CCNC: 28 U/L (ref 12–45)
BARBITURATES UR QL SCN: NEGATIVE
BASOPHILS # BLD AUTO: 0.7 % (ref 0–1.8)
BASOPHILS # BLD: 0.05 K/UL (ref 0–0.12)
BENZODIAZ UR QL SCN: NEGATIVE
BILIRUB SERPL-MCNC: 0.4 MG/DL (ref 0.1–1.5)
BILIRUB UR QL STRIP.AUTO: NEGATIVE
BLD GP AB SCN SERPL QL: NORMAL
BUN SERPL-MCNC: 12 MG/DL (ref 8–22)
BZE UR QL SCN: NEGATIVE
CALCIUM ALBUM COR SERPL-MCNC: 8.6 MG/DL (ref 8.5–10.5)
CALCIUM SERPL-MCNC: 9.2 MG/DL (ref 8.5–10.5)
CANNABINOIDS UR QL SCN: NEGATIVE
CHLORIDE SERPL-SCNC: 101 MMOL/L (ref 96–112)
CO2 SERPL-SCNC: 22 MMOL/L (ref 20–33)
COLOR UR: YELLOW
CREAT SERPL-MCNC: 0.74 MG/DL (ref 0.5–1.4)
EKG IMPRESSION: NORMAL
EOSINOPHIL # BLD AUTO: 0.12 K/UL (ref 0–0.51)
EOSINOPHIL NFR BLD: 1.7 % (ref 0–6.9)
ERYTHROCYTE [DISTWIDTH] IN BLOOD BY AUTOMATED COUNT: 42 FL (ref 35.9–50)
EST. AVERAGE GLUCOSE BLD GHB EST-MCNC: 126 MG/DL
FENTANYL UR QL: NEGATIVE
GFR SERPLBLD CREATININE-BSD FMLA CKD-EPI: 99 ML/MIN/1.73 M 2
GLOBULIN SER CALC-MCNC: 3.5 G/DL (ref 1.9–3.5)
GLUCOSE SERPL-MCNC: 107 MG/DL (ref 65–99)
GLUCOSE UR STRIP.AUTO-MCNC: NEGATIVE MG/DL
HBA1C MFR BLD: 6 % (ref 4–5.6)
HCT VFR BLD AUTO: 48 % (ref 42–52)
HGB BLD-MCNC: 16.4 G/DL (ref 14–18)
IMM GRANULOCYTES # BLD AUTO: 0.03 K/UL (ref 0–0.11)
IMM GRANULOCYTES NFR BLD AUTO: 0.4 % (ref 0–0.9)
INR PPP: 1.02 (ref 0.87–1.13)
KETONES UR STRIP.AUTO-MCNC: NEGATIVE MG/DL
LEUKOCYTE ESTERASE UR QL STRIP.AUTO: NEGATIVE
LYMPHOCYTES # BLD AUTO: 1.62 K/UL (ref 1–4.8)
LYMPHOCYTES NFR BLD: 23 % (ref 22–41)
MCH RBC QN AUTO: 29.7 PG (ref 27–33)
MCHC RBC AUTO-ENTMCNC: 34.2 G/DL (ref 32.3–36.5)
MCV RBC AUTO: 86.8 FL (ref 81.4–97.8)
METHADONE UR QL SCN: NEGATIVE
MICRO URNS: NORMAL
MONOCYTES # BLD AUTO: 0.41 K/UL (ref 0–0.85)
MONOCYTES NFR BLD AUTO: 5.8 % (ref 0–13.4)
NEUTROPHILS # BLD AUTO: 4.81 K/UL (ref 1.82–7.42)
NEUTROPHILS NFR BLD: 68.4 % (ref 44–72)
NITRITE UR QL STRIP.AUTO: NEGATIVE
NRBC # BLD AUTO: 0 K/UL
NRBC BLD-RTO: 0 /100 WBC (ref 0–0.2)
OPIATES UR QL SCN: NEGATIVE
OXYCODONE UR QL SCN: NEGATIVE
PCP UR QL SCN: NEGATIVE
PH UR STRIP.AUTO: 5.5 [PH] (ref 5–8)
PLATELET # BLD AUTO: 288 K/UL (ref 164–446)
PMV BLD AUTO: 8.7 FL (ref 9–12.9)
POTASSIUM SERPL-SCNC: 4.2 MMOL/L (ref 3.6–5.5)
PROPOXYPH UR QL SCN: NEGATIVE
PROT SERPL-MCNC: 8.2 G/DL (ref 6–8.2)
PROT UR QL STRIP: NEGATIVE MG/DL
PROTHROMBIN TIME: 13.5 SEC (ref 12–14.6)
RBC # BLD AUTO: 5.53 M/UL (ref 4.7–6.1)
RBC UR QL AUTO: NEGATIVE
RH BLD: NORMAL
SCCMEC + MECA PNL NOSE NAA+PROBE: NEGATIVE
SCCMEC + MECA PNL NOSE NAA+PROBE: POSITIVE
SODIUM SERPL-SCNC: 136 MMOL/L (ref 135–145)
SP GR UR STRIP.AUTO: 1.02
UROBILINOGEN UR STRIP.AUTO-MCNC: 0.2 MG/DL
WBC # BLD AUTO: 7 K/UL (ref 4.8–10.8)

## 2024-01-15 PROCEDURE — 80053 COMPREHEN METABOLIC PANEL: CPT

## 2024-01-15 PROCEDURE — 86850 RBC ANTIBODY SCREEN: CPT

## 2024-01-15 PROCEDURE — 80307 DRUG TEST PRSMV CHEM ANLYZR: CPT

## 2024-01-15 PROCEDURE — 71046 X-RAY EXAM CHEST 2 VIEWS: CPT

## 2024-01-15 PROCEDURE — 81003 URINALYSIS AUTO W/O SCOPE: CPT

## 2024-01-15 PROCEDURE — 93005 ELECTROCARDIOGRAM TRACING: CPT

## 2024-01-15 PROCEDURE — 85025 COMPLETE CBC W/AUTO DIFF WBC: CPT

## 2024-01-15 PROCEDURE — 36415 COLL VENOUS BLD VENIPUNCTURE: CPT

## 2024-01-15 PROCEDURE — 86900 BLOOD TYPING SEROLOGIC ABO: CPT

## 2024-01-15 PROCEDURE — 87641 MR-STAPH DNA AMP PROBE: CPT

## 2024-01-15 PROCEDURE — 83036 HEMOGLOBIN GLYCOSYLATED A1C: CPT

## 2024-01-15 PROCEDURE — 93010 ELECTROCARDIOGRAM REPORT: CPT | Performed by: INTERNAL MEDICINE

## 2024-01-15 PROCEDURE — 85610 PROTHROMBIN TIME: CPT

## 2024-01-15 PROCEDURE — 85730 THROMBOPLASTIN TIME PARTIAL: CPT

## 2024-01-15 PROCEDURE — 87640 STAPH A DNA AMP PROBE: CPT

## 2024-01-15 PROCEDURE — 86901 BLOOD TYPING SEROLOGIC RH(D): CPT

## 2024-01-15 NOTE — TELEPHONE ENCOUNTER
Patient was reminded that CABG X 3-4 surgery with  Dr. Bacon is on 11/18 at 0730 in the morning. he  are aware check in is at 0515 am.    PAT date and time was reviewed with patient and  verified it is within 72 hours of surgery, 1/15 at 1305 am    Vascular studies and procedures: carotids and vein mapping  were scheduled, completed,  and reviewed by myself for concerning  results did not need escalation to the LIBERTAD/MD.    he did not need a dental check/work.    Baseline IS was 3500. he has been compliant   with the IS. Volume has not improved past 3000.    he was prescribed a walking regimen or  told to continue he current regimen and  He has been compliant.  He has been practicing sit to stand.    he had no meds to stop    He was told that no medication (s) are okay to  take the morning of surgery prior to check in.    The CHG wipes instructions were reviewed and understood.    he was reminded no food after midnight.    Call time 19 minutes

## 2024-01-17 RX ORDER — NOREPINEPHRINE BITARTRATE 0.03 MG/ML
0-1 INJECTION, SOLUTION INTRAVENOUS CONTINUOUS
Status: DISCONTINUED | OUTPATIENT
Start: 2024-01-18 | End: 2024-01-18

## 2024-01-17 RX ORDER — DEXMEDETOMIDINE HYDROCHLORIDE 4 UG/ML
0-1.5 INJECTION, SOLUTION INTRAVENOUS CONTINUOUS
Status: DISCONTINUED | OUTPATIENT
Start: 2024-01-18 | End: 2024-01-18

## 2024-01-17 RX ORDER — EPINEPHRINE HCL IN 0.9 % NACL 4MG/250ML
0-.5 PLASTIC BAG, INJECTION (ML) INTRAVENOUS CONTINUOUS
Status: DISCONTINUED | OUTPATIENT
Start: 2024-01-18 | End: 2024-01-18

## 2024-01-18 ENCOUNTER — ANESTHESIA EVENT (OUTPATIENT)
Dept: SURGERY | Facility: MEDICAL CENTER | Age: 69
DRG: 236 | End: 2024-01-18
Payer: MEDICARE

## 2024-01-18 ENCOUNTER — APPOINTMENT (OUTPATIENT)
Dept: CARDIOLOGY | Facility: MEDICAL CENTER | Age: 69
DRG: 236 | End: 2024-01-18
Attending: THORACIC SURGERY (CARDIOTHORACIC VASCULAR SURGERY)
Payer: MEDICARE

## 2024-01-18 ENCOUNTER — APPOINTMENT (OUTPATIENT)
Dept: RADIOLOGY | Facility: MEDICAL CENTER | Age: 69
DRG: 236 | End: 2024-01-18
Attending: THORACIC SURGERY (CARDIOTHORACIC VASCULAR SURGERY)
Payer: MEDICARE

## 2024-01-18 ENCOUNTER — APPOINTMENT (OUTPATIENT)
Dept: RADIOLOGY | Facility: MEDICAL CENTER | Age: 69
DRG: 236 | End: 2024-01-18
Attending: NURSE PRACTITIONER
Payer: MEDICARE

## 2024-01-18 ENCOUNTER — ANESTHESIA (OUTPATIENT)
Dept: SURGERY | Facility: MEDICAL CENTER | Age: 69
DRG: 236 | End: 2024-01-18
Payer: MEDICARE

## 2024-01-18 ENCOUNTER — HOSPITAL ENCOUNTER (INPATIENT)
Facility: MEDICAL CENTER | Age: 69
LOS: 5 days | DRG: 236 | End: 2024-01-23
Attending: THORACIC SURGERY (CARDIOTHORACIC VASCULAR SURGERY) | Admitting: THORACIC SURGERY (CARDIOTHORACIC VASCULAR SURGERY)
Payer: MEDICARE

## 2024-01-18 DIAGNOSIS — Z95.1 S/P CABG X 3: ICD-10-CM

## 2024-01-18 PROBLEM — D62 ACUTE BLOOD LOSS AS CAUSE OF POSTOPERATIVE ANEMIA: Status: ACTIVE | Noted: 2024-01-18

## 2024-01-18 PROBLEM — Z99.11 ENCOUNTER FOR WEANING FROM VENTILATOR (HCC): Status: ACTIVE | Noted: 2024-01-18

## 2024-01-18 PROBLEM — I25.119 ATHEROSCLEROSIS OF NATIVE CORONARY ARTERY OF NATIVE HEART WITH ANGINA PECTORIS (HCC): Status: ACTIVE | Noted: 2024-01-18

## 2024-01-18 LAB
ABO + RH BLD: NORMAL
ACT BLD: 136 SEC (ref 74–137)
ACT BLD: 163 SEC (ref 74–137)
ACT BLD: 634 SEC (ref 74–137)
ACT BLD: 639 SEC (ref 74–137)
ACT BLD: 672 SEC (ref 74–137)
ACT BLD: 736 SEC (ref 74–137)
BASE EXCESS BLDA CALC-SCNC: -10 MMOL/L (ref -4–3)
BASE EXCESS BLDA CALC-SCNC: -3 MMOL/L (ref -4–3)
BASE EXCESS BLDA CALC-SCNC: -4 MMOL/L (ref -4–3)
BASE EXCESS BLDA CALC-SCNC: -5 MMOL/L (ref -4–3)
BASE EXCESS BLDA CALC-SCNC: -7 MMOL/L (ref -4–3)
BASE EXCESS BLDA CALC-SCNC: -9 MMOL/L (ref -4–3)
BASE EXCESS BLDA CALC-SCNC: 0 MMOL/L (ref -4–3)
BASE EXCESS BLDA CALC-SCNC: 2 MMOL/L (ref -4–3)
BASE EXCESS BLDV CALC-SCNC: -3 MMOL/L (ref -4–3)
BODY TEMPERATURE: ABNORMAL DEGREES
CA-I BLD ISE-SCNC: 0.51 MMOL/L (ref 1.1–1.3)
CA-I BLD ISE-SCNC: 1.01 MMOL/L (ref 1.1–1.3)
CA-I BLD ISE-SCNC: 1.02 MMOL/L (ref 1.1–1.3)
CA-I BLD ISE-SCNC: 1.05 MMOL/L (ref 1.1–1.3)
CA-I BLD ISE-SCNC: 1.12 MMOL/L (ref 1.1–1.3)
CA-I BLD ISE-SCNC: 1.13 MMOL/L (ref 1.1–1.3)
CA-I BLD ISE-SCNC: 1.17 MMOL/L (ref 1.1–1.3)
CA-I BLD ISE-SCNC: 1.21 MMOL/L (ref 1.1–1.3)
CO2 BLDA-SCNC: 16 MMOL/L (ref 20–33)
CO2 BLDA-SCNC: 18 MMOL/L (ref 20–33)
CO2 BLDA-SCNC: 21 MMOL/L (ref 20–33)
CO2 BLDA-SCNC: 21 MMOL/L (ref 20–33)
CO2 BLDA-SCNC: 22 MMOL/L (ref 20–33)
CO2 BLDA-SCNC: 22 MMOL/L (ref 20–33)
CO2 BLDA-SCNC: 23 MMOL/L (ref 20–33)
CO2 BLDA-SCNC: 23 MMOL/L (ref 20–33)
CO2 BLDA-SCNC: 24 MMOL/L (ref 20–33)
CO2 BLDA-SCNC: 27 MMOL/L (ref 20–33)
CO2 BLDA-SCNC: 28 MMOL/L (ref 20–33)
CO2 BLDV-SCNC: 26 MMOL/L (ref 20–33)
DELSYS IDSYS: ABNORMAL
EKG IMPRESSION: NORMAL
END TIDAL CARBON DIOXIDE IECO2: 31 MMHG
END TIDAL CARBON DIOXIDE IECO2: 31 MMHG
END TIDAL CARBON DIOXIDE IECO2: 40 MMHG
END TIDAL CARBON DIOXIDE IECO2: 40 MMHG
END TIDAL CARBON DIOXIDE IECO2: 41 MMHG
END TIDAL CARBON DIOXIDE IECO2: 43 MMHG
HCO3 BLDA-SCNC: 15.5 MMOL/L (ref 17–25)
HCO3 BLDA-SCNC: 17.1 MMOL/L (ref 17–25)
HCO3 BLDA-SCNC: 19.6 MMOL/L (ref 17–25)
HCO3 BLDA-SCNC: 20.1 MMOL/L (ref 17–25)
HCO3 BLDA-SCNC: 21 MMOL/L (ref 17–25)
HCO3 BLDA-SCNC: 21.1 MMOL/L (ref 17–25)
HCO3 BLDA-SCNC: 21.3 MMOL/L (ref 17–25)
HCO3 BLDA-SCNC: 22 MMOL/L (ref 17–25)
HCO3 BLDA-SCNC: 22.6 MMOL/L (ref 17–25)
HCO3 BLDA-SCNC: 26 MMOL/L (ref 17–25)
HCO3 BLDA-SCNC: 26.8 MMOL/L (ref 17–25)
HCO3 BLDV-SCNC: 24.7 MMOL/L (ref 24–28)
HCT VFR BLD AUTO: 40.7 % (ref 42–52)
HCT VFR BLD CALC: 32 % (ref 42–52)
HCT VFR BLD CALC: 33 % (ref 42–52)
HCT VFR BLD CALC: 33 % (ref 42–52)
HCT VFR BLD CALC: 36 % (ref 42–52)
HCT VFR BLD CALC: 37 % (ref 42–52)
HCT VFR BLD CALC: 43 % (ref 42–52)
HGB BLD-MCNC: 10.9 G/DL (ref 14–18)
HGB BLD-MCNC: 11.2 G/DL (ref 14–18)
HGB BLD-MCNC: 11.2 G/DL (ref 14–18)
HGB BLD-MCNC: 12.2 G/DL (ref 14–18)
HGB BLD-MCNC: 12.6 G/DL (ref 14–18)
HGB BLD-MCNC: 14.1 G/DL (ref 14–18)
HGB BLD-MCNC: 14.6 G/DL (ref 14–18)
HOROWITZ INDEX BLDA+IHG-RTO: 127 MM[HG]
HOROWITZ INDEX BLDA+IHG-RTO: 150 MM[HG]
HOROWITZ INDEX BLDA+IHG-RTO: 267 MM[HG]
HOROWITZ INDEX BLDA+IHG-RTO: 297 MM[HG]
HOROWITZ INDEX BLDA+IHG-RTO: 83 MM[HG]
HOROWITZ INDEX BLDA+IHG-RTO: 89 MM[HG]
MAGNESIUM SERPL-MCNC: 1.9 MG/DL (ref 1.5–2.5)
MODE IMODE: ABNORMAL
O2/TOTAL GAS SETTING VFR VENT: 100 %
O2/TOTAL GAS SETTING VFR VENT: 30 %
O2/TOTAL GAS SETTING VFR VENT: 30 %
O2/TOTAL GAS SETTING VFR VENT: 50 %
O2/TOTAL GAS SETTING VFR VENT: 60 %
O2/TOTAL GAS SETTING VFR VENT: 80 %
PATHOLOGY CONSULT NOTE: NORMAL
PCO2 BLDA: 31.5 MMHG (ref 26–37)
PCO2 BLDA: 38.1 MMHG (ref 26–37)
PCO2 BLDA: 38.2 MMHG (ref 26–37)
PCO2 BLDA: 39.8 MMHG (ref 26–37)
PCO2 BLDA: 42.2 MMHG (ref 26–37)
PCO2 BLDA: 42.2 MMHG (ref 26–37)
PCO2 BLDA: 42.6 MMHG (ref 26–37)
PCO2 BLDA: 42.6 MMHG (ref 26–37)
PCO2 BLDA: 42.8 MMHG (ref 26–37)
PCO2 BLDA: 44.5 MMHG (ref 26–37)
PCO2 BLDA: 46.3 MMHG (ref 26–37)
PCO2 BLDV: 52.1 MMHG (ref 41–51)
PCO2 TEMP ADJ BLDA: 30.2 MMHG (ref 26–37)
PCO2 TEMP ADJ BLDA: 35 MMHG (ref 26–37)
PCO2 TEMP ADJ BLDA: 36.4 MMHG (ref 26–37)
PCO2 TEMP ADJ BLDA: 38.1 MMHG (ref 26–37)
PCO2 TEMP ADJ BLDA: 40.3 MMHG (ref 26–37)
PCO2 TEMP ADJ BLDA: 40.9 MMHG (ref 26–37)
PCO2 TEMP ADJ BLDA: 42 MMHG (ref 26–37)
PCO2 TEMP ADJ BLDA: 42.2 MMHG (ref 26–37)
PCO2 TEMP ADJ BLDA: 42.4 MMHG (ref 26–37)
PCO2 TEMP ADJ BLDA: 42.6 MMHG (ref 26–37)
PCO2 TEMP ADJ BLDA: 42.8 MMHG (ref 26–37)
PCO2 TEMP ADJ BLDV: 47.7 MMHG (ref 41–51)
PEEP END EXPIRATORY PRESSURE IPEEP: 5 CMH20
PEEP END EXPIRATORY PRESSURE IPEEP: 8 CMH20
PERCENT MINUTE VOLUME IPMV: 100
PERCENT MINUTE VOLUME IPMV: 100
PERCENT MINUTE VOLUME IPMV: 130
PERCENT MINUTE VOLUME IPMV: 130
PERCENT MINUTE VOLUME IPMV: 160
PERCENT MINUTE VOLUME IPMV: 160
PH BLDA: 7.26 [PH] (ref 7.4–7.5)
PH BLDA: 7.28 [PH] (ref 7.4–7.5)
PH BLDA: 7.29 [PH] (ref 7.4–7.5)
PH BLDA: 7.3 [PH] (ref 7.4–7.5)
PH BLDA: 7.32 [PH] (ref 7.4–7.5)
PH BLDA: 7.33 [PH] (ref 7.4–7.5)
PH BLDA: 7.36 [PH] (ref 7.4–7.5)
PH BLDA: 7.36 [PH] (ref 7.4–7.5)
PH BLDA: 7.41 [PH] (ref 7.4–7.5)
PH BLDV: 7.28 [PH] (ref 7.31–7.45)
PH TEMP ADJ BLDA: 7.26 [PH] (ref 7.4–7.5)
PH TEMP ADJ BLDA: 7.29 [PH] (ref 7.4–7.5)
PH TEMP ADJ BLDA: 7.3 [PH] (ref 7.4–7.5)
PH TEMP ADJ BLDA: 7.3 [PH] (ref 7.4–7.5)
PH TEMP ADJ BLDA: 7.32 [PH] (ref 7.4–7.5)
PH TEMP ADJ BLDA: 7.36 [PH] (ref 7.4–7.5)
PH TEMP ADJ BLDA: 7.39 [PH] (ref 7.4–7.5)
PH TEMP ADJ BLDA: 7.39 [PH] (ref 7.4–7.5)
PH TEMP ADJ BLDA: 7.41 [PH] (ref 7.4–7.5)
PH TEMP ADJ BLDV: 7.31 [PH] (ref 7.31–7.45)
PLATELET # BLD AUTO: 253 K/UL (ref 164–446)
PO2 BLDA: 168 MMHG (ref 64–87)
PO2 BLDA: 321 MMHG (ref 64–87)
PO2 BLDA: 330 MMHG (ref 64–87)
PO2 BLDA: 332 MMHG (ref 64–87)
PO2 BLDA: 346 MMHG (ref 64–87)
PO2 BLDA: 66 MMHG (ref 64–87)
PO2 BLDA: 75 MMHG (ref 64–87)
PO2 BLDA: 76 MMHG (ref 64–87)
PO2 BLDA: 80 MMHG (ref 64–87)
PO2 BLDA: 89 MMHG (ref 64–87)
PO2 BLDA: 89 MMHG (ref 64–87)
PO2 BLDV: 50 MMHG (ref 25–40)
PO2 TEMP ADJ BLDA: 168 MMHG (ref 64–87)
PO2 TEMP ADJ BLDA: 311 MMHG (ref 64–87)
PO2 TEMP ADJ BLDA: 330 MMHG (ref 64–87)
PO2 TEMP ADJ BLDA: 332 MMHG (ref 64–87)
PO2 TEMP ADJ BLDA: 335 MMHG (ref 64–87)
PO2 TEMP ADJ BLDA: 57 MMHG (ref 64–87)
PO2 TEMP ADJ BLDA: 70 MMHG (ref 64–87)
PO2 TEMP ADJ BLDA: 71 MMHG (ref 64–87)
PO2 TEMP ADJ BLDA: 80 MMHG (ref 64–87)
PO2 TEMP ADJ BLDA: 84 MMHG (ref 64–87)
PO2 TEMP ADJ BLDA: 84 MMHG (ref 64–87)
PO2 TEMP ADJ BLDV: 44 MMHG (ref 25–40)
POTASSIUM BLD-SCNC: 2.9 MMOL/L (ref 3.6–5.5)
POTASSIUM BLD-SCNC: 3.2 MMOL/L (ref 3.6–5.5)
POTASSIUM BLD-SCNC: 4.2 MMOL/L (ref 3.6–5.5)
POTASSIUM BLD-SCNC: 4.3 MMOL/L (ref 3.6–5.5)
POTASSIUM BLD-SCNC: 4.5 MMOL/L (ref 3.6–5.5)
POTASSIUM BLD-SCNC: 5 MMOL/L (ref 3.6–5.5)
POTASSIUM BLD-SCNC: 5.2 MMOL/L (ref 3.6–5.5)
POTASSIUM BLD-SCNC: 5.7 MMOL/L (ref 3.6–5.5)
POTASSIUM SERPL-SCNC: 3.4 MMOL/L (ref 3.6–5.5)
POTASSIUM SERPL-SCNC: 4.4 MMOL/L (ref 3.6–5.5)
POTASSIUM SERPL-SCNC: 4.7 MMOL/L (ref 3.6–5.5)
SAO2 % BLDA: 100 % (ref 93–99)
SAO2 % BLDA: 91 % (ref 93–99)
SAO2 % BLDA: 93 % (ref 93–99)
SAO2 % BLDA: 93 % (ref 93–99)
SAO2 % BLDA: 96 % (ref 93–99)
SAO2 % BLDA: 99 % (ref 93–99)
SAO2 % BLDV: 80 %
SODIUM BLD-SCNC: 135 MMOL/L (ref 135–145)
SODIUM BLD-SCNC: 135 MMOL/L (ref 135–145)
SODIUM BLD-SCNC: 136 MMOL/L (ref 135–145)
SODIUM BLD-SCNC: 138 MMOL/L (ref 135–145)
SODIUM BLD-SCNC: 141 MMOL/L (ref 135–145)
SODIUM BLD-SCNC: 142 MMOL/L (ref 135–145)
SODIUM BLD-SCNC: 142 MMOL/L (ref 135–145)
SODIUM BLD-SCNC: 143 MMOL/L (ref 135–145)
SPECIMEN DRAWN FROM PATIENT: ABNORMAL

## 2024-01-18 PROCEDURE — 94799 UNLISTED PULMONARY SVC/PX: CPT

## 2024-01-18 PROCEDURE — 700111 HCHG RX REV CODE 636 W/ 250 OVERRIDE (IP): Performed by: NURSE PRACTITIONER

## 2024-01-18 PROCEDURE — C1751 CATH, INF, PER/CENT/MIDLINE: HCPCS | Performed by: THORACIC SURGERY (CARDIOTHORACIC VASCULAR SURGERY)

## 2024-01-18 PROCEDURE — 700111 HCHG RX REV CODE 636 W/ 250 OVERRIDE (IP)

## 2024-01-18 PROCEDURE — 160048 HCHG OR STATISTICAL LEVEL 1-5: Performed by: THORACIC SURGERY (CARDIOTHORACIC VASCULAR SURGERY)

## 2024-01-18 PROCEDURE — 83735 ASSAY OF MAGNESIUM: CPT

## 2024-01-18 PROCEDURE — 99291 CRITICAL CARE FIRST HOUR: CPT | Performed by: INTERNAL MEDICINE

## 2024-01-18 PROCEDURE — 88300 SURGICAL PATH GROSS: CPT

## 2024-01-18 PROCEDURE — 84132 ASSAY OF SERUM POTASSIUM: CPT | Mod: 91

## 2024-01-18 PROCEDURE — B24BZZ4 ULTRASONOGRAPHY OF HEART WITH AORTA, TRANSESOPHAGEAL: ICD-10-PCS | Performed by: ANESTHESIOLOGY

## 2024-01-18 PROCEDURE — 94002 VENT MGMT INPAT INIT DAY: CPT

## 2024-01-18 PROCEDURE — A9270 NON-COVERED ITEM OR SERVICE: HCPCS | Performed by: THORACIC SURGERY (CARDIOTHORACIC VASCULAR SURGERY)

## 2024-01-18 PROCEDURE — 700101 HCHG RX REV CODE 250

## 2024-01-18 PROCEDURE — 700101 HCHG RX REV CODE 250: Performed by: NURSE PRACTITIONER

## 2024-01-18 PROCEDURE — C1898 LEAD, PMKR, OTHER THAN TRANS: HCPCS | Performed by: THORACIC SURGERY (CARDIOTHORACIC VASCULAR SURGERY)

## 2024-01-18 PROCEDURE — 71045 X-RAY EXAM CHEST 1 VIEW: CPT

## 2024-01-18 PROCEDURE — 93010 ELECTROCARDIOGRAM REPORT: CPT | Performed by: INTERNAL MEDICINE

## 2024-01-18 PROCEDURE — 33508 ENDOSCOPIC VEIN HARVEST: CPT | Mod: 59 | Performed by: THORACIC SURGERY (CARDIOTHORACIC VASCULAR SURGERY)

## 2024-01-18 PROCEDURE — 06BQ4ZZ EXCISION OF LEFT SAPHENOUS VEIN, PERCUTANEOUS ENDOSCOPIC APPROACH: ICD-10-PCS | Performed by: THORACIC SURGERY (CARDIOTHORACIC VASCULAR SURGERY)

## 2024-01-18 PROCEDURE — 503001 HCHG PERFUSION: Performed by: THORACIC SURGERY (CARDIOTHORACIC VASCULAR SURGERY)

## 2024-01-18 PROCEDURE — 82803 BLOOD GASES ANY COMBINATION: CPT | Mod: 91

## 2024-01-18 PROCEDURE — 33572 OPEN CORONARY ENDARTERECTOMY: CPT | Performed by: THORACIC SURGERY (CARDIOTHORACIC VASCULAR SURGERY)

## 2024-01-18 PROCEDURE — 94003 VENT MGMT INPAT SUBQ DAY: CPT

## 2024-01-18 PROCEDURE — 36415 COLL VENOUS BLD VENIPUNCTURE: CPT

## 2024-01-18 PROCEDURE — 700105 HCHG RX REV CODE 258

## 2024-01-18 PROCEDURE — 33518 CABG ARTERY-VEIN TWO: CPT | Mod: AS | Performed by: NURSE PRACTITIONER

## 2024-01-18 PROCEDURE — 33518 CABG ARTERY-VEIN TWO: CPT | Performed by: THORACIC SURGERY (CARDIOTHORACIC VASCULAR SURGERY)

## 2024-01-18 PROCEDURE — 021109W BYPASS CORONARY ARTERY, TWO ARTERIES FROM AORTA WITH AUTOLOGOUS VENOUS TISSUE, OPEN APPROACH: ICD-10-PCS | Performed by: THORACIC SURGERY (CARDIOTHORACIC VASCULAR SURGERY)

## 2024-01-18 PROCEDURE — 700105 HCHG RX REV CODE 258: Performed by: ANESTHESIOLOGY

## 2024-01-18 PROCEDURE — 160042 HCHG SURGERY MINUTES - EA ADDL 1 MIN LEVEL 5: Performed by: THORACIC SURGERY (CARDIOTHORACIC VASCULAR SURGERY)

## 2024-01-18 PROCEDURE — 33572 OPEN CORONARY ENDARTERECTOMY: CPT | Mod: AS | Performed by: NURSE PRACTITIONER

## 2024-01-18 PROCEDURE — 94150 VITAL CAPACITY TEST: CPT

## 2024-01-18 PROCEDURE — 700102 HCHG RX REV CODE 250 W/ 637 OVERRIDE(OP): Performed by: THORACIC SURGERY (CARDIOTHORACIC VASCULAR SURGERY)

## 2024-01-18 PROCEDURE — 02100Z9 BYPASS CORONARY ARTERY, ONE ARTERY FROM LEFT INTERNAL MAMMARY, OPEN APPROACH: ICD-10-PCS | Performed by: THORACIC SURGERY (CARDIOTHORACIC VASCULAR SURGERY)

## 2024-01-18 PROCEDURE — 700105 HCHG RX REV CODE 258: Performed by: THORACIC SURGERY (CARDIOTHORACIC VASCULAR SURGERY)

## 2024-01-18 PROCEDURE — 160009 HCHG ANES TIME/MIN: Performed by: THORACIC SURGERY (CARDIOTHORACIC VASCULAR SURGERY)

## 2024-01-18 PROCEDURE — C1729 CATH, DRAINAGE: HCPCS | Performed by: THORACIC SURGERY (CARDIOTHORACIC VASCULAR SURGERY)

## 2024-01-18 PROCEDURE — 85014 HEMATOCRIT: CPT | Mod: 91

## 2024-01-18 PROCEDURE — A9270 NON-COVERED ITEM OR SERVICE: HCPCS | Performed by: NURSE PRACTITIONER

## 2024-01-18 PROCEDURE — 85049 AUTOMATED PLATELET COUNT: CPT

## 2024-01-18 PROCEDURE — 700102 HCHG RX REV CODE 250 W/ 637 OVERRIDE(OP): Performed by: NURSE PRACTITIONER

## 2024-01-18 PROCEDURE — 700111 HCHG RX REV CODE 636 W/ 250 OVERRIDE (IP): Mod: JZ | Performed by: THORACIC SURGERY (CARDIOTHORACIC VASCULAR SURGERY)

## 2024-01-18 PROCEDURE — 700105 HCHG RX REV CODE 258: Performed by: NURSE PRACTITIONER

## 2024-01-18 PROCEDURE — C1768 GRAFT, VASCULAR: HCPCS | Performed by: THORACIC SURGERY (CARDIOTHORACIC VASCULAR SURGERY)

## 2024-01-18 PROCEDURE — 82962 GLUCOSE BLOOD TEST: CPT | Mod: 91

## 2024-01-18 PROCEDURE — 770022 HCHG ROOM/CARE - ICU (200)

## 2024-01-18 PROCEDURE — P9047 ALBUMIN (HUMAN), 25%, 50ML: HCPCS

## 2024-01-18 PROCEDURE — 33533 CABG ARTERIAL SINGLE: CPT | Mod: AS | Performed by: NURSE PRACTITIONER

## 2024-01-18 PROCEDURE — 85018 HEMOGLOBIN: CPT

## 2024-01-18 PROCEDURE — 700101 HCHG RX REV CODE 250: Performed by: THORACIC SURGERY (CARDIOTHORACIC VASCULAR SURGERY)

## 2024-01-18 PROCEDURE — 110371 HCHG SHELL REV 272: Performed by: THORACIC SURGERY (CARDIOTHORACIC VASCULAR SURGERY)

## 2024-01-18 PROCEDURE — 84295 ASSAY OF SERUM SODIUM: CPT | Mod: 91

## 2024-01-18 PROCEDURE — 93325 DOPPLER ECHO COLOR FLOW MAPG: CPT

## 2024-01-18 PROCEDURE — 33508 ENDOSCOPIC VEIN HARVEST: CPT | Mod: AS,59 | Performed by: NURSE PRACTITIONER

## 2024-01-18 PROCEDURE — 5A1221Z PERFORMANCE OF CARDIAC OUTPUT, CONTINUOUS: ICD-10-PCS | Performed by: THORACIC SURGERY (CARDIOTHORACIC VASCULAR SURGERY)

## 2024-01-18 PROCEDURE — 85347 COAGULATION TIME ACTIVATED: CPT | Mod: 91

## 2024-01-18 PROCEDURE — 93005 ELECTROCARDIOGRAM TRACING: CPT | Performed by: NURSE PRACTITIONER

## 2024-01-18 PROCEDURE — 160031 HCHG SURGERY MINUTES - 1ST 30 MINS LEVEL 5: Performed by: THORACIC SURGERY (CARDIOTHORACIC VASCULAR SURGERY)

## 2024-01-18 PROCEDURE — 33533 CABG ARTERIAL SINGLE: CPT | Performed by: THORACIC SURGERY (CARDIOTHORACIC VASCULAR SURGERY)

## 2024-01-18 PROCEDURE — 82330 ASSAY OF CALCIUM: CPT | Mod: 91

## 2024-01-18 PROCEDURE — 700101 HCHG RX REV CODE 250: Performed by: ANESTHESIOLOGY

## 2024-01-18 PROCEDURE — 700111 HCHG RX REV CODE 636 W/ 250 OVERRIDE (IP): Performed by: ANESTHESIOLOGY

## 2024-01-18 DEVICE — MARKER GRAFT AC VEIN DISK SHAPED (10EA/BX): Type: IMPLANTABLE DEVICE | Site: CHEST | Status: FUNCTIONAL

## 2024-01-18 RX ORDER — PROCHLORPERAZINE EDISYLATE 5 MG/ML
10 INJECTION INTRAMUSCULAR; INTRAVENOUS EVERY 6 HOURS PRN
Status: DISCONTINUED | OUTPATIENT
Start: 2024-01-18 | End: 2024-01-23 | Stop reason: HOSPADM

## 2024-01-18 RX ORDER — INSULIN LISPRO 100 [IU]/ML
0-14 INJECTION, SOLUTION INTRAVENOUS; SUBCUTANEOUS
Status: DISPENSED | OUTPATIENT
Start: 2024-01-18 | End: 2024-01-19

## 2024-01-18 RX ORDER — VECURONIUM BROMIDE 1 MG/ML
INJECTION, POWDER, LYOPHILIZED, FOR SOLUTION INTRAVENOUS PRN
Status: DISCONTINUED | OUTPATIENT
Start: 2024-01-18 | End: 2024-01-18 | Stop reason: SURG

## 2024-01-18 RX ORDER — NITROGLYCERIN 20 MG/100ML
0-100 INJECTION INTRAVENOUS CONTINUOUS
Status: DISCONTINUED | OUTPATIENT
Start: 2024-01-18 | End: 2024-01-19

## 2024-01-18 RX ORDER — PROTAMINE SULFATE 10 MG/ML
INJECTION, SOLUTION INTRAVENOUS PRN
Status: DISCONTINUED | OUTPATIENT
Start: 2024-01-18 | End: 2024-01-18 | Stop reason: SURG

## 2024-01-18 RX ORDER — OXYCODONE HYDROCHLORIDE 10 MG/1
10 TABLET ORAL
Status: DISCONTINUED | OUTPATIENT
Start: 2024-01-18 | End: 2024-01-23 | Stop reason: HOSPADM

## 2024-01-18 RX ORDER — SODIUM CHLORIDE 9 MG/ML
INJECTION, SOLUTION INTRAVENOUS CONTINUOUS
Status: DISCONTINUED | OUTPATIENT
Start: 2024-01-18 | End: 2024-01-19

## 2024-01-18 RX ORDER — POTASSIUM CHLORIDE 14.9 MG/ML
20 INJECTION INTRAVENOUS ONCE
Status: COMPLETED | OUTPATIENT
Start: 2024-01-18 | End: 2024-01-18

## 2024-01-18 RX ORDER — ACETAMINOPHEN 500 MG
1000 TABLET ORAL EVERY 6 HOURS PRN
Status: DISCONTINUED | OUTPATIENT
Start: 2024-01-28 | End: 2024-01-23 | Stop reason: HOSPADM

## 2024-01-18 RX ORDER — SODIUM CHLORIDE, SODIUM GLUCONATE, SODIUM ACETATE, POTASSIUM CHLORIDE AND MAGNESIUM CHLORIDE 526; 502; 368; 37; 30 MG/100ML; MG/100ML; MG/100ML; MG/100ML; MG/100ML
INJECTION, SOLUTION INTRAVENOUS PRN
Status: DISCONTINUED | OUTPATIENT
Start: 2024-01-18 | End: 2024-01-19

## 2024-01-18 RX ORDER — NOREPINEPHRINE BITARTRATE 0.03 MG/ML
0-1 INJECTION, SOLUTION INTRAVENOUS CONTINUOUS
Status: CANCELLED | OUTPATIENT
Start: 2024-01-18

## 2024-01-18 RX ORDER — CEFAZOLIN 2 G/1
2 INJECTION, POWDER, FOR SOLUTION INTRAMUSCULAR; INTRAVENOUS ONCE
Status: DISCONTINUED | OUTPATIENT
Start: 2024-01-18 | End: 2024-01-18 | Stop reason: HOSPADM

## 2024-01-18 RX ORDER — ASPIRIN 81 MG/1
81 TABLET ORAL DAILY
Status: DISCONTINUED | OUTPATIENT
Start: 2024-01-19 | End: 2024-01-23 | Stop reason: HOSPADM

## 2024-01-18 RX ORDER — ATORVASTATIN CALCIUM 40 MG/1
40 TABLET, FILM COATED ORAL
Status: DISCONTINUED | OUTPATIENT
Start: 2024-01-18 | End: 2024-01-23 | Stop reason: HOSPADM

## 2024-01-18 RX ORDER — CARVEDILOL 3.12 MG/1
3.12 TABLET ORAL 2 TIMES DAILY WITH MEALS
Status: DISCONTINUED | OUTPATIENT
Start: 2024-01-19 | End: 2024-01-23 | Stop reason: HOSPADM

## 2024-01-18 RX ORDER — SODIUM CHLORIDE, SODIUM LACTATE, POTASSIUM CHLORIDE, CALCIUM CHLORIDE 600; 310; 30; 20 MG/100ML; MG/100ML; MG/100ML; MG/100ML
INJECTION, SOLUTION INTRAVENOUS
Status: DISCONTINUED | OUTPATIENT
Start: 2024-01-18 | End: 2024-01-18 | Stop reason: SURG

## 2024-01-18 RX ORDER — TRAMADOL HYDROCHLORIDE 50 MG/1
50 TABLET ORAL EVERY 4 HOURS PRN
Status: DISCONTINUED | OUTPATIENT
Start: 2024-01-18 | End: 2024-01-23 | Stop reason: HOSPADM

## 2024-01-18 RX ORDER — DIPHENHYDRAMINE HCL 25 MG
25 TABLET ORAL
Status: DISCONTINUED | OUTPATIENT
Start: 2024-01-18 | End: 2024-01-23 | Stop reason: HOSPADM

## 2024-01-18 RX ORDER — PHENYLEPHRINE HYDROCHLORIDE 10 MG/ML
INJECTION, SOLUTION INTRAMUSCULAR; INTRAVENOUS; SUBCUTANEOUS PRN
Status: DISCONTINUED | OUTPATIENT
Start: 2024-01-18 | End: 2024-01-18 | Stop reason: SURG

## 2024-01-18 RX ORDER — OMEPRAZOLE 20 MG/1
20 CAPSULE, DELAYED RELEASE ORAL DAILY
Status: DISCONTINUED | OUTPATIENT
Start: 2024-01-19 | End: 2024-01-23 | Stop reason: HOSPADM

## 2024-01-18 RX ORDER — ONDANSETRON 2 MG/ML
8 INJECTION INTRAMUSCULAR; INTRAVENOUS EVERY 6 HOURS PRN
Status: DISCONTINUED | OUTPATIENT
Start: 2024-01-18 | End: 2024-01-23 | Stop reason: HOSPADM

## 2024-01-18 RX ORDER — EPINEPHRINE HCL IN 0.9 % NACL 4MG/250ML
0-.5 PLASTIC BAG, INJECTION (ML) INTRAVENOUS CONTINUOUS
Status: CANCELLED | OUTPATIENT
Start: 2024-01-18

## 2024-01-18 RX ORDER — METHADONE HYDROCHLORIDE 10 MG/ML
20 INJECTION, SOLUTION INTRAMUSCULAR; INTRAVENOUS; SUBCUTANEOUS ONCE
Status: DISCONTINUED | OUTPATIENT
Start: 2024-01-18 | End: 2024-01-18 | Stop reason: HOSPADM

## 2024-01-18 RX ORDER — POTASSIUM CHLORIDE 7.45 MG/ML
10 INJECTION INTRAVENOUS ONCE
Status: COMPLETED | OUTPATIENT
Start: 2024-01-18 | End: 2024-01-18

## 2024-01-18 RX ORDER — ACETAMINOPHEN 500 MG
1000 TABLET ORAL EVERY 6 HOURS
Status: DISCONTINUED | OUTPATIENT
Start: 2024-01-18 | End: 2024-01-23 | Stop reason: HOSPADM

## 2024-01-18 RX ORDER — PAPAVERINE HYDROCHLORIDE 30 MG/ML
INJECTION INTRAMUSCULAR; INTRAVENOUS
Status: DISCONTINUED | OUTPATIENT
Start: 2024-01-18 | End: 2024-01-18 | Stop reason: HOSPADM

## 2024-01-18 RX ORDER — ACETAMINOPHEN 500 MG
1000 TABLET ORAL ONCE
Status: COMPLETED | OUTPATIENT
Start: 2024-01-18 | End: 2024-01-18

## 2024-01-18 RX ORDER — MORPHINE SULFATE 4 MG/ML
4 INJECTION INTRAVENOUS
Status: DISCONTINUED | OUTPATIENT
Start: 2024-01-18 | End: 2024-01-19

## 2024-01-18 RX ORDER — CEFAZOLIN SODIUM 1 G/3ML
INJECTION, POWDER, FOR SOLUTION INTRAMUSCULAR; INTRAVENOUS PRN
Status: DISCONTINUED | OUTPATIENT
Start: 2024-01-18 | End: 2024-01-18 | Stop reason: SURG

## 2024-01-18 RX ORDER — BISACODYL 10 MG
10 SUPPOSITORY, RECTAL RECTAL
Status: DISCONTINUED | OUTPATIENT
Start: 2024-01-18 | End: 2024-01-23 | Stop reason: HOSPADM

## 2024-01-18 RX ORDER — HEPARIN SODIUM,PORCINE 1000/ML
VIAL (ML) INJECTION PRN
Status: DISCONTINUED | OUTPATIENT
Start: 2024-01-18 | End: 2024-01-18 | Stop reason: SURG

## 2024-01-18 RX ORDER — MIDAZOLAM HYDROCHLORIDE 1 MG/ML
INJECTION INTRAMUSCULAR; INTRAVENOUS PRN
Status: DISCONTINUED | OUTPATIENT
Start: 2024-01-18 | End: 2024-01-18 | Stop reason: SURG

## 2024-01-18 RX ORDER — SODIUM CHLORIDE 9 MG/ML
INJECTION, SOLUTION INTRAVENOUS CONTINUOUS
Status: DISCONTINUED | OUTPATIENT
Start: 2024-01-18 | End: 2024-01-20

## 2024-01-18 RX ORDER — MAGNESIUM HYDROXIDE 1200 MG/15ML
LIQUID ORAL
Status: COMPLETED | OUTPATIENT
Start: 2024-01-18 | End: 2024-01-18

## 2024-01-18 RX ORDER — MAGNESIUM SULFATE 1 G/100ML
1 INJECTION INTRAVENOUS DAILY
Status: COMPLETED | OUTPATIENT
Start: 2024-01-18 | End: 2024-01-20

## 2024-01-18 RX ORDER — DEXMEDETOMIDINE HYDROCHLORIDE 4 UG/ML
0-1.5 INJECTION, SOLUTION INTRAVENOUS CONTINUOUS
Status: DISCONTINUED | OUTPATIENT
Start: 2024-01-18 | End: 2024-01-19

## 2024-01-18 RX ORDER — AMOXICILLIN 250 MG
2 CAPSULE ORAL 2 TIMES DAILY
Status: DISCONTINUED | OUTPATIENT
Start: 2024-01-18 | End: 2024-01-23 | Stop reason: HOSPADM

## 2024-01-18 RX ORDER — MIDAZOLAM HYDROCHLORIDE 1 MG/ML
2 INJECTION INTRAMUSCULAR; INTRAVENOUS
Status: DISCONTINUED | OUTPATIENT
Start: 2024-01-18 | End: 2024-01-19

## 2024-01-18 RX ORDER — ENOXAPARIN SODIUM 100 MG/ML
40 INJECTION SUBCUTANEOUS DAILY
Status: DISCONTINUED | OUTPATIENT
Start: 2024-01-19 | End: 2024-01-23 | Stop reason: HOSPADM

## 2024-01-18 RX ORDER — NOREPINEPHRINE BITARTRATE 0.03 MG/ML
0-1 INJECTION, SOLUTION INTRAVENOUS CONTINUOUS
Status: DISCONTINUED | OUTPATIENT
Start: 2024-01-18 | End: 2024-01-20

## 2024-01-18 RX ORDER — CLOPIDOGREL BISULFATE 75 MG/1
75 TABLET ORAL DAILY
Status: DISCONTINUED | OUTPATIENT
Start: 2024-01-19 | End: 2024-01-23 | Stop reason: HOSPADM

## 2024-01-18 RX ORDER — OXYCODONE HYDROCHLORIDE 5 MG/1
5 TABLET ORAL
Status: DISCONTINUED | OUTPATIENT
Start: 2024-01-18 | End: 2024-01-23 | Stop reason: HOSPADM

## 2024-01-18 RX ORDER — EPINEPHRINE HCL IN 0.9 % NACL 4MG/250ML
0-.5 PLASTIC BAG, INJECTION (ML) INTRAVENOUS CONTINUOUS
Status: DISCONTINUED | OUTPATIENT
Start: 2024-01-18 | End: 2024-01-19

## 2024-01-18 RX ORDER — VANCOMYCIN HYDROCHLORIDE 500 MG/10ML
INJECTION, POWDER, LYOPHILIZED, FOR SOLUTION INTRAVENOUS
Status: COMPLETED | OUTPATIENT
Start: 2024-01-18 | End: 2024-01-18

## 2024-01-18 RX ORDER — ALUMINA, MAGNESIA, AND SIMETHICONE 2400; 2400; 240 MG/30ML; MG/30ML; MG/30ML
30 SUSPENSION ORAL EVERY 4 HOURS PRN
Status: DISCONTINUED | OUTPATIENT
Start: 2024-01-18 | End: 2024-01-23 | Stop reason: HOSPADM

## 2024-01-18 RX ORDER — METHADONE HYDROCHLORIDE 10 MG/ML
INJECTION, SOLUTION INTRAMUSCULAR; INTRAVENOUS; SUBCUTANEOUS PRN
Status: DISCONTINUED | OUTPATIENT
Start: 2024-01-18 | End: 2024-01-18 | Stop reason: SURG

## 2024-01-18 RX ORDER — POLYETHYLENE GLYCOL 3350 17 G/17G
1 POWDER, FOR SOLUTION ORAL DAILY
Status: DISCONTINUED | OUTPATIENT
Start: 2024-01-19 | End: 2024-01-23 | Stop reason: HOSPADM

## 2024-01-18 RX ADMIN — Medication 1 APPLICATOR: at 20:18

## 2024-01-18 RX ADMIN — NOREPINEPHRINE BITARTRATE 0.1 MCG/KG/MIN: 1 INJECTION, SOLUTION, CONCENTRATE INTRAVENOUS at 08:27

## 2024-01-18 RX ADMIN — MORPHINE SULFATE 4 MG: 4 INJECTION, SOLUTION INTRAMUSCULAR; INTRAVENOUS at 13:45

## 2024-01-18 RX ADMIN — POTASSIUM CHLORIDE 20 MEQ: 14.9 INJECTION, SOLUTION INTRAVENOUS at 13:52

## 2024-01-18 RX ADMIN — AMINOCAPROIC ACID 2 G/HR: 250 INJECTION, SOLUTION INTRAVENOUS at 11:40

## 2024-01-18 RX ADMIN — SODIUM BICARBONATE 100 MEQ: 84 INJECTION, SOLUTION INTRAVENOUS at 17:29

## 2024-01-18 RX ADMIN — METOPROLOL TARTRATE 12.5 MG: 25 TABLET, FILM COATED ORAL at 07:05

## 2024-01-18 RX ADMIN — SODIUM CHLORIDE: 9 INJECTION, SOLUTION INTRAVENOUS at 13:37

## 2024-01-18 RX ADMIN — PROTAMINE SULFATE 250 MG: 10 INJECTION, SOLUTION INTRAVENOUS at 12:06

## 2024-01-18 RX ADMIN — LIDOCAINE HYDROCHLORIDE 0.5 ML: 10 SOLUTION INTRAVENOUS at 07:11

## 2024-01-18 RX ADMIN — PHENYLEPHRINE HYDROCHLORIDE 200 MCG: 10 INJECTION INTRAVENOUS at 08:03

## 2024-01-18 RX ADMIN — SODIUM BICARBONATE 50 MEQ: 84 INJECTION, SOLUTION INTRAVENOUS at 15:20

## 2024-01-18 RX ADMIN — Medication 1 APPLICATOR: at 14:10

## 2024-01-18 RX ADMIN — CALCIUM CHLORIDE 1000 MG: 100 INJECTION, SOLUTION INTRAVENOUS at 14:29

## 2024-01-18 RX ADMIN — ACETAMINOPHEN 1000 MG: 500 TABLET ORAL at 06:33

## 2024-01-18 RX ADMIN — METHADONE HYDROCHLORIDE 10 MG: 10 INJECTION, SOLUTION INTRAMUSCULAR; INTRAVENOUS; SUBCUTANEOUS at 08:53

## 2024-01-18 RX ADMIN — PHENYLEPHRINE HYDROCHLORIDE 200 MCG: 10 INJECTION INTRAVENOUS at 08:17

## 2024-01-18 RX ADMIN — CEFAZOLIN 2 G: 2 INJECTION, POWDER, FOR SOLUTION INTRAMUSCULAR; INTRAVENOUS at 20:15

## 2024-01-18 RX ADMIN — ONDANSETRON 8 MG: 2 INJECTION INTRAMUSCULAR; INTRAVENOUS at 18:54

## 2024-01-18 RX ADMIN — SODIUM CHLORIDE 2 UNITS/HR: 9 INJECTION, SOLUTION INTRAVENOUS at 10:52

## 2024-01-18 RX ADMIN — SODIUM CHLORIDE, SODIUM GLUCONATE, SODIUM ACETATE, POTASSIUM CHLORIDE AND MAGNESIUM CHLORIDE: 526; 502; 368; 37; 30 INJECTION, SOLUTION INTRAVENOUS at 13:59

## 2024-01-18 RX ADMIN — SODIUM CHLORIDE: 9 INJECTION, SOLUTION INTRAVENOUS at 13:38

## 2024-01-18 RX ADMIN — OXYCODONE HYDROCHLORIDE 5 MG: 5 TABLET ORAL at 20:09

## 2024-01-18 RX ADMIN — ACETAMINOPHEN 1000 MG: 500 TABLET ORAL at 20:09

## 2024-01-18 RX ADMIN — VECURONIUM BROMIDE 20 MG: 1 INJECTION, POWDER, LYOPHILIZED, FOR SOLUTION INTRAVENOUS at 07:42

## 2024-01-18 RX ADMIN — ATORVASTATIN CALCIUM 40 MG: 40 TABLET, FILM COATED ORAL at 20:09

## 2024-01-18 RX ADMIN — HEPARIN SODIUM 38000 UNITS: 1000 INJECTION, SOLUTION INTRAVENOUS; SUBCUTANEOUS at 10:06

## 2024-01-18 RX ADMIN — NOREPINEPHRINE BITARTRATE 0.05 MCG/KG/MIN: 1 INJECTION INTRAVENOUS at 16:38

## 2024-01-18 RX ADMIN — MIDAZOLAM HYDROCHLORIDE 2 MG: 1 INJECTION, SOLUTION INTRAMUSCULAR; INTRAVENOUS at 16:25

## 2024-01-18 RX ADMIN — METHADONE HYDROCHLORIDE 10 MG: 10 INJECTION, SOLUTION INTRAMUSCULAR; INTRAVENOUS; SUBCUTANEOUS at 08:20

## 2024-01-18 RX ADMIN — CEFAZOLIN 2 G: 1 INJECTION, POWDER, FOR SOLUTION INTRAMUSCULAR; INTRAVENOUS at 08:36

## 2024-01-18 RX ADMIN — VECURONIUM BROMIDE 4 MG: 1 INJECTION, POWDER, LYOPHILIZED, FOR SOLUTION INTRAVENOUS at 10:32

## 2024-01-18 RX ADMIN — PHENYLEPHRINE HYDROCHLORIDE 200 MCG: 10 INJECTION INTRAVENOUS at 08:25

## 2024-01-18 RX ADMIN — DEXMEDETOMIDINE HYDROCHLORIDE 1.5 MCG/KG/HR: 100 INJECTION, SOLUTION INTRAVENOUS at 08:43

## 2024-01-18 RX ADMIN — DEXMEDETOMIDINE 0.4 MCG/KG/HR: 100 INJECTION, SOLUTION INTRAVENOUS at 13:15

## 2024-01-18 RX ADMIN — MIDAZOLAM HYDROCHLORIDE 2 MG: 1 INJECTION, SOLUTION INTRAMUSCULAR; INTRAVENOUS at 07:36

## 2024-01-18 RX ADMIN — EPINEPHRINE 0.05 MCG/KG/MIN: 1 INJECTION INTRAMUSCULAR; INTRAVENOUS; SUBCUTANEOUS at 12:00

## 2024-01-18 RX ADMIN — MAGNESIUM SULFATE IN DEXTROSE 1 G: 10 INJECTION, SOLUTION INTRAVENOUS at 14:08

## 2024-01-18 RX ADMIN — SODIUM BICARBONATE 50 MEQ: 84 INJECTION, SOLUTION INTRAVENOUS at 16:26

## 2024-01-18 RX ADMIN — DEXMEDETOMIDINE 0.4 MCG/KG/HR: 100 INJECTION, SOLUTION INTRAVENOUS at 17:33

## 2024-01-18 RX ADMIN — POTASSIUM CHLORIDE 10 MEQ: 7.46 INJECTION, SOLUTION INTRAVENOUS at 16:12

## 2024-01-18 RX ADMIN — AMINOCAPROIC ACID 872.5 MG: 250 INJECTION, SOLUTION INTRAVENOUS at 10:13

## 2024-01-18 RX ADMIN — AMINOCAPROIC ACID 9340 MG: 250 INJECTION, SOLUTION INTRAVENOUS at 11:10

## 2024-01-18 RX ADMIN — FENTANYL CITRATE 250 MCG: 50 INJECTION, SOLUTION INTRAMUSCULAR; INTRAVENOUS at 07:42

## 2024-01-18 RX ADMIN — SODIUM CHLORIDE, SODIUM GLUCONATE, SODIUM ACETATE, POTASSIUM CHLORIDE AND MAGNESIUM CHLORIDE: 526; 502; 368; 37; 30 INJECTION, SOLUTION INTRAVENOUS at 15:30

## 2024-01-18 RX ADMIN — PROPOFOL 120 MG: 10 INJECTION, EMULSION INTRAVENOUS at 07:42

## 2024-01-18 RX ADMIN — CEFAZOLIN 2 G: 1 INJECTION, POWDER, FOR SOLUTION INTRAMUSCULAR; INTRAVENOUS at 12:32

## 2024-01-18 RX ADMIN — SODIUM CHLORIDE, POTASSIUM CHLORIDE, SODIUM LACTATE AND CALCIUM CHLORIDE: 600; 310; 30; 20 INJECTION, SOLUTION INTRAVENOUS at 09:04

## 2024-01-18 RX ADMIN — EPINEPHRINE 0.04 MCG/KG/MIN: 1 INJECTION INTRAMUSCULAR; INTRAVENOUS; SUBCUTANEOUS at 13:15

## 2024-01-18 RX ADMIN — PHENYLEPHRINE HYDROCHLORIDE 200 MCG: 10 INJECTION INTRAVENOUS at 08:07

## 2024-01-18 ASSESSMENT — PAIN DESCRIPTION - PAIN TYPE
TYPE: ACUTE PAIN;SURGICAL PAIN
TYPE: SURGICAL PAIN
TYPE: ACUTE PAIN;SURGICAL PAIN

## 2024-01-18 ASSESSMENT — PULMONARY FUNCTION TESTS: FVC: 1116

## 2024-01-18 ASSESSMENT — FIBROSIS 4 INDEX
FIB4 SCORE: 1.17
FIB4 SCORE: 1.17

## 2024-01-18 NOTE — ASSESSMENT & PLAN NOTE
Status post three-vessel CABG with RCA endarterectomy with intraoperative CYNDIE 1/18/2024  Continue routine postoperative management  Monitor mediastinal chest tube output (possible removal today), monitor for arrhythmias  Strict blood pressure management  As needed analgesics  Recommend antiplatelet when appropriate, statin  S/p norepinephrine drip  Transition off insulin infusion for tight glycemic control in the post-operative setting

## 2024-01-18 NOTE — ANESTHESIA PROCEDURE NOTES
Central Venous Line    Performed by: Dale Biggs M.D.  Authorized by: Dale Biggs M.D.    Start Time:  1/18/2024 7:59 AM  End Time:  1/18/2024 8:08 AM  Patient Location:  OR  Indication: central venous access and hemodynamic monitoring        provider hand hygiene performed prior to central venous catheter insertion, all 5 sterile barriers used (gloves, gown, cap, mask, large sterile drape) during central venous catheter insertion and skin prep agent completely dried prior to procedure    Patient Position:  Trendelenburg  Laterality:  Right  Site:  Internal jugular  Prep:  Chlorhexidine  Catheter Size:  7 Fr  Catheter Length (cm):  20  Number of Lumens:  Triple lumen  target vein identified, needle advanced into vein and blood aspirated and guidewire advanced into vein    Seldinger Technique?: Yes    Ultrasound-Guided: ultrasound-guided  Image captured, interpreted and electronically stored.  Sterile Gel and Probe Cover Used for Ultrasound?: Yes    Intravenous Verification: verified by ultrasound, venous blood return and chest x-ray pending    all ports aspirated, all ports flushed easily, guidewire was removed intact, biopatch was applied, line was sutured in place and dressing was applied    Events: patient tolerated procedure well with no complications    PA Catheter Placed?: No     Single stick       34

## 2024-01-18 NOTE — DISCHARGE PLANNING
Discharge Appointments/outpatient referrals/ and  set up:    Cardiac surgery follow up appointment made for 4-5 weeks out    Hospital discharge team/schedulers called for cardiology f/u appointment for MD or APRN within 3-4 weeks if possible.    Aortic surveillance program/vascular medicine referral not needed, orders not placed.    Anticoagulation referral/ coumadin clinic referral not needed and orders not placed .    INR draws are not indicated for CABG procedure and Plavix.    Will await PT/OT notes and medical progression to determine further discharge needs.

## 2024-01-18 NOTE — CONSULTS
Critical Care Consultation    Date of consult: 1/18/2024    Referring Physician  DORINA Ellington*    Reason for Consultation  Perioperative critical care management    History of Presenting Illness  68 y.o. male who presented 1/18/2024 with a past medical history significant for hypertension and hyperlipidemia, CAD and peptic ulcer disease who underwent cardiac catheterization on 12/21 after having a positive cardiac stress test while being worked up for stable angina.  He was found to have severe three-vessel disease with mildly elevated LVEDP of 18 mmHg and cardiothoracic surgery was consulted.  They took him to the operating room today for an elective three-vessel CABG with Dr. Bacon.  Procedure was performed without complication and the following vessels were bypassed: LIMA to LAD, RSVG to OM1, R SVG to PDA, right coronary artery endarterectomy and CYNDIE   Per anesthesiologist report at bedside: Patient had an easy airway and was intubated with a 7.5 endotracheal tube at 23 cm at the teeth with a grade 1 view.  A right IJ central venous catheter and a left radial arterial catheter were placed.  Patient received 600 cc of Cell Saver, 2 L of crystalloid and had a 700 mL urine output during the case.  He is on a Precedex drip at 0.4 and an epinephrine infusion at 0.03.  He had a depressed ejection fraction of 35% post anesthesia induction which improved to normal post bypass.  He was noted to have a functional bicuspid aortic valve on CYNDIE without a gradient across the valve.  He was given 20 of methadone and his last glucose was 105.  No pacing was required post bypass.    Code Status  Full Code    Review of Systems  Review of Systems   Unable to perform ROS: Intubated       Past Medical History   has a past medical history of BMI 27.0-27.9,adult (07/28/2020), Heart burn, High cholesterol, and Hypertension.    Surgical History   has a past surgical history that includes pr reconstruc toe deform,soft  tissue (Left, 12/30/2021); pr cor hlx vlgs prx phlx osteot (Left, 12/30/2021); pr osteotomy metatarsals,multiple (Left, 12/30/2021); pr part excis 5th metatarsal head (Left, 12/30/2021); pr repair of hammertoe,one (Left, 12/30/2021); and pr excis interdigital neuroma,ea (Left, 12/30/2021).    Family History  family history includes Heart Disease in his father and mother.    Social History   reports that he has never smoked. He has never used smokeless tobacco. He reports current alcohol use of about 0.6 oz of alcohol per week. He reports that he does not currently use drugs.    Medications  Home Medications       Reviewed by Juany Walters R.N. (Registered Nurse) on 01/18/24 at 0851  Med List Status: Not Addressed     Medication Last Dose Status   aminocaproic acid (Amicar) 5 g in  mL Infusion  Active   aminocaproic acid (Amicar) 9.34 g in  mL IV Bolus  Active   aspirin 81 MG EC tablet 1/16/2024 Active   atorvastatin (LIPITOR) 20 MG Tab 1/16/2024 Active   atorvastatin (LIPITOR) 20 MG Tab  Active   carvedilol (COREG) 3.125 MG Tab 12/16/2023 Active   ceFAZolin (Ancef) injection 2 g  Active   dexmedetomidine (PRECEDEX) 400 mcg/100mL NS premix infusion  Active   EPINEPHrine (Adrenalin) infusion 4 mg/250 mL (premix)  Active   insulin regular (Humulin R) 100 Units in  mL Infusion  Active   lidocaine (Xylocaine) 1 % injection 0.5 mL  Active   norepinephrine (Levophed) 8 mg in 250 mL NS infusion (premix)  Active   omeprazole (PRILOSEC) 20 MG delayed-release capsule  Active   Sildenafil Citrate (VIAGRA PO) 1/11/2024 Active                  Current Facility-Administered Medications   Medication Dose Route Frequency Provider Last Rate Last Admin    lidocaine (Xylocaine) 1 % injection 0.5 mL  0.5 mL Intradermal Once PRN Arnaldo Bacon, D.O.   0.5 mL at 01/18/24 0711    methadone 10 mg/mL (Dolophine) injection 20 mg  20 mg Intravenous Once Arnaldo Thomasont, D.O.        ceFAZolin (Ancef) injection 2  g  2 g Intravenous Once Arnaldo Schroederstedt, D.O.        heparin 2,000 Units in electrolyte-A (Plasmalyte-A) 1,000 mL    Intra-Op Once PRN Arnaldo Thomasont, D.O.   2,000 Units at 01/18/24 0907    papaverine injection    Intra-Op Once PRN Arnaldo Bacon, D.O.   30 mg at 01/18/24 0907    sodium chloride for irrigation 0.9 % irrigant    Intra-Op Continuous Arnaldo Carrascoedt, D.O.   4,000 mL at 01/18/24 0908    vancomycin (Vancocin) injection    Intra-Op Continuous Arnaldo Thomasont, D.O.   2 g at 01/18/24 1212    insulin regular (Humulin R) 100 Units in  mL Infusion  1-6 Units/hr Intravenous Continuous Arnaldo Thomasont, D.O.   Stopped at 01/18/24 1144    EPINEPHrine (Adrenalin) infusion 4 mg/250 mL (premix)  0-0.5 mcg/kg/min (Ideal) Intravenous Continuous Arnaldo Thomasont, D.O.        norepinephrine (Levophed) 8 mg in 250 mL NS infusion (premix)  0-1 mcg/kg/min (Ideal) Intravenous Continuous Arnaldo Thomasont, D.O.        dexmedetomidine (PRECEDEX) 400 mcg/100mL NS premix infusion  0-1.5 mcg/kg/hr (Ideal) Intravenous Continuous Arnaldo Thomasont, D.O. 27.375 mL/hr at 01/18/24 0843 1.5 mcg/kg/hr at 01/18/24 0843    aminocaproic acid (Amicar) 9.34 g in  mL IV Bolus  100 mg/kg Intravenous Once Arnaldo Carrascoedt, D.O.        aminocaproic acid (Amicar) 5 g in  mL Infusion  2 g/hr Intravenous Once Arnaldo Thomasont, D.O.         Facility-Administered Medications Ordered in Other Encounters   Medication Dose Route Frequency Provider Last Rate Last Admin    Lactated Ringers   Intravenous Intra-Op Continuous Dale Biggs M.D. 500 mL/hr at 01/18/24 0904 New Bag at 01/18/24 0904    ceFAZolin (Ancef) injection   Intravenous PRN Dale Biggs M.D.   2 g at 01/18/24 0836    midazolam (Versed) injection   Intravenous PRN Dale Biggs M.D.   2 mg at 01/18/24 0736    fentaNYL (Sublimaze) injection   Intravenous PRN Dale Biggs M.D.   250 mcg at 01/18/24 0742    propofol  (DIPRIVAN) injection   Intravenous PRN Dale Biggs M.D.   120 mg at 01/18/24 0742    vecuronium (Norcuron) injection   Intravenous PRN Dale Biggs M.D.   20 mg at 01/18/24 0742    methadone 10 mg/mL (Dolophine) injection   Intravenous PRN Dale Biggs M.D.   10 mg at 01/18/24 0853    phenylephrine (Kenneth-Synephrine) injection   Intravenous PRN Dale Biggs M.D.   200 mcg at 01/18/24 0825    norepinephrine (Levophed) 32 mg in  mL Infusion   Intravenous Intra-Op Continuous Dale Biggs M.D.   Stopped at 01/18/24 1046    heparin OR USE ONLY   Intravenous PRN Dale Biggs M.D.   38,000 Units at 01/18/24 1006    protamine injection   Intravenous PRN Dale Biggs M.D.   250 mg at 01/18/24 1206       Allergies  No Known Allergies    Vital Signs last 24 hours  Temp:  [36.6 °C (97.8 °F)] 36.6 °C (97.8 °F)  Pulse:  [59] 59  BP: (127-141)/(78-79) 127/78  SpO2:  [97 %] 97 %    Physical Exam  Physical Exam  Vitals and nursing note reviewed.   Constitutional:       General: He is sleeping.      Appearance: He is well-developed and overweight.      Interventions: He is sedated and intubated.   HENT:      Head: Normocephalic.      Nose: Nose normal. No congestion.      Mouth/Throat:      Mouth: Mucous membranes are moist.      Pharynx: Oropharynx is clear.      Comments: ETT in place  Eyes:      General: No scleral icterus.     Conjunctiva/sclera: Conjunctivae normal.      Pupils: Pupils are equal, round, and reactive to light.   Neck:      Comments: R IJ CVL in place with oozing under dressing  Cardiovascular:      Rate and Rhythm: Normal rate and regular rhythm. Occasional Extrasystoles are present.     Chest Wall: PMI is displaced.      Pulses: Decreased pulses.      Heart sounds: Heart sounds are distant. No murmur heard.     Comments: Mediastinal incision is clean/dry/intact with mediastinal chest tubes and epicardial pacing wires in place. Epinephrine gtt  Pulmonary:      Effort: No tachypnea or accessory muscle  usage. He is intubated.      Breath sounds: Examination of the right-lower field reveals rales. Examination of the left-lower field reveals rales. Rales present. No wheezing or rhonchi.      Comments: % support, PEEP of 8, 100% FiO2 with good compliance, delta P19  Abdominal:      General: Bowel sounds are normal. There is no distension.      Palpations: Abdomen is soft.      Tenderness: There is no abdominal tenderness. There is no guarding or rebound.   Genitourinary:     Comments: Lynn catheter in place  Musculoskeletal:         General: No tenderness.      Cervical back: Neck supple. No rigidity.      Right lower leg: No edema.      Left lower leg: No edema.      Comments: Left radial arterial catheter in place without surrounding hematoma, vein harvest site dressing is clean/dry/intact   Skin:     General: Skin is warm and dry.      Capillary Refill: Capillary refill takes 2 to 3 seconds.      Coloration: Skin is not pale.   Neurological:      Comments: Heavily sedated postanesthetic         Fluids  No intake or output data in the 24 hours ending 01/18/24 1213    Laboratory  Recent Results (from the past 48 hour(s))   ABO Rh Confirm    Collection Time: 01/18/24  6:34 AM   Result Value Ref Range    ABO Rh Confirm A POS        Imaging  DX-CHEST-PORTABLE (1 VIEW)    (Results Pending)   EC-CYNDIE W/O CONT    (Results Pending)    *Personally reviewed chest x-ray postoperatively showing: Postoperative changes including enlarged cardiac mediastinal silhouette, pulmonary edema, sternotomy wires, endotracheal tube/right IJ central venous catheter mediastinal chest tubes are in good position    Assessment/Plan  * Atherosclerosis of native coronary artery of native heart with angina pectoris (HCC)  Assessment & Plan  Status post three-vessel CABG with RCA endarterectomy with intraoperative CYNDIE 1/18/2024  Routine postoperative management  Monitor mediastinal chest tube output, monitor for arrhythmias  Strict blood  pressure management  As needed analgesics  Eventual antiplatelet when appropriate, statin  Titrate epinephrine drip to goal mean arterial pressure greater than 65  Insulin infusion for tight glycemic control in the post-operative setting    Encounter for weaning from ventilator (HCC)  Assessment & Plan  Intubated intraoperatively 1/18/2024  Continue full mechanical ventilatory support using ASV mode titrating per protocol  Titrate FiO2 to goal SPO2 greater than 92%  RT/O2 protocol  Dexmedetomidine infusion titrating to adequate sedation  ABCDEF bundle  Eventual diuresis    Obesity (BMI 30-39.9)- (present on admission)  Assessment & Plan  Encourage behavioral and dietary modification for weight loss when appropriate    Elevated blood pressure reading in office without diagnosis of hypertension- (present on admission)  Assessment & Plan  Currently hypotensive postoperatively  Hold scheduled antihypertensives and monitor    Dyslipidemia- (present on admission)  Assessment & Plan  Continue atorvastatin    Acute blood loss as cause of postoperative anemia  Assessment & Plan  Serial CBC with conservative transfusion strategy  Monitor for bleeding through chest tube        Discussed patient condition and risk of morbidity and/or mortality with RN, RT, Pharmacy, Charge nurse / hot rounds, Patient, CVS, and anesthesiology .    The patient remains critically ill.  Critical care time = 37 minutes in directly providing and coordinating critical care and extensive data review.  No time overlap and excludes procedures.    Please note that this dictation was created using voice recognition software. I have made every reasonable attempt to correct obvious errors, but there may be errors of grammar and possibly content that I did not discover before finalizing the note.

## 2024-01-18 NOTE — ANESTHESIA POSTPROCEDURE EVALUATION
Patient: Jeo Morelos    Procedure Summary       Date: 01/18/24 Room / Location: [facility] OR 03 / SURGERY [facility]    Anesthesia Start: 0734 Anesthesia Stop: 1311    Procedures:       CORONARY ARTERY BYPASS GRAFT X3, ENDOSCOPIC VEIN HARVEST (Chest)      ECHOCARDIOGRAM, TRANSESOPHAGEAL, INTRAOPERATIVE (Esophagus)      ENDARTERECTOMY- RIGHT CORONARY ARTERY (Chest) Diagnosis: (CORANARY ARTERY DISEASE)    Surgeons: Arnaldo Bacon D.O. Responsible Provider: Dale Biggs M.D.    Anesthesia Type: general ASA Status: 4            Final Anesthesia Type: general  Last vitals  BP   Blood Pressure : 127/78 (left arm)    Temp   36.6 °C (97.8 °F)    Pulse   (!) 59   Resp        SpO2   97 %      Anesthesia Post Evaluation    Patient location during evaluation: ICU  Patient participation: complete - patient participated    Airway patency: patent  Anesthetic complications: no  Cardiovascular status: hemodynamically stable  Respiratory status: acceptable and intubated  Hydration status: euvolemic  Comments: Stable in ICU . No complications    PONV: none  patient was unable to participate        No notable events documented.     Nurse Pain Score: 0 (NPRS)

## 2024-01-18 NOTE — OP REPORT
Operative report    PreOp Diagnosis: Angina pectoris, multivessel coronary artery disease, hypertension, hyperlipidemia, history of peptic ulcer disease, history of pancreatic lesion      PostOp Diagnosis: Same as above      Procedure(s):  CORONARY ARTERY BYPASS GRAFT X3 WITH SKELETONIZED REYES TO LAD, RSVG TO OM1, RSVG TO PDA  ENDARTERECTOMY- RIGHT CORONARY ARTERY - Wound Class: Clean with Drain  ENDOSCOPIC VEIN HARVEST OF LEFT GREATER SAPHENOUS VEIN- Wound Class: Clean with Drain  ECHOCARDIOGRAM, TRANSESOPHAGEAL, INTRAOPERATIVE - Wound Class: Clean Contaminated      Surgeon(s):  Arnaldo Bacon D.O.    Anesthesiologist/Type of Anesthesia:  Anesthesiologist: Dale Biggs M.D./General    Surgical Staff:  Assistant: ODALYS Topete  Circulator: Juany Walters R.N.  Perfusionist: Francisca Becerra Circulator: Sandhya Kincaid R.N.  Scrub Person: Chio Pack; Tammy Marlow    Specimens removed if any:  ID Type Source Tests Collected by Time Destination   A : Right coronary artery plaque Other Other PATHOLOGY SPECIMEN Arnaldo Bacon D.O. 1/18/2024 11:03 AM        Estimated Blood Loss: Cardiopulmonary bypass    Findings:     Preoperative echo showed reduction in his ejection fraction to 35% with global hypokinesia.  Postoperatively, on minimal inotropic support his EF is back to normal.  Aortic valve was noted to be functionally bicuspid, with no significant gradient.  Mild AI and mild MR noted.    Mammary artery was 2 to 3 mm size with excellent flow.  Saphenous vein was 4 to 5 mm size with varicosities that required oversewing.    LAD was a good target greater than 2 mm in size with proximal calcification.  OM1 was a good target with proximal calcification and otherwise clean vessel.  Distal right and proximal PDA were chronically occluded with no lumen.  Necessitated an endarterectomy of the right coronary artery.  Flow down all grafts was noted to be excellent on visual estimation.   It was good on hand-injection of vein grafts.  Cardioplegia instillation down the graft was also used to check flows.  Flow down the PDA was 80 cc/min at a pressure of 100 mmHg.  Flow down the OM1 was 90 cc/min at a pressure of 135 mmHg.    Patient did not require defibrillation to resume a normal sinus rhythm.  He  from bypass without issue.    Vancomycin was applied to sternum     Complications: None immediate    Patient condition: Guarded to ICU    Chest tubes: Mediastinal: 32 Beng[place] x2    Endoscopic vein harvest: Left greater saphenous vein.    Pacing wires: RV x 2    Pericardium: Open    Cross-clamp time: 58 minutes    Pump time: 74 minutes    Cardioplegia: Cold blood antegrade [place] cardioplegia given.  Initial induction with 1000 mL.  300 cc of warm blood given as antegrade hotshot    Vent: Aortic root      Procedure:    After informed consent was obtained, the patient was brought to the operating room.  They were placed in supine position on the operating table.  General anesthesia was induced via endotracheal tube.  Lines, arterial line, Lynn were placed by the nursing and anesthesia teams.  They received pre-incision antibiotics and beta-blockade.  The patient was prepped in a sterile fashion from their chin to their feet.  Drapes were placed in a sterile fashion.  The procedure was begun with a timeout.    I made a sternotomy incision with a 10 blade scalpel.  Concurrently to this, DENIS Flores harvested the saphenous vein in the usual fashion.  Once removed from the leg, the leg was closed in standard fashion.  The vein was then prepped on the back table.  The then join me at the head of the table and assisted in all aspects of the case.    I deepened my sternal incision with left cautery to the sternum.  The sternum was divided in the midline using the sternal saw.  Hemostasis of the sternal edges was obtained using bone putty and electrocautery.  The mammary retractor was brought  up at that point and the left hemisternum was elevated.  Using the electrocautery with occasional clips for side branches, the mammary was dissected down from its takeoff at the subclavian down to its bifurcation into the muscular phrenic and inferior epigastric, in a skeletonized fashion.    Once the mammary was completely dissected out, systemic heparinization was performed, using 400 units/kg of heparin, to achieve an ACT greater than 480 seconds. The mammary was ligated distally and divided.  There was excellent flow noted.  I placed a small clip at the distal end of the mammary, treated it with papaverine, and placed it back in the left chest for safe keeping.  The distal stump was secured.  The mammary retractor was removed and sternal retractor was placed in the chest open.    With the chest open, I then dissected out the innominate vein and the pericardium.  I then opened the pericardium and inverted T fashion.  I created pericardial well, suspending the pericardial edges from the skin edges using interrupted 2-0 Eth[place] sutures.  Inspection of the heart and the aorta revealed no obvious pathology.  Palpation of the aorta revealed no calcifications precluding cannulation or cross-clamp.  I cannulated the aorta using a direct Seldinger's technique and CYNDIE guidance.    Next, I placed the venous cannula into the IVC via the right atrium.  Again this was confirmed with CYNDIE.  Next, I then placed a pursestring on the mid ascending aorta, through which I placed my antegrade needle.      With my cannulas in place, I then inspected the vein.  It was of reasonable quality.  It had 2 long areas of varicosity that required oversewing.  The distal end was spatulated and it was marked to help with orienting the graft.  Cardiopulmonary bypass was then initiated.    Once on full flow, I then retracted the heart to inspect my targets.  They are noted above.  These were each marked with the [place] blade.  I then dissected  out the IVC and brought the heart strep through the oblique sinus followed by running it through the transverse sinus.  The heart strep was used throughout the case to help position the heart.  Finally, I dissected the pulmonary artery away from the distal portion of the aorta to facilitate cross-clamp placement and complete isolation of the heart.  I then placed a cross-clamp and the heart was arrested.  Ice slush was placed on the heart to assist with cooling.    After I had arrest, the heart was repositioned to expose the PDA.  I dissected this out with the [place] blade.  I then opened it.  I found it had no real lumen.  Localized endarterectomy was performed clearing several millimeters distal to the area of anastomosis and proximally into the occluded distal right coronary artery.  This was sent for specimen.  An end vein to side artery long anastomosis was created with a running 7-0 Prolene suture.  The vein graft was connected to the cardioplegia system, and cardioplegia was given as I tied down the anastomosis.  There was excellent flow noted and good hemostasis.  Hand-injection of vein solution was performed to distend the vein graft as I brought it around the right atrium, to measure it to the aorta.  I then divided it proximally.  The proximal portion was then spatulated as well.  Cardioplegia was given down the aortic root, and I created an aortotomy using 11 blade scalpel and a 4 mm aortic punch.  The proximal anastomosis was then created using a running 6-0 Prolene suture in an end vein to side aorta fashion.  It was also marked with a coronary marker.    The heart was then repositioned to expose the OM1 branch.  I dissected this out with the [place] blade, and then opened it. An end vein to side artery anastomosis was created with a running 7-0 Prolene suture.  Upon completion, the vein graft was connected to the cardioplegia circuit and cardioplegia given as I tied down the anastomosis.  Excellent  flow was noted and there was good hemostasis.  I then used vein solution to distend the vein and measured out to the aorta.  I divided it proximally and spatulated it for creation of an another anastomosis.  The aorta was once again distended with cardioplegia, and I created an aortotomy with 11 blade scalpel and 4 mm punch.  An end vein to side aorta anastomosis was created with a running 6-0 Prolene suture.  Upon completion cardioplegia was given in both proximal anastomosis were found to be hemostatic.  The proximal was also marked with a coronary marker.  Rewarming of the patient was begun    The heart was repositioned again to expose the LAD.  I dissected this out with a [place] IRA blade and opened it with 11 blade scalpel.  I extended the arteriotomy with Foley scissors.  I then created a keyhole defect in the pericardium through which I brought my mammary pedicle.  I then sized it to the site of the anastomosis and divided mammary pedicle, discarding the distal portion.  I then fashioned the distal end of the mammary.  An end LIMA to side LAD anastomosis was created with a running 7-0 Prolene suture.  Upon completion, the pedicle was opened and flow was noted to run distal.  It was also noted to be hemostatic.  Bulldog clamp was reapplied and I tacked the pedicle to the epicardium with interrupted 6-0 Prolene sutures.    Patient was de-aired, flows dropped, and cross-clamp removed.  As the heart reperfused the placement of right ventricular pacing leads and my mediastinal chest tubes.  They were brought out through the epigastrium in the usual fashion.  Surgical sites were checked and found to be hemostatic.  The patient was then  from bypass in the usual fashion.  Venous cannula and aortic root vent were removed.  Protamine was administered to reverse systemic heparinization.  As the protamine was administered, the patient's blood volume in the pump was administered back to the aortic cannula.  Once  that was given back, that cannula was removed.  Sites were oversewn as needed.  Hemostasis was verified to be good.  Proceeded with closure of the sternum using interrupted #5 sternal wires.  The wound was then irrigated and closed in layers using absorbable sutures.  The patient tolerated the procedure well, all instrument counts were correct x2, and he was taken to the ICU in guarded condition.          1/18/2024 12:30 PM Arnaldo Bacon D.O.

## 2024-01-18 NOTE — PROGRESS NOTES
Patient arrive from OR just after 1300. Report received from Dr. Biggs anesthesiologist. Patient assessed, see flowsheet for findings and for vital signs/hemodynamics. Patient intrinsic rhythm and rate sinus rhythm 70s.  Pacemaker assessed, backup settings VVI HR 50, output 20 mA (threshold 13 mA), sensitivity 2.5 mV (threshold 9 mV).

## 2024-01-18 NOTE — ANESTHESIA PROCEDURE NOTES
Arterial Line    Performed by: Dale Biggs M.D.  Authorized by: Dale Biggs M.D.    Start Time:  1/18/2024 7:53 AM  End Time:  1/18/2024 7:55 AM  Localization: surface landmarks    Patient Location:  OR  Indication: continuous blood pressure monitoring        Catheter Size:  20 G  Seldinger Technique?: Yes    Laterality:  Left  Site:  Radial artery  Line Secured:  Antimicrobial disc, tape and transparent dressing  Events: patient tolerated procedure well with no complications

## 2024-01-18 NOTE — H&P
Pre op labs and imaging reviewed. No interval changes to H&P. Proceed with CABG x3, EVH, and CYNDIE.

## 2024-01-18 NOTE — ASSESSMENT & PLAN NOTE
Intubated intraoperatively 1/18/2024 -extubated per protocol same day  Encourage incentive spirometry, mobilization  Titrate oxygen therapy to goal SPO2 greater than 92%  RT/O2 protocol  Diuresis

## 2024-01-18 NOTE — ANESTHESIA PROCEDURE NOTES
Airway    Date/Time: 1/18/2024 7:45 AM    Performed by: Dale Biggs M.D.  Authorized by: Dale Biggs M.D.    Location:  OR  Urgency:  Elective  Indications for Airway Management:  Anesthesia      Spontaneous Ventilation: absent    Sedation Level:  Deep  Preoxygenated: Yes    Patient Position:  Sniffing  Mask Difficulty Assessment:  1 - vent by mask  Final Airway Type:  Endotracheal airway  Final Endotracheal Airway:  ETT  Cuffed: Yes    Technique Used for Successful ETT Placement:  Video laryngoscopy    Insertion Site:  Oral  Blade Type:  Glide  Laryngoscope Blade/Videolaryngoscope Blade Size:  3  ETT Size (mm):  7.5  Measured from:  Teeth  ETT to Teeth (cm):  23  Placement Verified by: auscultation and capnometry    Cormack-Lehane Classification:  Grade I - full view of glottis  Number of Attempts at Approach:  1

## 2024-01-18 NOTE — ANESTHESIA PROCEDURE NOTES
CYNDIE    Date/Time: 1/18/2024 8:18 AM    Performed by: Dale Biggs M.D.  Authorized by: Dale Biggs M.D.    Start Time:1/18/2024 8:18 AM  Preanesthetic Checklist: patient identified, IV checked, site marked, risks and benefits discussed, surgical consent, monitors and equipment checked, pre-op evaluation and timeout performed    Indication for CYNDIE: diagnostic   Patient Location: OR  Intubated: Yes  Bite Block: Yes  Heart Visualized: Yes  Insertion: easy    **See FULL CYNDIE report in patient's chart via CV Synapse**

## 2024-01-18 NOTE — PROGRESS NOTES
Armani Graham APRN updated on current patient condition, vital signs, hemodynamics, ventilator settings, urine and chest tube outputs, vasoactive infusions/PRN plasmalyte needs, and administration of PRN sodium bicarbonate for metabolic acidosis. Per Armani BARRIOS, after second liter of PRN Plasmalyte, ok to attempt to switch from epinephrine to norepinephrine, but given low EF during preop CYNDIE, may have to resume epinephrine infusion. Also per Armani BARRIOS, after second liter of Plasmalyte, utilze vasopressors to maintain MAP goal > 65, reach back out to APRN if CVP falls below 8.

## 2024-01-18 NOTE — ANESTHESIA PREPROCEDURE EVALUATION
Case: [id number] Date/Time: 01/18/24 0745    Procedures:       CORONARY ARTERY BYPASS GRAFT X3-4, ENDOSCOPIC VEIN HARVEST, TRANSESOPHAGEAL ECHOCARDIOGRAM      ECHOCARDIOGRAM, TRANSESOPHAGEAL, INTRAOPERATIVE    Pre-op diagnosis: CORANARY ARTERY DISEASE    Location: [facility] OR 03 / SURGERY [facility]    Surgeons: Arnaldo Bacon D.O.            Relevant Problems   CARDIAC   (positive) Angina pectoris (HCC)      GI   (positive) Peptic ulcer disease       Physical Exam    Airway   Mallampati: II  TM distance: >3 FB  Neck ROM: full       Cardiovascular - normal exam  Rhythm: regular  Rate: normal  (-) murmur     Dental - normal exam           Pulmonary - normal exam  Breath sounds clear to auscultation     Abdominal    Neurological - normal exam                   Anesthesia Plan    ASA 4   ASA physical status 4 criteria: other (comment)    Plan - general       Airway plan will be ETT    (Angina; good lv function. Cvp  chandler tiffanie)                Informed Consent:

## 2024-01-18 NOTE — ANESTHESIA TIME REPORT
Anesthesia Start and Stop Event Times       Date Time Event    1/18/2024 0701 Ready for Procedure     0734 Anesthesia Start     1311 Anesthesia Stop          Responsible Staff  01/18/24      Name Role Begin End    Dale Biggs M.D. Anesth 0734 1311          Overtime Reason:  no overtime (within assigned shift)    Comments:

## 2024-01-19 ENCOUNTER — HOSPITAL ENCOUNTER (OUTPATIENT)
Dept: RADIOLOGY | Facility: MEDICAL CENTER | Age: 69
End: 2024-01-19
Attending: NURSE PRACTITIONER
Payer: MEDICARE

## 2024-01-19 ENCOUNTER — APPOINTMENT (OUTPATIENT)
Dept: RADIOLOGY | Facility: MEDICAL CENTER | Age: 69
DRG: 236 | End: 2024-01-19
Attending: NURSE PRACTITIONER
Payer: MEDICARE

## 2024-01-19 LAB
ANION GAP SERPL CALC-SCNC: 7 MMOL/L (ref 7–16)
BUN SERPL-MCNC: 12 MG/DL (ref 8–22)
CALCIUM SERPL-MCNC: 7.5 MG/DL (ref 8.5–10.5)
CHLORIDE SERPL-SCNC: 102 MMOL/L (ref 96–112)
CO2 SERPL-SCNC: 28 MMOL/L (ref 20–33)
CREAT SERPL-MCNC: 0.61 MG/DL (ref 0.5–1.4)
EKG IMPRESSION: NORMAL
ERYTHROCYTE [DISTWIDTH] IN BLOOD BY AUTOMATED COUNT: 42.6 FL (ref 35.9–50)
GFR SERPLBLD CREATININE-BSD FMLA CKD-EPI: 104 ML/MIN/1.73 M 2
GLUCOSE BLD STRIP.AUTO-MCNC: 113 MG/DL (ref 65–99)
GLUCOSE BLD STRIP.AUTO-MCNC: 125 MG/DL (ref 65–99)
GLUCOSE BLD STRIP.AUTO-MCNC: 126 MG/DL (ref 65–99)
GLUCOSE BLD STRIP.AUTO-MCNC: 134 MG/DL (ref 65–99)
GLUCOSE BLD STRIP.AUTO-MCNC: 138 MG/DL (ref 65–99)
GLUCOSE BLD STRIP.AUTO-MCNC: 142 MG/DL (ref 65–99)
GLUCOSE BLD STRIP.AUTO-MCNC: 144 MG/DL (ref 65–99)
GLUCOSE BLD STRIP.AUTO-MCNC: 159 MG/DL (ref 65–99)
GLUCOSE BLD STRIP.AUTO-MCNC: 159 MG/DL (ref 65–99)
GLUCOSE BLD STRIP.AUTO-MCNC: 173 MG/DL (ref 65–99)
GLUCOSE BLD STRIP.AUTO-MCNC: 181 MG/DL (ref 65–99)
GLUCOSE SERPL-MCNC: 131 MG/DL (ref 65–99)
HCT VFR BLD AUTO: 34.5 % (ref 42–52)
HGB BLD-MCNC: 12 G/DL (ref 14–18)
MCH RBC QN AUTO: 30.5 PG (ref 27–33)
MCHC RBC AUTO-ENTMCNC: 34.8 G/DL (ref 32.3–36.5)
MCV RBC AUTO: 87.8 FL (ref 81.4–97.8)
PLATELET # BLD AUTO: 199 K/UL (ref 164–446)
PMV BLD AUTO: 9 FL (ref 9–12.9)
POTASSIUM SERPL-SCNC: 4.5 MMOL/L (ref 3.6–5.5)
POTASSIUM SERPL-SCNC: 4.5 MMOL/L (ref 3.6–5.5)
RBC # BLD AUTO: 3.93 M/UL (ref 4.7–6.1)
SODIUM SERPL-SCNC: 137 MMOL/L (ref 135–145)
WBC # BLD AUTO: 11.4 K/UL (ref 4.8–10.8)

## 2024-01-19 PROCEDURE — 93005 ELECTROCARDIOGRAM TRACING: CPT | Performed by: NURSE PRACTITIONER

## 2024-01-19 PROCEDURE — 700102 HCHG RX REV CODE 250 W/ 637 OVERRIDE(OP): Performed by: NURSE PRACTITIONER

## 2024-01-19 PROCEDURE — 82962 GLUCOSE BLOOD TEST: CPT | Mod: 91

## 2024-01-19 PROCEDURE — 700101 HCHG RX REV CODE 250: Performed by: NURSE PRACTITIONER

## 2024-01-19 PROCEDURE — 84132 ASSAY OF SERUM POTASSIUM: CPT

## 2024-01-19 PROCEDURE — 99233 SBSQ HOSP IP/OBS HIGH 50: CPT | Performed by: INTERNAL MEDICINE

## 2024-01-19 PROCEDURE — 770022 HCHG ROOM/CARE - ICU (200)

## 2024-01-19 PROCEDURE — 93010 ELECTROCARDIOGRAM REPORT: CPT | Performed by: INTERNAL MEDICINE

## 2024-01-19 PROCEDURE — 700111 HCHG RX REV CODE 636 W/ 250 OVERRIDE (IP): Mod: JZ | Performed by: NURSE PRACTITIONER

## 2024-01-19 PROCEDURE — 94669 MECHANICAL CHEST WALL OSCILL: CPT

## 2024-01-19 PROCEDURE — 80048 BASIC METABOLIC PNL TOTAL CA: CPT

## 2024-01-19 PROCEDURE — 99024 POSTOP FOLLOW-UP VISIT: CPT | Performed by: NURSE PRACTITIONER

## 2024-01-19 PROCEDURE — A9270 NON-COVERED ITEM OR SERVICE: HCPCS | Performed by: NURSE PRACTITIONER

## 2024-01-19 PROCEDURE — 700105 HCHG RX REV CODE 258: Performed by: NURSE PRACTITIONER

## 2024-01-19 PROCEDURE — 71045 X-RAY EXAM CHEST 1 VIEW: CPT

## 2024-01-19 PROCEDURE — 85027 COMPLETE CBC AUTOMATED: CPT

## 2024-01-19 RX ADMIN — CLOPIDOGREL BISULFATE 75 MG: 75 TABLET ORAL at 07:03

## 2024-01-19 RX ADMIN — ACETAMINOPHEN 1000 MG: 500 TABLET ORAL at 11:26

## 2024-01-19 RX ADMIN — ACETAMINOPHEN 1000 MG: 500 TABLET ORAL at 06:30

## 2024-01-19 RX ADMIN — TRAMADOL HYDROCHLORIDE 50 MG: 50 TABLET ORAL at 06:14

## 2024-01-19 RX ADMIN — MAGNESIUM SULFATE IN DEXTROSE 1 G: 10 INJECTION, SOLUTION INTRAVENOUS at 06:07

## 2024-01-19 RX ADMIN — ASPIRIN 81 MG: 81 TABLET, COATED ORAL at 07:03

## 2024-01-19 RX ADMIN — OXYCODONE HYDROCHLORIDE 5 MG: 5 TABLET ORAL at 04:37

## 2024-01-19 RX ADMIN — CALCIUM CHLORIDE 1000 MG: 100 INJECTION, SOLUTION INTRAVENOUS at 07:54

## 2024-01-19 RX ADMIN — OXYCODONE HYDROCHLORIDE 10 MG: 10 TABLET ORAL at 20:14

## 2024-01-19 RX ADMIN — DOCUSATE SODIUM 50 MG AND SENNOSIDES 8.6 MG 2 TABLET: 8.6; 5 TABLET, FILM COATED ORAL at 17:11

## 2024-01-19 RX ADMIN — ATORVASTATIN CALCIUM 40 MG: 40 TABLET, FILM COATED ORAL at 20:14

## 2024-01-19 RX ADMIN — SODIUM CHLORIDE: 9 INJECTION, SOLUTION INTRAVENOUS at 01:38

## 2024-01-19 RX ADMIN — OXYCODONE HYDROCHLORIDE 10 MG: 10 TABLET ORAL at 13:17

## 2024-01-19 RX ADMIN — OXYCODONE HYDROCHLORIDE 10 MG: 10 TABLET ORAL at 09:44

## 2024-01-19 RX ADMIN — CARVEDILOL 3.12 MG: 3.12 TABLET, FILM COATED ORAL at 17:11

## 2024-01-19 RX ADMIN — Medication 1 APPLICATOR: at 20:14

## 2024-01-19 RX ADMIN — ONDANSETRON 8 MG: 2 INJECTION INTRAMUSCULAR; INTRAVENOUS at 20:27

## 2024-01-19 RX ADMIN — Medication 1 APPLICATOR: at 09:44

## 2024-01-19 RX ADMIN — ALUMINUM HYDROXIDE, MAGNESIUM HYDROXIDE, AND DIMETHICONE 30 ML: 400; 400; 40 SUSPENSION ORAL at 20:31

## 2024-01-19 RX ADMIN — ACETAMINOPHEN 1000 MG: 500 TABLET ORAL at 20:14

## 2024-01-19 RX ADMIN — OXYCODONE HYDROCHLORIDE 5 MG: 5 TABLET ORAL at 17:12

## 2024-01-19 RX ADMIN — ACETAMINOPHEN 1000 MG: 500 TABLET ORAL at 00:43

## 2024-01-19 RX ADMIN — OMEPRAZOLE 20 MG: 20 CAPSULE, DELAYED RELEASE ORAL at 06:25

## 2024-01-19 RX ADMIN — DEXMEDETOMIDINE 0.8 MCG/KG/HR: 100 INJECTION, SOLUTION INTRAVENOUS at 01:37

## 2024-01-19 RX ADMIN — ENOXAPARIN SODIUM 40 MG: 100 INJECTION SUBCUTANEOUS at 17:13

## 2024-01-19 ASSESSMENT — COGNITIVE AND FUNCTIONAL STATUS - GENERAL
MOVING TO AND FROM BED TO CHAIR: A LITTLE
MOBILITY SCORE: 19
HELP NEEDED FOR BATHING: A LITTLE
SUGGESTED CMS G CODE MODIFIER MOBILITY: CK
DRESSING REGULAR LOWER BODY CLOTHING: A LITTLE
MOVING FROM LYING ON BACK TO SITTING ON SIDE OF FLAT BED: A LITTLE
DAILY ACTIVITIY SCORE: 21
DRESSING REGULAR UPPER BODY CLOTHING: A LITTLE
SUGGESTED CMS G CODE MODIFIER DAILY ACTIVITY: CJ
WALKING IN HOSPITAL ROOM: A LITTLE
STANDING UP FROM CHAIR USING ARMS: A LITTLE
CLIMB 3 TO 5 STEPS WITH RAILING: A LITTLE

## 2024-01-19 ASSESSMENT — COPD QUESTIONNAIRES
HAVE YOU SMOKED AT LEAST 100 CIGARETTES IN YOUR ENTIRE LIFE: NO/DON'T KNOW
DO YOU EVER COUGH UP ANY MUCUS OR PHLEGM?: NO/ONLY WITH OCCASIONAL COLDS OR INFECTIONS
DURING THE PAST 4 WEEKS HOW MUCH DID YOU FEEL SHORT OF BREATH: NONE/LITTLE OF THE TIME
COPD SCREENING SCORE: 2

## 2024-01-19 ASSESSMENT — LIFESTYLE VARIABLES
HOW MANY TIMES IN THE PAST YEAR HAVE YOU HAD 5 OR MORE DRINKS IN A DAY: 0
CONSUMPTION TOTAL: NEGATIVE
EVER HAD A DRINK FIRST THING IN THE MORNING TO STEADY YOUR NERVES TO GET RID OF A HANGOVER: NO
TOTAL SCORE: 0
AVERAGE NUMBER OF DAYS PER WEEK YOU HAVE A DRINK CONTAINING ALCOHOL: 0
EVER FELT BAD OR GUILTY ABOUT YOUR DRINKING: NO
TOTAL SCORE: 0
ALCOHOL_USE: YES
HAVE PEOPLE ANNOYED YOU BY CRITICIZING YOUR DRINKING: NO
DOES PATIENT WANT TO STOP DRINKING: NO
HAVE YOU EVER FELT YOU SHOULD CUT DOWN ON YOUR DRINKING: NO
TOTAL SCORE: 0
ON A TYPICAL DAY WHEN YOU DRINK ALCOHOL HOW MANY DRINKS DO YOU HAVE: 2

## 2024-01-19 ASSESSMENT — ENCOUNTER SYMPTOMS
FEVER: 0
MYALGIAS: 0
NERVOUS/ANXIOUS: 0
SHORTNESS OF BREATH: 0
CHILLS: 0
ABDOMINAL PAIN: 0
SPUTUM PRODUCTION: 0
BLURRED VISION: 0
INSOMNIA: 1
DEPRESSION: 0
HEADACHES: 0
SENSORY CHANGE: 0
BRUISES/BLEEDS EASILY: 0
PALPITATIONS: 0
COUGH: 1
NAUSEA: 1
VOMITING: 0
DIZZINESS: 0
SPEECH CHANGE: 0
BACK PAIN: 0
SORE THROAT: 0

## 2024-01-19 ASSESSMENT — PAIN DESCRIPTION - PAIN TYPE
TYPE: SURGICAL PAIN
TYPE: ACUTE PAIN;SURGICAL PAIN
TYPE: ACUTE PAIN;SURGICAL PAIN;OTHER (COMMENT)
TYPE: SURGICAL PAIN
TYPE: ACUTE PAIN;SURGICAL PAIN
TYPE: SURGICAL PAIN
TYPE: ACUTE PAIN;SURGICAL PAIN
TYPE: SURGICAL PAIN

## 2024-01-19 ASSESSMENT — PATIENT HEALTH QUESTIONNAIRE - PHQ9
2. FEELING DOWN, DEPRESSED, IRRITABLE, OR HOPELESS: NOT AT ALL
1. LITTLE INTEREST OR PLEASURE IN DOING THINGS: NOT AT ALL
SUM OF ALL RESPONSES TO PHQ9 QUESTIONS 1 AND 2: 0

## 2024-01-19 ASSESSMENT — FIBROSIS 4 INDEX: FIB4 SCORE: 1.69

## 2024-01-19 NOTE — PROGRESS NOTES
Repeat ABG at 1820: pH 7.4, PaCO2 42.6, PaO2 80, BE 2, HCO3 26.8, K 4.5, ionized calcium 1.13.  Ventilator switched to spontaneous mode at 1825.

## 2024-01-19 NOTE — PROGRESS NOTES
Levophed titrated down and stopped at 1000.  Arterial line removed. Jessica Chacon, DENIS updated. New goal SBP 90s-140s.

## 2024-01-19 NOTE — PROGRESS NOTES
Armani BARRIOS updated on current vital sighs/hemodynamics, vasopressor doses, fluid intake status, and chest tube output (low but drainage continues and no clots present, even after stripping), along with new pulsus paradoxus. Obtaining STAT chest xray, Armani BARRIOS aware

## 2024-01-19 NOTE — CARE PLAN
The patient is Watcher - Medium risk of patient condition declining or worsening    Shift Goals  Clinical Goals: Titrate off norepinephrine, edge of bed, pain control, pass swallow evaluation.  Patient Goals: Pain control, sleep.    Progress made toward(s) clinical / shift goals:      Problem: Day of surgery post CABG/Heart valve replacement  Goal: Stabilization in immediate post op period  Outcome: Progressing  Intervention: VS q 15 min x 4 hours, then q 1 hour. Include temperature immediately upon arrival. Check CO/CI q 2-4 hours and PRN  Note: Q hourly vitals taken and recorded.   Intervention: If radial artery used, elevate arm, no BP checks or needle sticks from affected arm, monitor ulnar pulse and capillary refill  Note: Left radial art line utilized. Left arm elevated, no BP checks or needle sticks. Pulse oximeter on left hand. Wrist stabilized via arm board. Hand warm with BCR.   Intervention: First post op hour labs and EKG per order  Note: Completed on day shift.  Intervention: Serum K q 6 hours x 24 hours.  ABG and CBC prn.  Note: Serum potassium drawn Q6 hours. Potassium replaced per protocol.  Intervention: Initiate post cardiac insulin infusion protocol orders for FSBS greater than 140 and check frequency per protocol  Note: Not initiated. Pt BG under 180 post-op. Blood glucose checked continuously.   Intervention: FSBS frequency as per Cardiac Surgery Insulin Drip Protocol  Note: N/A.  Intervention: Chest tube to 20 cm suction, record CT drainage with VS, and check for air leak  Note: Mediastinal chest tubes to -20 cm suction. Output recorded Q hour. No air leaks noted.   Intervention: Titrate and wean off vasoactive drips per patient's condition and per MD order while maintaining SBP  mmHg per MD order  Note: Titrating vasopressors to meet SBP goal of  mmHg. Unable to wean off at this time.   Intervention: VAP protocol in place  Note: Completed on day shift.  Intervention: Wean from Vent  per protocol (see protocol), extubation goal within 6 hours post op  Note: Completed on day shift.  Intervention: Bedrest until extubated and groin lines out  Note: Completed on day shift.  Intervention: Dangle within 4 hours post extubation  Note: Pt dangled at edge of bed one hour and 30 minutes post-extubation.  Intervention: Up in chair 4 hours, day of extubation  Note: N/A.  Intervention: Maintain all original surgical dressings per provider orders and specifications  Note: All original surgical dressings maintained.   Intervention: Clear liquids post extubation, order carbohydrate free (post cardiac surgery) diet, advance as tolerated  Note: Pt passed bedside RN swallow evaluation. Pt tolerating water well. Cardiac, CHO free diet ordered.   Intervention: A-Fib and DVT prophylaxis per MD order or contraindications documented (refer to DVT/VTE problem on Care Plan)  Note: SCD's in place while in bed during the night. TACHO hose ordered for ambulation and up to the chair in AM.     Problem: Pain - Standard  Goal: Alleviation of pain or a reduction in pain to the patient’s comfort goal  Outcome: Progressing  Note: PRN pain medication administered to help alleviate post-op pain in order for pt to fully participate in ADL's successfully. Pt tolerating well.

## 2024-01-19 NOTE — PROGRESS NOTES
"Cardiovascular Surgery Progress Note    Name: Joe Morelos  MRN: [id number]  : 1955  Admit Date: 2024  5:01 AM  1 Day Post-Op     Procedure:  Procedure(s) and Anesthesia Type:   CORONARY ARTERY BYPASS GRAFT X3 WITH SKELETONIZED REYES TO LAD, RSVG TO OM1, RSVG TO PDA  ENDARTERECTOMY- RIGHT CORONARY ARTERY - Wound Class: Clean with Drain  ENDOSCOPIC VEIN HARVEST OF LEFT GREATER SAPHENOUS VEIN- Wound Class: Clean with Drain  ECHOCARDIOGRAM, TRANSESOPHAGEAL, INTRAOPERATIVE - Wound Class: Clean Contaminated    Vitals:  Vitals:    24 0545 24 0600 24 0614 24 0615   BP: 98/64      Pulse: 76 79 78 78   Resp:       Temp:  36.2 °C (97.2 °F)     TempSrc:  Bladder     SpO2: 91% 93%  95%   Weight: 101 kg (222 lb 7.1 oz)      Height: 1.778 m (5' 10\")         Temp (24hrs), Av.3 °C (97.4 °F), Min:35.5 °C (95.9 °F), Max:36.9 °C (98.4 °F)      Respiratory:  Vent Mode: Spont, PEEP/CPAP: 8, PIP: 27, MAP: 13 Respiration: (!) 24, Pulse Oximetry: 95 %       Fluids:    Intake/Output Summary (Last 24 hours) at 2024 0639  Last data filed at 2024 0620  Gross per 24 hour   Intake 64041.72 ml   Output 3525 ml   Net 7359.72 ml     Admit weight: Weight: 93.4 kg (205 lb 14.6 oz)  Current weight: Weight: 101 kg (222 lb 7.1 oz) (24 0545)    Labs:  Recent Labs     24  1821 24  0130   WBC  --  11.4*   RBC  --  3.93*   HEMOGLOBIN 14.1 12.0*   HEMATOCRIT 40.7* 34.5*   MCV  --  87.8   MCH  --  30.5   MCHC  --  34.8   RDW  --  42.6   PLATELETCT 253 199   MPV  --  9.0     Recent Labs     24  2300 24  0130 24  0500   SODIUM  --  137  --    POTASSIUM 4.4 4.5 4.5   CHLORIDE  --  102  --    CO2  --  28  --    GLUCOSE  --  131*  --    BUN  --  12  --    CREATININE  --  0.61  --    CALCIUM  --  7.5*  --            HgbA1c:  6.0    Diabetic Educator Consult:  no    Medications:  Scheduled Medications   Medication Dose Frequency    insulin regular  0-14 Units Once    insulin " lispro  0-14 Units TID AC    enoxaparin (LOVENOX) injection  40 mg DAILY AT 1800    Nozin nasal  swab  1 Applicator BID    magnesium sulfate  1 g DAILY    K+ Scale: Goal of 4.5  1 Each Q6HRS    Pharmacy Consult Request  1 Each PHARMACY TO DOSE    acetaminophen  1,000 mg Q6HRS    senna-docusate  2 Tablet BID    And    polyethylene glycol/lytes  1 Packet DAILY    And    [START ON 1/20/2024] magnesium hydroxide  30 mL DAILY    omeprazole  20 mg DAILY    carvedilol  3.125 mg BID WITH MEALS    aspirin  81 mg DAILY    atorvastatin  40 mg QHS    clopidogrel  75 mg DAILY        Exam:   Physical Exam  Vitals and nursing note reviewed.   Constitutional:       General: He is not in acute distress.     Appearance: Normal appearance.   HENT:      Head: Normocephalic.      Right Ear: External ear normal.      Left Ear: External ear normal.      Nose: Nose normal. No congestion.      Mouth/Throat:      Mouth: Mucous membranes are moist.      Pharynx: Oropharynx is clear.   Eyes:      Extraocular Movements: Extraocular movements intact.      Pupils: Pupils are equal, round, and reactive to light.   Cardiovascular:      Rate and Rhythm: Normal rate and regular rhythm.      Pulses: Normal pulses.      Heart sounds: Normal heart sounds.   Pulmonary:      Effort: Pulmonary effort is normal.      Breath sounds: Decreased breath sounds present.   Abdominal:      General: Bowel sounds are decreased. There is no distension.      Palpations: Abdomen is soft.   Musculoskeletal:      Cervical back: Normal range of motion and neck supple. No tenderness.      Right lower leg: Edema present.      Left lower leg: Edema present.   Skin:     General: Skin is warm and dry.      Comments: Midline surgical incision, L evh site   Neurological:      General: No focal deficit present.      Mental Status: He is alert and oriented to person, place, and time. Mental status is at baseline.   Psychiatric:         Mood and Affect: Mood normal.          Behavior: Behavior normal.         Thought Content: Thought content normal.         Cardiac Medications:    ASA - Yes    Plavix - Yes    Post-operative Beta Blockers - Yes    Ace/ARB- No; contraindicated because of Normal EF    Statin - Yes    Aldactone- No; contraindicated because of Normal EF    SGLT2i-  No contraindicated because of No; contraindicated because of Normal EF    Ejection Fraction:  61%    Telemetry:   1/19 SR    Assessment/Plan:  POD 1  on norepi, SR, neuro intact, wounds intact, abdomen soft, fluid balance positive, wt up, 3 L NC. 280 out of chest tubes overnight.  Plan:  Keep chest tubes and pacing wires. 1 g CaCl. Diurese when able. IS/ambulate. CPM.    Disposition:  TBD

## 2024-01-19 NOTE — CARE PLAN
The patient is Watcher - Medium risk of patient condition declining or worsening    Shift Goals  Clinical Goals: SBP 90, Mobility, Pain Control   Patient Goals: Pain Control, Rest  Family Goals: Unable to Assess     Progress made toward(s) clinical / shift goals:  Met       Problem: Pain - Standard  Goal: Alleviation of pain or a reduction in pain to the patient’s comfort goal  Outcome: Progressing  Note: Pain assessed q4hrs and PRN.  Pain medications given per MAR. Reviewed pain goals with patient.       Problem: Post Op Day 1 CABG/Heart Valve Replacement  Goal: Optimal care of the post op CABG/heart valve replacement Post Op Day 1  Outcome: Not Progressing  Intervention: EKG and CXR completed   Note: Reviewed.   Intervention: All valve patients: PT/INR daily  Note: N/A   Intervention: Antibiotics are discontinued within 24 hours of anesthesia end time unless indication documented for continuation beyond 24 hours  Note: Confirmed   Intervention: Daily weights in the morning  Note: Confirmed   Intervention: Up in chair for all meals  Note: Up to chair for all meals.   Intervention: Ambulate in am if stable. First ambulation 25 feet. Repeat x 3 as tolerated  Note: Ambulated twice during shift. Longest distance once around unit.   Intervention: Discontinue perez catheter unless documented reason for continuation  Note: N/A   Intervention: Assess surgical dressing and check provider orders for potential removal  Note: Midline dressing and L EVH dressing removed 24 hours post out of room time. Incision care provided.   Intervention: [facility] trained RN to remove chest tubes if ordered by provider  Note: Chest tubes removed at 1645.  Refer to note.   Intervention: IS q 1 hour while awake and record best IS volume  Note: Best IS 1000.   Intervention: Knee high TACHO hose, on during the day, off at night  Note: TACHO hose on   Intervention: Saline lock IV  Note: Completed.   Intervention: After 24th hour post-anesthesia end time,  transition patient to Cardiac Surgery SQ Insulin Protocol  Note: Patient transitioned per protocol.   Intervention: If patient is CABG or on home beta-blocker, start/resume beta-blocker on POD 1 or POD 2 or document contraindication  Note: Confirmed.

## 2024-01-19 NOTE — PROGRESS NOTES
ABG showing worsened metabolic acidosis, pH 7.301, PaCO2 31.5, PaO2 89, BE -10, HCO3 15.5. Armani BARRIOS updated. Per Armani BARRIOS, new order for 100 mEq sodium bicarbonate as opposed to 50 mEq PRN order per protocol.

## 2024-01-19 NOTE — PROGRESS NOTES
Patient has been 100% spont on ventilator for over 30 minutes. Last ABG normalized, see previous progress note for detail. SpO2 96%, RR 12, FiO2 30%, PEEP 8, RSBI 14, Pinsp 15, NIF -30, MV 10.3L , FVC 1116 mL.  Cuff leak present. Patient extubated at 1845 to 3L NC. No stridor noted. SpO2 94%.

## 2024-01-19 NOTE — HOSPITAL COURSE
68 y.o. male who presented 1/18/2024 with a past medical history significant for hypertension and hyperlipidemia, CAD and peptic ulcer disease who underwent cardiac catheterization on 12/21 after having a positive cardiac stress test while being worked up for stable angina.  He was found to have severe three-vessel disease with mildly elevated LVEDP of 18 mmHg and cardiothoracic surgery was consulted.  They took him to the operating room today for an elective three-vessel CABG with Dr. Bacon.  Procedure was performed without complication and the following vessels were bypassed: LIMA to LAD, RSVG to OM1, R SVG to PDA, right coronary artery endarterectomy and CYNDIE              Per anesthesiologist report at bedside: Patient had an easy airway and was intubated with a 7.5 endotracheal tube at 23 cm at the teeth with a grade 1 view.  A right IJ central venous catheter and a left radial arterial catheter were placed.  Patient received 600 cc of Cell Saver, 2 L of crystalloid and had a 700 mL urine output during the case.  He is on a Precedex drip at 0.4 and an epinephrine infusion at 0.03.  He had a depressed ejection fraction of 35% post anesthesia induction which improved to normal post bypass.  He was noted to have a functional bicuspid aortic valve on CYNDIE without a gradient across the valve.  He was given 20 of methadone and his last glucose was 105.  No pacing was required post bypass.

## 2024-01-19 NOTE — PROGRESS NOTES
Monitor Summary:    Sinus Rhythm: 69-89 bpm. No ectopy noted.     0.169/0.076/0.361    (Image not available for upload)

## 2024-01-19 NOTE — PROGRESS NOTES
Bedside report received from TISHA Bui and patient care assumed at 0645. Lines and infusions verified. Assessment completed.

## 2024-01-19 NOTE — PROGRESS NOTES
-170s between [id number] since returning from OR, then 181 at 1600. Per Dr. Gonda, ok to recheck at 1700, and if greater than 180, begin insulin infusion then.

## 2024-01-19 NOTE — PROGRESS NOTES
Critical Care Progress Note    Date of admission  1/18/2024    Chief Complaint  68 y.o. male admitted 1/18/2024 with CAD    Hospital Course  68 y.o. male who presented 1/18/2024 with a past medical history significant for hypertension and hyperlipidemia, CAD and peptic ulcer disease who underwent cardiac catheterization on 12/21 after having a positive cardiac stress test while being worked up for stable angina.  He was found to have severe three-vessel disease with mildly elevated LVEDP of 18 mmHg and cardiothoracic surgery was consulted.  They took him to the operating room today for an elective three-vessel CABG with Dr. Bacon.  Procedure was performed without complication and the following vessels were bypassed: LIMA to LAD, RSVG to OM1, R SVG to PDA, right coronary artery endarterectomy and CYNDIE              Per anesthesiologist report at bedside: Patient had an easy airway and was intubated with a 7.5 endotracheal tube at 23 cm at the teeth with a grade 1 view.  A right IJ central venous catheter and a left radial arterial catheter were placed.  Patient received 600 cc of Cell Saver, 2 L of crystalloid and had a 700 mL urine output during the case.  He is on a Precedex drip at 0.4 and an epinephrine infusion at 0.03.  He had a depressed ejection fraction of 35% post anesthesia induction which improved to normal post bypass.  He was noted to have a functional bicuspid aortic valve on CYNDIE without a gradient across the valve.  He was given 20 of methadone and his last glucose was 105.  No pacing was required post bypass.    Interval Problem Update  Reviewed last 24 hour events:   - extubated per protocol   - remains on levophed gtt   - CVP 12-14   - weaned off precedex gtt this morning   - AF, WBC mildly elevated   - NSR, SBP    - Hgb down to 12    Review of Systems  Review of Systems   Constitutional:  Negative for chills, fever and malaise/fatigue.   HENT:  Negative for congestion and sore throat.     Eyes:  Negative for blurred vision.   Respiratory:  Positive for cough. Negative for sputum production and shortness of breath.    Cardiovascular:  Positive for chest pain. Negative for palpitations and leg swelling.   Gastrointestinal:  Positive for nausea. Negative for abdominal pain and vomiting.   Genitourinary:  Negative for dysuria.   Musculoskeletal:  Negative for back pain and myalgias.   Skin:  Negative for rash.   Neurological:  Negative for dizziness, sensory change, speech change and headaches.   Endo/Heme/Allergies:  Does not bruise/bleed easily.   Psychiatric/Behavioral:  Negative for depression. The patient has insomnia. The patient is not nervous/anxious.    All other systems reviewed and are negative.       Vital Signs for last 24 hours   Temp:  [35.5 °C (95.9 °F)-36.9 °C (98.4 °F)] 36.2 °C (97.2 °F)  Pulse:  [69-92] 78  Resp:  [9-38] 24  BP: ()/(51-78) 98/64  SpO2:  [91 %-99 %] 95 %    Hemodynamic parameters for last 24 hours  CVP:  [1 MM HG-49 MM HG] 12 MM HG    Respiratory Information for the last 24 hours  Vent Mode: Spont  PEEP/CPAP: 8  P Support: 5  MAP: 13  Control VTE (exp VT): 823 --> extubated 1/18 to 3 lpm n/c    Physical Exam   Physical Exam  Vitals and nursing note reviewed.   Constitutional:       General: He is awake. He is not in acute distress.     Appearance: He is well-developed and overweight.      Interventions: Nasal cannula in place.      Comments: Sitting up in chair this morning   HENT:      Head: Normocephalic.      Nose: Nose normal. No congestion.      Mouth/Throat:      Mouth: Mucous membranes are moist.      Pharynx: Oropharynx is clear.   Eyes:      General: No scleral icterus.     Conjunctiva/sclera: Conjunctivae normal.      Pupils: Pupils are equal, round, and reactive to light.   Neck:      Comments: Right IJ central venous catheter without surrounding hematoma  Cardiovascular:      Rate and Rhythm: Normal rate and regular rhythm. Occasional Extrasystoles  are present.     Chest Wall: PMI is displaced.      Pulses: No decreased pulses.      Heart sounds: Heart sounds are distant. No murmur heard.     Comments: Sternotomy incision is clean/dry/intact with mediastinal chest tubes and pacing wires in place  Pulmonary:      Effort: No tachypnea, accessory muscle usage or respiratory distress.      Breath sounds: No stridor. Examination of the right-lower field reveals rales. Examination of the left-lower field reveals rales. Rales present. No wheezing or rhonchi.      Comments: Speaking in full sentences without difficulty  Abdominal:      General: Bowel sounds are normal. There is no distension.      Palpations: Abdomen is soft.      Tenderness: There is no abdominal tenderness. There is no guarding or rebound.   Genitourinary:     Comments: Lynn catheter in place  Musculoskeletal:      Cervical back: Neck supple. No rigidity.      Right lower leg: No edema.      Left lower leg: No edema.   Skin:     General: Skin is warm and dry.      Capillary Refill: Capillary refill takes less than 2 seconds.      Coloration: Skin is not pale.   Neurological:      General: No focal deficit present.      Mental Status: He is alert and oriented to person, place, and time.      Cranial Nerves: No cranial nerve deficit.   Psychiatric:         Mood and Affect: Mood normal.         Behavior: Behavior normal. Behavior is cooperative.         Medications  Current Facility-Administered Medications   Medication Dose Route Frequency Provider Last Rate Last Admin    calcium CHLORIDE 10 % 1,000 mg in dextrose 5% 100 mL IVPB  1,000 mg Intravenous Once DEBBIE Topete.P.R.N.        insulin regular (HumuLIN R,NovoLIN R) injection  0-14 Units Intravenous Once SHAZIA TopeteP.R.N.        insulin lispro (HumaLOG,AdmeLOG) injection  0-14 Units Subcutaneous TID AC SHAZIA TopeteP.R.N.        insulin regular (Humulin R) 100 Units in  mL Infusion  0-29 Units/hr Intravenous Continuous  ABBY TopeteRTEZ   Held at 01/18/24 1330    dextrose 10 % BOLUS 12.5-25 g  12.5-25 g Intravenous PRN ODALYS Topete MD Alert...Pharmacy to initiate transition from insulin infusion to subcutaneous insulin for cardiothoracic surgery 1 Each  1 Each Other Continuous ODALYS Topete        Respiratory Therapy Consult   Nebulization Continuous RT ODALYS Topete        NS infusion   Intravenous Continuous ESTEFANÍA Topete. 10 mL/hr at 01/18/24 2051 Restarted at 01/18/24 2051    NS infusion   Intravenous Continuous ODALYS Topete   Stopped at 01/19/24 0447    electrolyte-A (Plasmalyte-A) infusion   Intravenous PRN ODALYS Topete   Stopped at 01/18/24 1708    enoxaparin (Lovenox) inj 40 mg  40 mg Subcutaneous DAILY AT 1800 ODALYS Topete        Nozin nasal  swab  1 Applicator Each Nostril BID ESTEFANÍA Topete.   1 Applicator at 01/18/24 2018    calcium CHLORIDE 10 % 1,000 mg in  mL IVPB  1,000 mg Intravenous Once PRN ABBY TopeteRTrevonN. 200 mL/hr at 01/18/24 1429 1,000 mg at 01/18/24 1429    magnesium sulfate in D5W IVPB premix 1 g  1 g Intravenous DAILY SHAZIA TopetePTrevonRTrevonN. 100 mL/hr at 01/19/24 0607 1 g at 01/19/24 0607    K+ Scale: Goal of 4.5  1 Each Intravenous Q6HRS ABBY TopeteR.N.   1 Each at 01/18/24 1330    clevidipine (Cleviprex) IV emulsion  0-21 mg/hr Intravenous Continuous ABBY TopeteRTrevonN.   Held at 01/18/24 1345    nitroglycerin 50 mg in D5W 250 ml infusion  0-100 mcg/min Intravenous Continuous ODALYS Topete   Held at 01/18/24 1345    Pharmacy Consult Request ...Pain Management Review 1 Each  1 Each Other PHARMACY TO DOSE ODALYS Topete        acetaminophen (Tylenol) tablet 1,000 mg  1,000 mg Oral Q6HRS ODALYS Topete   1,000 mg at 01/19/24 0630    Followed by    [START ON 1/28/2024] acetaminophen (Tylenol) tablet 1,000  mg  1,000 mg Oral Q6HRS PRN Shani Ribeiro, A.P.R.N.        oxyCODONE immediate-release (Roxicodone) tablet 5 mg  5 mg Oral Q3HRS PRN Shani Ribeiro, A.P.R.N.   5 mg at 01/19/24 0437    Or    oxyCODONE immediate release (Roxicodone) tablet 10 mg  10 mg Oral Q3HRS PRN Shani Ribeiro, A.P.R.N.        Or    fentaNYL (Sublimaze) injection 50 mcg  50 mcg Intravenous Q3HRS PRN Shani Ribeiro, A.P.R.N.        traMADol (Ultram) 50 MG tablet 50 mg  50 mg Oral Q4HRS PRN Shani Ribeiro, A.P.R.N.   50 mg at 01/19/24 0614    midazolam (Versed) injection 2 mg  2 mg Intravenous Q HOUR PRN Shani Ribeiro, A.P.R.N.   2 mg at 01/18/24 1625    dexmedetomidine (PRECEDEX) 400 mcg/100mL NS premix infusion  0-1.5 mcg/kg/hr (Ideal) Intravenous Continuous Shani Ribeiro A.P.R.N.   Stopped at 01/19/24 0407    sodium bicarbonate 8.4 % injection 50 mEq  50 mEq Intravenous Q HOUR PRN Shani Ribeiro, A.P.R.N.   50 mEq at 01/18/24 1626    morphine 4 MG/ML injection 4 mg  4 mg Intravenous Q HOUR PRN Shani Ribeiro, A.P.R.N.   4 mg at 01/18/24 1345    ondansetron (Zofran) syringe/vial injection 8 mg  8 mg Intravenous Q6HRS PRN Shani Ribeiro, A.P.R.N.   8 mg at 01/18/24 1854    Or    prochlorperazine (Compazine) injection 10 mg  10 mg Intravenous Q6HRS PRN Shani Ribeiro, A.P.R.N.        senna-docusate (Pericolace Or Senokot S) 8.6-50 MG per tablet 2 Tablet  2 Tablet Oral BID Shani Ribeiro A.P.R.N.        And    polyethylene glycol/lytes (Miralax) Packet 1 Packet  1 Packet Oral DAILY SHAZIA TopetePTrevonRTEZ        And    [START ON 1/20/2024] magnesium hydroxide (Milk Of Magnesia) suspension 30 mL  30 mL Oral DAILY SHAZIA TopeteP.RTEZ        And    bisacodyl (Dulcolax) suppository 10 mg  10 mg Rectal QDAY PRN SHAZIA TopetePTrevonRTrevonN.        omeprazole (PriLOSEC) capsule 20 mg  20 mg Oral DAILY DEBBIE Topete.P.R.N.   20 mg at 01/19/24 0625    mag hydrox-al hydrox-simeth (Maalox Plus Es Or Mylanta Ds)  suspension 30 mL  30 mL Oral Q4HRS PRN SHAZIA TopetePTrevonRTEZ        diphenhydrAMINE (Benadryl) tablet/capsule 25 mg  25 mg Oral HS PRN - MR X 1 SHAZIA TopteePTrevonRTEZ        carvedilol (Coreg) tablet 3.125 mg  3.125 mg Oral BID WITH MEALS ABBY TopeteRTEZ        aspirin EC tablet 81 mg  81 mg Oral DAILY SHAZIA TopeteP.RTEZ        atorvastatin (Lipitor) tablet 40 mg  40 mg Oral QHS SHAZIA TopetePTrevonRTEZ   40 mg at 01/18/24 2009    clopidogrel (Plavix) tablet 75 mg  75 mg Oral DAILY SHAZIA TopetePTrevonRTEZ        norepinephrine (Levophed) 8 mg in 250 mL NS infusion (premix)  0-1 mcg/kg/min (Ideal) Intravenous Continuous SHAZIA TopetePTrevonRTEZ 4.1 mL/hr at 01/19/24 0629 0.03 mcg/kg/min at 01/19/24 0629    EPINEPHrine (Adrenalin) infusion 4 mg/250 mL (premix)  0-0.5 mcg/kg/min (Ideal) Intravenous Continuous SHAZIA TopetePTrevonRTEZ   Stopped at 01/18/24 1702       Fluids    Intake/Output Summary (Last 24 hours) at 1/19/2024 0649  Last data filed at 1/19/2024 0620  Gross per 24 hour   Intake 54139.72 ml   Output 3525 ml   Net 7359.72 ml       Laboratory  Recent Labs     01/18/24  1606 01/18/24  1716 01/18/24  1823   ISTATAPH 7.301* 7.301* 7.407   ISTATAPCO2 42.8* 31.5 42.6*   ISTATAPO2 75 89* 80   ISTATATCO2 22 16* 28   FDCFPSH9FQC 93 96 96   ISTATARTHCO3 21.1 15.5* 26.8*   ISTATARTBE -5* -10* 2   ISTATTEMP 36.0 C 36.0 C 36.9 C   ISTATFIO2 50 30 30   ISTATSPEC Arterial Arterial Arterial   ISTATAPHTC 7.315* 7.315* 7.408   USMXYVTW0IR 70 84 80         Recent Labs     01/18/24  1705 01/18/24  1821 01/18/24  2300 01/19/24  0130 01/19/24  0500   SODIUM  --   --   --  137  --    POTASSIUM  --    < > 4.4 4.5 4.5   CHLORIDE  --   --   --  102  --    CO2  --   --   --  28  --    BUN  --   --   --  12  --    CREATININE  --   --   --  0.61  --    MAGNESIUM 1.9  --   --   --   --    CALCIUM  --   --   --  7.5*  --     < > = values in this interval not displayed.     Recent Labs      01/19/24  0130   GLUCOSE 131*     Recent Labs     01/19/24  0130   WBC 11.4*     Recent Labs     01/18/24  1821 01/19/24  0130   RBC  --  3.93*   HEMOGLOBIN 14.1 12.0*   HEMATOCRIT 40.7* 34.5*   PLATELETCT 253 199       Imaging  X-Ray:  I have personally reviewed the images and compared with prior images. and My impression is: Unchanged bibasilar atelectasis with mild edema pattern, large cardiac silhouette, sternal wires, right IJ central venous catheter mediastinal chest tubes are in good position    Assessment/Plan  * Atherosclerosis of native coronary artery of native heart with angina pectoris ([facility])  Assessment & Plan  Status post three-vessel CABG with RCA endarterectomy with intraoperative CYNDIE 1/18/2024  Continue routine postoperative management  Monitor mediastinal chest tube output (possible removal today), monitor for arrhythmias  Strict blood pressure management  As needed analgesics  Recommend antiplatelet when appropriate, statin  S/p norepinephrine drip  Transition off insulin infusion for tight glycemic control in the post-operative setting    Encounter for weaning from ventilator ([facility])  Assessment & Plan  Intubated intraoperatively 1/18/2024 -extubated per protocol same day  Encourage incentive spirometry, mobilization  Titrate oxygen therapy to goal SPO2 greater than 92%  RT/O2 protocol  Diuresis    Obesity (BMI 30-39.9)- (present on admission)  Assessment & Plan  Encourage behavioral and dietary modification for weight loss    Elevated blood pressure reading in office without diagnosis of hypertension- (present on admission)  Assessment & Plan  BP controlled currently  Eventual beta-blocker    Dyslipidemia- (present on admission)  Assessment & Plan  Continue atorvastatin    Acute blood loss as cause of postoperative anemia  Assessment & Plan  Daily CBC with conservative transfusion strategy  Monitor for bleeding through chest tube         VTE:  Lovenox  Ulcer: Not Indicated  Lines: Central Line   Ongoing indication addressed and Lynn Catheter  to be removed today    I have performed a physical exam and reviewed and updated ROS and Plan today (1/19/2024). In review of yesterday's note (1/18/2024), there are no changes except as documented above.     Discussed patient condition and risk of morbidity and/or mortality with RN, RT, Pharmacy, Charge nurse / hot rounds, Patient, and [facility]. Critical care services will sign off at this time.  Please call with any questions or concerns.    Please note that this dictation was created using voice recognition software. I have made every reasonable attempt to correct obvious errors, but there may be errors of grammar and possibly content that I did not discover before finalizing the note.

## 2024-01-19 NOTE — THERAPY
Occupational Therapy Contact Note    Patient Name: Joe Morelos  Age:  68 y.o., Sex:  male  Medical Record #: [id number]  Today's Date: 1/19/2024    OT consult received and acknowledged, per EMR pt POD 1 [facility] will hold and follow up POD2 as able/appropriate for OT evaluation per protocol.    Jone Forrest OTD, OTR/L

## 2024-01-19 NOTE — PROGRESS NOTES
Midnight EKG obtained. Nee ST elevation noted. Suspicious for pericarditis. VSS, unchanged. No new c/o chest pain. DENIS Haley updated.

## 2024-01-19 NOTE — DIETARY
"Nutrition Services: Day 1 of admit.  Joe Morelos is a 68 y.o. male with admitting DX of Atherosclerotic heart disease of native coronary artery without angina pectoris.    Consult received for Cardiac Rehab Diet Education.  S/p CABG x3 and R endarterectomy 1/18.    H/o DLD (2021)  Pre-op BMI: 31.16  HA1c 6.0%  Lipid panel 2/2023: ,     Visited pt for diet education consult this afternoon. Pt said he generally eats a healthy diet and maintains a good level of physical activity. Pt has recently cut \"sweets\" and coffee out of his normal diet. Pt said his post-op appetite is poor. RD offered nutrition supplements to promote healing. Pt receptive. Provided packet of pertinent nutrition therapy handouts for pt to take home. No additional RD needs identified.   "

## 2024-01-20 LAB
ANION GAP SERPL CALC-SCNC: 5 MMOL/L (ref 7–16)
BUN SERPL-MCNC: 15 MG/DL (ref 8–22)
CALCIUM SERPL-MCNC: 8.3 MG/DL (ref 8.5–10.5)
CHLORIDE SERPL-SCNC: 93 MMOL/L (ref 96–112)
CO2 SERPL-SCNC: 31 MMOL/L (ref 20–33)
CREAT SERPL-MCNC: 0.61 MG/DL (ref 0.5–1.4)
ERYTHROCYTE [DISTWIDTH] IN BLOOD BY AUTOMATED COUNT: 43.8 FL (ref 35.9–50)
GFR SERPLBLD CREATININE-BSD FMLA CKD-EPI: 104 ML/MIN/1.73 M 2
GLUCOSE BLD STRIP.AUTO-MCNC: 105 MG/DL (ref 65–99)
GLUCOSE BLD STRIP.AUTO-MCNC: 113 MG/DL (ref 65–99)
GLUCOSE BLD STRIP.AUTO-MCNC: 119 MG/DL (ref 65–99)
GLUCOSE BLD STRIP.AUTO-MCNC: 138 MG/DL (ref 65–99)
GLUCOSE SERPL-MCNC: 125 MG/DL (ref 65–99)
HCT VFR BLD AUTO: 32.8 % (ref 42–52)
HGB BLD-MCNC: 11.3 G/DL (ref 14–18)
MCH RBC QN AUTO: 30.5 PG (ref 27–33)
MCHC RBC AUTO-ENTMCNC: 34.5 G/DL (ref 32.3–36.5)
MCV RBC AUTO: 88.4 FL (ref 81.4–97.8)
PLATELET # BLD AUTO: 199 K/UL (ref 164–446)
PMV BLD AUTO: 9 FL (ref 9–12.9)
POTASSIUM SERPL-SCNC: 4 MMOL/L (ref 3.6–5.5)
RBC # BLD AUTO: 3.71 M/UL (ref 4.7–6.1)
SODIUM SERPL-SCNC: 129 MMOL/L (ref 135–145)
WBC # BLD AUTO: 15.9 K/UL (ref 4.8–10.8)

## 2024-01-20 PROCEDURE — 700111 HCHG RX REV CODE 636 W/ 250 OVERRIDE (IP): Performed by: NURSE PRACTITIONER

## 2024-01-20 PROCEDURE — 700102 HCHG RX REV CODE 250 W/ 637 OVERRIDE(OP): Performed by: NURSE PRACTITIONER

## 2024-01-20 PROCEDURE — 700111 HCHG RX REV CODE 636 W/ 250 OVERRIDE (IP): Mod: JZ | Performed by: NURSE PRACTITIONER

## 2024-01-20 PROCEDURE — 94669 MECHANICAL CHEST WALL OSCILL: CPT

## 2024-01-20 PROCEDURE — A9270 NON-COVERED ITEM OR SERVICE: HCPCS | Performed by: NURSE PRACTITIONER

## 2024-01-20 PROCEDURE — 770020 HCHG ROOM/CARE - TELE (206)

## 2024-01-20 PROCEDURE — 82962 GLUCOSE BLOOD TEST: CPT | Mod: 91

## 2024-01-20 PROCEDURE — 85027 COMPLETE CBC AUTOMATED: CPT

## 2024-01-20 PROCEDURE — 99024 POSTOP FOLLOW-UP VISIT: CPT | Performed by: NURSE PRACTITIONER

## 2024-01-20 PROCEDURE — 80048 BASIC METABOLIC PNL TOTAL CA: CPT

## 2024-01-20 RX ORDER — POTASSIUM CHLORIDE 20 MEQ/1
20 TABLET, EXTENDED RELEASE ORAL 2 TIMES DAILY
Status: DISCONTINUED | OUTPATIENT
Start: 2024-01-20 | End: 2024-01-22

## 2024-01-20 RX ORDER — FUROSEMIDE 10 MG/ML
40 INJECTION INTRAMUSCULAR; INTRAVENOUS 2 TIMES DAILY
Status: DISCONTINUED | OUTPATIENT
Start: 2024-01-20 | End: 2024-01-23 | Stop reason: HOSPADM

## 2024-01-20 RX ADMIN — POLYETHYLENE GLYCOL 3350 1 PACKET: 17 POWDER, FOR SOLUTION ORAL at 06:24

## 2024-01-20 RX ADMIN — OXYCODONE HYDROCHLORIDE 10 MG: 10 TABLET ORAL at 06:26

## 2024-01-20 RX ADMIN — ASPIRIN 81 MG: 81 TABLET, COATED ORAL at 06:25

## 2024-01-20 RX ADMIN — PROCHLORPERAZINE EDISYLATE 10 MG: 5 INJECTION INTRAMUSCULAR; INTRAVENOUS at 17:48

## 2024-01-20 RX ADMIN — POTASSIUM CHLORIDE 20 MEQ: 20 TABLET, EXTENDED RELEASE ORAL at 08:59

## 2024-01-20 RX ADMIN — FUROSEMIDE 40 MG: 10 INJECTION, SOLUTION INTRAMUSCULAR; INTRAVENOUS at 17:44

## 2024-01-20 RX ADMIN — Medication 1 APPLICATOR: at 08:01

## 2024-01-20 RX ADMIN — OMEPRAZOLE 20 MG: 20 CAPSULE, DELAYED RELEASE ORAL at 06:28

## 2024-01-20 RX ADMIN — ONDANSETRON 8 MG: 2 INJECTION INTRAMUSCULAR; INTRAVENOUS at 06:26

## 2024-01-20 RX ADMIN — POTASSIUM CHLORIDE 20 MEQ: 20 TABLET, EXTENDED RELEASE ORAL at 17:59

## 2024-01-20 RX ADMIN — ONDANSETRON 8 MG: 2 INJECTION INTRAMUSCULAR; INTRAVENOUS at 15:48

## 2024-01-20 RX ADMIN — DOCUSATE SODIUM 50 MG AND SENNOSIDES 8.6 MG 2 TABLET: 8.6; 5 TABLET, FILM COATED ORAL at 17:58

## 2024-01-20 RX ADMIN — MAGNESIUM SULFATE IN DEXTROSE 1 G: 10 INJECTION, SOLUTION INTRAVENOUS at 06:39

## 2024-01-20 RX ADMIN — ACETAMINOPHEN 1000 MG: 500 TABLET ORAL at 12:40

## 2024-01-20 RX ADMIN — ACETAMINOPHEN 1000 MG: 500 TABLET ORAL at 08:02

## 2024-01-20 RX ADMIN — OXYCODONE HYDROCHLORIDE 5 MG: 5 TABLET ORAL at 10:30

## 2024-01-20 RX ADMIN — DOCUSATE SODIUM 50 MG AND SENNOSIDES 8.6 MG 2 TABLET: 8.6; 5 TABLET, FILM COATED ORAL at 06:25

## 2024-01-20 RX ADMIN — FUROSEMIDE 40 MG: 10 INJECTION, SOLUTION INTRAMUSCULAR; INTRAVENOUS at 08:59

## 2024-01-20 RX ADMIN — Medication 1 APPLICATOR: at 20:54

## 2024-01-20 RX ADMIN — ATORVASTATIN CALCIUM 40 MG: 40 TABLET, FILM COATED ORAL at 20:54

## 2024-01-20 RX ADMIN — ENOXAPARIN SODIUM 40 MG: 100 INJECTION SUBCUTANEOUS at 17:44

## 2024-01-20 RX ADMIN — CARVEDILOL 3.12 MG: 3.12 TABLET, FILM COATED ORAL at 08:01

## 2024-01-20 RX ADMIN — CLOPIDOGREL BISULFATE 75 MG: 75 TABLET ORAL at 06:26

## 2024-01-20 RX ADMIN — ACETAMINOPHEN 1000 MG: 500 TABLET ORAL at 02:15

## 2024-01-20 RX ADMIN — ACETAMINOPHEN 1000 MG: 500 TABLET ORAL at 18:01

## 2024-01-20 RX ADMIN — MAGNESIUM HYDROXIDE 30 ML: 1200 LIQUID ORAL at 06:24

## 2024-01-20 RX ADMIN — CARVEDILOL 3.12 MG: 3.12 TABLET, FILM COATED ORAL at 18:00

## 2024-01-20 RX ADMIN — OXYCODONE HYDROCHLORIDE 5 MG: 5 TABLET ORAL at 00:04

## 2024-01-20 ASSESSMENT — COGNITIVE AND FUNCTIONAL STATUS - GENERAL
STANDING UP FROM CHAIR USING ARMS: A LITTLE
SUGGESTED CMS G CODE MODIFIER DAILY ACTIVITY: CJ
DAILY ACTIVITIY SCORE: 21
DRESSING REGULAR LOWER BODY CLOTHING: A LITTLE
WALKING IN HOSPITAL ROOM: A LITTLE
MOBILITY SCORE: 18
MOVING FROM LYING ON BACK TO SITTING ON SIDE OF FLAT BED: A LITTLE
CLIMB 3 TO 5 STEPS WITH RAILING: A LITTLE
MOVING TO AND FROM BED TO CHAIR: A LITTLE
HELP NEEDED FOR BATHING: A LITTLE
SUGGESTED CMS G CODE MODIFIER MOBILITY: CK
TURNING FROM BACK TO SIDE WHILE IN FLAT BAD: A LITTLE
DRESSING REGULAR UPPER BODY CLOTHING: A LITTLE

## 2024-01-20 ASSESSMENT — FIBROSIS 4 INDEX
FIB4 SCORE: 1.69
FIB4 SCORE: 1.69

## 2024-01-20 ASSESSMENT — PAIN DESCRIPTION - PAIN TYPE
TYPE: SURGICAL PAIN
TYPE: SURGICAL PAIN
TYPE: ACUTE PAIN;SURGICAL PAIN
TYPE: SURGICAL PAIN
TYPE: ACUTE PAIN;SURGICAL PAIN
TYPE: SURGICAL PAIN
TYPE: SURGICAL PAIN

## 2024-01-20 NOTE — CARE PLAN
The patient is Watcher - Medium risk of patient condition declining or worsening    Shift Goals  Clinical Goals: Mobility  Patient Goals: Pain control, Education  Family Goals: GALLITO    Problem: Post Op Day 1 CABG/Heart Valve Replacement  Goal: Optimal care of the post op CABG/heart valve replacement Post Op Day 1  Outcome: Progressing  Intervention: Up in chair for all meals  Note: Pt. Up to chair in the morning for breakfast.   Intervention: Ambulate in am if stable. First ambulation 25 feet. Repeat x 3 as tolerated  Note: Pt. Ambulated once around the unit.   Intervention: Discontinue perez catheter unless documented reason for continuation  Note: Perez d/c  Intervention: IS q 1 hour while awake and record best IS volume  Note: Best IS volume 1000   Intervention: Knee high TACHO hose, on during the day, off at night  Note: TACHO hose on this morning.      Problem: Pain - Standard  Goal: Alleviation of pain or a reduction in pain to the patient’s comfort goal  Description: Target End Date:  Prior to discharge or change in level of care    Document on Vitals flowsheet    1.  Document pain using the appropriate pain scale per order or unit policy  2.  Educate and implement non-pharmacologic comfort measures (i.e. relaxation, distraction, massage, cold/heat therapy, etc.)  3.  Pain management medications as ordered  4.  Reassess pain after pain med administration per policy  5.  If opiods administered assess patient's response to pain medication is appropriate per POSS sedation scale  6.  Follow pain management plan developed in collaboration with patient and interdisciplinary team (including palliative care or pain specialists if applicable)  Outcome: Progressing

## 2024-01-20 NOTE — PROGRESS NOTES
"Cardiovascular Surgery Progress Note    Name: Joe Morelos  MRN: [id number]  : 1955  Admit Date: 2024  5:01 AM  2 Days Post-Op     Procedure:  Procedure(s) and Anesthesia Type:   CORONARY ARTERY BYPASS GRAFT X3 WITH SKELETONIZED REYES TO LAD, RSVG TO OM1, RSVG TO PDA  ENDARTERECTOMY- RIGHT CORONARY ARTERY - Wound Class: Clean with Drain  ENDOSCOPIC VEIN HARVEST OF LEFT GREATER SAPHENOUS VEIN- Wound Class: Clean with Drain  ECHOCARDIOGRAM, TRANSESOPHAGEAL, INTRAOPERATIVE - Wound Class: Clean Contaminated    Vitals:  Vitals:    24 0600 24 0700 24 0710 24 0711   BP:  128/71  128/71   Pulse: 80 81 84 81   Resp: 17  18    Temp:       TempSrc:       SpO2: 92% 91% 93% 94%   Weight: 102 kg (224 lb 3.3 oz)      Height:    1.778 m (5' 10\")      Temp (24hrs), Av.8 °C (98.3 °F), Min:36.5 °C (97.7 °F), Max:37.1 °C (98.8 °F)      Respiratory:    Respiration: 18, Pulse Oximetry: 94 %       Fluids:    Intake/Output Summary (Last 24 hours) at 2024 0819  Last data filed at 2024 0600  Gross per 24 hour   Intake 1743.07 ml   Output 1050 ml   Net 693.07 ml       Admit weight: Weight: 93.4 kg (205 lb 14.6 oz)  Current weight: Weight: 102 kg (224 lb 3.3 oz) (24 0600)    Labs:  Recent Labs     24  1821 24  0130 24  0443   WBC  --  11.4* 15.9*   RBC  --  3.93* 3.71*   HEMOGLOBIN 14.1 12.0* 11.3*   HEMATOCRIT 40.7* 34.5* 32.8*   MCV  --  87.8 88.4   MCH  --  30.5 30.5   MCHC  --  34.8 34.5   RDW  --  42.6 43.8   PLATELETCT 253 199 199   MPV  --  9.0 9.0       Recent Labs     24  0130 24  0500 24  0443   SODIUM 137  --  129*   POTASSIUM 4.5 4.5 4.0   CHLORIDE 102  --  93*   CO2 28  --  31   GLUCOSE 131*  --  125*   BUN 12  --  15   CREATININE 0.61  --  0.61   CALCIUM 7.5*  --  8.3*             HgbA1c:  6.0    Diabetic Educator Consult:  no    Medications:  Scheduled Medications   Medication Dose Frequency    furosemide  40 mg BID    potassium " chloride SA  20 mEq BID    insulin regular  2-9 Units 4X/DAY ACHS    enoxaparin (LOVENOX) injection  40 mg DAILY AT 1800    Nozin nasal  swab  1 Applicator BID    acetaminophen  1,000 mg Q6HRS    senna-docusate  2 Tablet BID    And    polyethylene glycol/lytes  1 Packet DAILY    And    magnesium hydroxide  30 mL DAILY    omeprazole  20 mg DAILY    carvedilol  3.125 mg BID WITH MEALS    aspirin  81 mg DAILY    atorvastatin  40 mg QHS    clopidogrel  75 mg DAILY        Exam:   Physical Exam  Vitals and nursing note reviewed.   Constitutional:       General: He is not in acute distress.     Appearance: Normal appearance.   HENT:      Head: Normocephalic.      Right Ear: External ear normal.      Left Ear: External ear normal.      Nose: Nose normal. No congestion.      Mouth/Throat:      Mouth: Mucous membranes are moist.      Pharynx: Oropharynx is clear.   Eyes:      Extraocular Movements: Extraocular movements intact.      Pupils: Pupils are equal, round, and reactive to light.   Cardiovascular:      Rate and Rhythm: Normal rate and regular rhythm.      Pulses: Normal pulses.      Heart sounds: Normal heart sounds.   Pulmonary:      Effort: Pulmonary effort is normal.      Breath sounds: Decreased breath sounds present.   Abdominal:      General: Bowel sounds are decreased. There is no distension.      Palpations: Abdomen is soft.   Musculoskeletal:      Cervical back: Normal range of motion and neck supple. No tenderness.      Right lower leg: Edema present.      Left lower leg: Edema present.   Skin:     General: Skin is warm and dry.      Comments: Midline surgical incision- small amount serous drainage  L evh site- c/d/i   Neurological:      General: No focal deficit present.      Mental Status: He is alert and oriented to person, place, and time. Mental status is at baseline.   Psychiatric:         Mood and Affect: Mood normal.         Behavior: Behavior normal.         Thought Content: Thought content  normal.         Cardiac Medications:    ASA - Yes    Plavix - Yes    Post-operative Beta Blockers - Yes    Ace/ARB- No; contraindicated because of Normal EF    Statin - Yes    Aldactone- No; contraindicated because of Normal EF    SGLT2i-  No contraindicated because of No; contraindicated because of Normal EF    Ejection Fraction:  61%    Telemetry:   1/19 SR    Assessment/Plan:  POD 1  on norepi, SR, neuro intact, wounds intact, abdomen soft, fluid balance positive, wt up, 3 L NC. 280 out of chest tubes overnight.  Plan:  Keep chest tubes and pacing wires. 1 g CaCl. Diurese when able. IS/ambulate. CPM.    POD 2 HDS, SR- d/c pacer wires, sternal drainage - most likely edema- diurese, wean O2, amb, enc IS, transfer to tele    Disposition:  TBD

## 2024-01-20 NOTE — PROGRESS NOTES
50 mL total chest tube output since 0600.  Updated DENIS Flores. Okay to pull chest tubes.  Patient updated.     Mediastinal chest tubes removed at 1645. 450 mL in system. Order verified and dressing applied per policy. No complications. Wound Incision LDA opened.

## 2024-01-21 ENCOUNTER — APPOINTMENT (OUTPATIENT)
Dept: RADIOLOGY | Facility: MEDICAL CENTER | Age: 69
DRG: 236 | End: 2024-01-21
Attending: THORACIC SURGERY (CARDIOTHORACIC VASCULAR SURGERY)
Payer: MEDICARE

## 2024-01-21 LAB
ANION GAP SERPL CALC-SCNC: 8 MMOL/L (ref 7–16)
BUN SERPL-MCNC: 17 MG/DL (ref 8–22)
CALCIUM SERPL-MCNC: 8.2 MG/DL (ref 8.5–10.5)
CHLORIDE SERPL-SCNC: 93 MMOL/L (ref 96–112)
CO2 SERPL-SCNC: 30 MMOL/L (ref 20–33)
CREAT SERPL-MCNC: 0.77 MG/DL (ref 0.5–1.4)
EKG IMPRESSION: NORMAL
EKG IMPRESSION: NORMAL
ERYTHROCYTE [DISTWIDTH] IN BLOOD BY AUTOMATED COUNT: 43.6 FL (ref 35.9–50)
GFR SERPLBLD CREATININE-BSD FMLA CKD-EPI: 97 ML/MIN/1.73 M 2
GLUCOSE SERPL-MCNC: 114 MG/DL (ref 65–99)
HCT VFR BLD AUTO: 31 % (ref 42–52)
HGB BLD-MCNC: 10.7 G/DL (ref 14–18)
MAGNESIUM SERPL-MCNC: 2.1 MG/DL (ref 1.5–2.5)
MCH RBC QN AUTO: 30.7 PG (ref 27–33)
MCHC RBC AUTO-ENTMCNC: 34.5 G/DL (ref 32.3–36.5)
MCV RBC AUTO: 89.1 FL (ref 81.4–97.8)
PLATELET # BLD AUTO: 205 K/UL (ref 164–446)
PMV BLD AUTO: 9.3 FL (ref 9–12.9)
POTASSIUM SERPL-SCNC: 3.5 MMOL/L (ref 3.6–5.5)
POTASSIUM SERPL-SCNC: 4.1 MMOL/L (ref 3.6–5.5)
RBC # BLD AUTO: 3.48 M/UL (ref 4.7–6.1)
SODIUM SERPL-SCNC: 131 MMOL/L (ref 135–145)
WBC # BLD AUTO: 13.2 K/UL (ref 4.8–10.8)

## 2024-01-21 PROCEDURE — 94669 MECHANICAL CHEST WALL OSCILL: CPT

## 2024-01-21 PROCEDURE — 700102 HCHG RX REV CODE 250 W/ 637 OVERRIDE(OP): Performed by: NURSE PRACTITIONER

## 2024-01-21 PROCEDURE — 85027 COMPLETE CBC AUTOMATED: CPT

## 2024-01-21 PROCEDURE — 700111 HCHG RX REV CODE 636 W/ 250 OVERRIDE (IP): Mod: JZ | Performed by: NURSE PRACTITIONER

## 2024-01-21 PROCEDURE — 71046 X-RAY EXAM CHEST 2 VIEWS: CPT

## 2024-01-21 PROCEDURE — A9270 NON-COVERED ITEM OR SERVICE: HCPCS | Performed by: NURSE PRACTITIONER

## 2024-01-21 PROCEDURE — 97162 PT EVAL MOD COMPLEX 30 MIN: CPT

## 2024-01-21 PROCEDURE — 770020 HCHG ROOM/CARE - TELE (206)

## 2024-01-21 PROCEDURE — 93010 ELECTROCARDIOGRAM REPORT: CPT | Performed by: INTERNAL MEDICINE

## 2024-01-21 PROCEDURE — 80048 BASIC METABOLIC PNL TOTAL CA: CPT

## 2024-01-21 PROCEDURE — 83735 ASSAY OF MAGNESIUM: CPT

## 2024-01-21 PROCEDURE — 97535 SELF CARE MNGMENT TRAINING: CPT

## 2024-01-21 PROCEDURE — 84132 ASSAY OF SERUM POTASSIUM: CPT

## 2024-01-21 PROCEDURE — 93005 ELECTROCARDIOGRAM TRACING: CPT | Performed by: THORACIC SURGERY (CARDIOTHORACIC VASCULAR SURGERY)

## 2024-01-21 PROCEDURE — 99024 POSTOP FOLLOW-UP VISIT: CPT | Performed by: NURSE PRACTITIONER

## 2024-01-21 RX ORDER — AMIODARONE HYDROCHLORIDE 200 MG/1
400 TABLET ORAL 2 TIMES DAILY
Status: DISCONTINUED | OUTPATIENT
Start: 2024-01-21 | End: 2024-01-23 | Stop reason: HOSPADM

## 2024-01-21 RX ADMIN — ACETAMINOPHEN 1000 MG: 500 TABLET ORAL at 16:39

## 2024-01-21 RX ADMIN — OXYCODONE HYDROCHLORIDE 5 MG: 5 TABLET ORAL at 16:39

## 2024-01-21 RX ADMIN — Medication 1 APPLICATOR: at 21:36

## 2024-01-21 RX ADMIN — AMIODARONE HYDROCHLORIDE 0.5 MG/MIN: 1.8 INJECTION, SOLUTION INTRAVENOUS at 16:38

## 2024-01-21 RX ADMIN — FUROSEMIDE 40 MG: 10 INJECTION, SOLUTION INTRAMUSCULAR; INTRAVENOUS at 16:32

## 2024-01-21 RX ADMIN — ACETAMINOPHEN 1000 MG: 500 TABLET ORAL at 05:12

## 2024-01-21 RX ADMIN — AMIODARONE HYDROCHLORIDE 400 MG: 200 TABLET ORAL at 21:37

## 2024-01-21 RX ADMIN — ATORVASTATIN CALCIUM 40 MG: 40 TABLET, FILM COATED ORAL at 21:37

## 2024-01-21 RX ADMIN — POLYETHYLENE GLYCOL 3350 1 PACKET: 17 POWDER, FOR SOLUTION ORAL at 05:09

## 2024-01-21 RX ADMIN — ACETAMINOPHEN 1000 MG: 500 TABLET ORAL at 11:04

## 2024-01-21 RX ADMIN — OXYCODONE HYDROCHLORIDE 5 MG: 5 TABLET ORAL at 00:56

## 2024-01-21 RX ADMIN — Medication 1 APPLICATOR: at 07:51

## 2024-01-21 RX ADMIN — ASPIRIN 81 MG: 81 TABLET, COATED ORAL at 05:15

## 2024-01-21 RX ADMIN — OXYCODONE HYDROCHLORIDE 5 MG: 5 TABLET ORAL at 21:37

## 2024-01-21 RX ADMIN — DOCUSATE SODIUM 50 MG AND SENNOSIDES 8.6 MG 2 TABLET: 8.6; 5 TABLET, FILM COATED ORAL at 05:13

## 2024-01-21 RX ADMIN — CARVEDILOL 3.12 MG: 3.12 TABLET, FILM COATED ORAL at 16:39

## 2024-01-21 RX ADMIN — MAGNESIUM HYDROXIDE 30 ML: 1200 LIQUID ORAL at 05:09

## 2024-01-21 RX ADMIN — POTASSIUM CHLORIDE 20 MEQ: 20 TABLET, EXTENDED RELEASE ORAL at 16:39

## 2024-01-21 RX ADMIN — POTASSIUM CHLORIDE 20 MEQ: 20 TABLET, EXTENDED RELEASE ORAL at 05:12

## 2024-01-21 RX ADMIN — AMIODARONE HYDROCHLORIDE 1 MG/MIN: 1.8 INJECTION, SOLUTION INTRAVENOUS at 10:58

## 2024-01-21 RX ADMIN — OMEPRAZOLE 20 MG: 20 CAPSULE, DELAYED RELEASE ORAL at 05:12

## 2024-01-21 RX ADMIN — DOCUSATE SODIUM 50 MG AND SENNOSIDES 8.6 MG 2 TABLET: 8.6; 5 TABLET, FILM COATED ORAL at 16:39

## 2024-01-21 RX ADMIN — CLOPIDOGREL BISULFATE 75 MG: 75 TABLET ORAL at 05:13

## 2024-01-21 RX ADMIN — FUROSEMIDE 40 MG: 10 INJECTION, SOLUTION INTRAMUSCULAR; INTRAVENOUS at 05:12

## 2024-01-21 RX ADMIN — ENOXAPARIN SODIUM 40 MG: 100 INJECTION SUBCUTANEOUS at 16:42

## 2024-01-21 RX ADMIN — AMIODARONE HYDROCHLORIDE 150 MG: 1.5 INJECTION, SOLUTION INTRAVENOUS at 10:43

## 2024-01-21 RX ADMIN — CARVEDILOL 3.12 MG: 3.12 TABLET, FILM COATED ORAL at 07:51

## 2024-01-21 ASSESSMENT — COGNITIVE AND FUNCTIONAL STATUS - GENERAL
MOBILITY SCORE: 21
MOVING TO AND FROM BED TO CHAIR: A LITTLE
MOVING FROM LYING ON BACK TO SITTING ON SIDE OF FLAT BED: A LITTLE
TURNING FROM BACK TO SIDE WHILE IN FLAT BAD: A LITTLE
SUGGESTED CMS G CODE MODIFIER MOBILITY: CJ

## 2024-01-21 ASSESSMENT — PAIN DESCRIPTION - PAIN TYPE
TYPE: ACUTE PAIN;SURGICAL PAIN
TYPE: ACUTE PAIN

## 2024-01-21 ASSESSMENT — GAIT ASSESSMENTS
GAIT LEVEL OF ASSIST: SUPERVISED
DISTANCE (FEET): 200
DEVIATION: BRADYKINETIC;DECREASED TOE OFF

## 2024-01-21 ASSESSMENT — FIBROSIS 4 INDEX: FIB4 SCORE: 1.64

## 2024-01-21 NOTE — CARE PLAN
The patient is Stable - Low risk of patient condition declining or worsening    Shift Goals  Clinical Goals: Mobility  Patient Goals: Pain control, Education  Family Goals: GALLITO    Progress made toward(s) clinical / shift goals:    Problem: Pain - Standard  Goal: Alleviation of pain or a reduction in pain to the patient’s comfort goal  Outcome: Progressing     Problem: Post op day 2 CABG/Heart Valve Replacement  Goal: Optimal care of the post op CABG/heart valve replacement post op day 2  Outcome: Progressing  Intervention: FSBS: when 2 consecutive BS < 130 after post op day 2, discontinue FSBS unless patient is insulin dependent diabetic  Note: Completed.   Intervention: Daily weights in the morning  Note: Pt weighed in am and upon arrival to tele unit.   Intervention: Up in chair for all meals  Note: Pt has been up to chair all day  Intervention: Ambulate 4 times daily, increasing the distance each time  Note: Pt ambulating one lap around unit with standby assist.   Intervention: Stand at sink and wash up with assistance.  Clean incisions twice daily with soap and water.  Note: Pt given bed bath, incision care completed.   Intervention: IS q 1 hour while awake and record best IS volume  Note: IS 1000  Intervention: Consider pacer wire removal by MD  Note: Completed by DENIS Chacon  Intervention: Consider removal of perez and chest tube if not already done  Note: Completed       Patient is not progressing towards the following goals:

## 2024-01-21 NOTE — CARE PLAN
The patient is Watcher - Medium risk of patient condition declining or worsening    Shift Goals  Clinical Goals: Mobility  Patient Goals: Pain control, Education  Family Goals: GALLITO    Progress made toward(s) clinical / shift goals:        Problem: Post op day 2 CABG/Heart Valve Replacement  Goal: Optimal care of the post op CABG/heart valve replacement post op day 2  Outcome: Progressing  Intervention: FSBS: when 2 consecutive BS < 130 after post op day 2, discontinue FSBS unless patient is insulin dependent diabetic  Note: Patient post op day 2, will evaluate tomorrow to determine discontinuing FSBS  Intervention: Daily weights in the morning  Note: Patient weighed this AM and upon transfer to unit  Intervention: Up in chair for all meals  Note: Patient up to chair for all meals  Intervention: Ambulate 4 times daily, increasing the distance each time  Note: Patient ambulated twice during this shift  Intervention: Stand at sink and wash up with assistance.  Clean incisions twice daily with soap and water.  Note: Patient washed incisions during this shift  Intervention: IS q 1 hour while awake and record best IS volume  Note: Best IS volume is 1000. Patient educated to use IS during every commercial break. Patient verbalized understanding.  Intervention: Consider pacer wire removal by MD  Note: Pacer wires removed  Intervention: Consider removal of perez and chest tube if not already done  Note: Perez and chest tubes removed

## 2024-01-21 NOTE — CARE PLAN
The patient is Stable - Low risk of patient condition declining or worsening    Shift Goals  Clinical Goals: OH care plan  Patient Goals: sleep  Family Goals: GALLITO    Progress made toward(s) clinical / shift goals:    Problem: Knowledge Deficit - Standard  Goal: Patient and family/care givers will demonstrate understanding of plan of care, disease process/condition, diagnostic tests and medications  Outcome: Progressing     Problem: Pain - Standard  Goal: Alleviation of pain or a reduction in pain to the patient’s comfort goal  Outcome: Progressing     Problem: Skin Integrity  Goal: Skin integrity is maintained or improved  Outcome: Progressing     Problem: Fall Risk  Goal: Patient will remain free from falls  Outcome: Progressing     Problem: Post op day 2 CABG/Heart Valve Replacement  Goal: Optimal care of the post op CABG/heart valve replacement post op day 2  1/21/2024 0310 by Francisca Joiner, R.N.  Outcome: Progressing  1/21/2024 0308 by Francisca Joiner, R.N.  Note: Daily weights recorded in flowsheets, IS in use while awake, daily walks complete, up in chair for meals, TACHO hose in use during the day, SCD in use overnight, I & O recorded, incision care complete, chg complete.         Patient is not progressing towards the following goals:

## 2024-01-21 NOTE — PROGRESS NOTES
4 Eyes Skin Assessment Completed by TISHA Gan and TISHA Weeks.    Head WDL  Ears Redness and Blanching  Nose WDL  Mouth WDL  Neck WDL  Breast/Chest Bruising and Incision, midline open heart incision, weepy  Shoulder Blades WDL  Spine WDL  (R) Arm/Elbow/Hand Redness, Blanching, Bruising, and Edema  (L) Arm/Elbow/Hand Redness, Blanching, Bruising, and Edema  Abdomen Incision, chest tube sites weepy  Groin Redness, Blanching, and Bruising  Scrotum/Coccyx/Buttocks Redness and Blanching  (R) Leg Scab, Bruising, and Edema  (L) Leg Scab, Bruising, Edema, and Incision, 2 EVH incisions   (R) Heel/Foot/Toe Redness, Blanching, and Edema  (L) Heel/Foot/Toe Redness, Blanching, and Edema          Devices In Places Tele Box, Pulse Ox, and Nasal Cannula      Interventions In Place NC W/Ear Foams and Pressure Redistribution Mattress    Possible Skin Injury Yes    Pictures Uploaded Into Epic N/A  Wound Consult Placed N/A  RN Wound Prevention Protocol Ordered No

## 2024-01-21 NOTE — CARE PLAN
Problem: Hyperinflation  Goal: Prevent or improve atelectasis  Description: Target End Date:  3 to 4 days    1. Instruct incentive spirometry usage  2.  Perform hyperinflation therapy as indicated  Outcome: Progressing       Respiratory Update    Treatment modality: PEP (1250)  Frequency: QID    Pt tolerating current treatments well with no adverse reactions.

## 2024-01-21 NOTE — PROGRESS NOTES
Report from TISHA Dempsey. Pt up to [id number] with [facility] RN. Pt oriented to room. Pt is A&Ox4, currently at 93% on 3L NC. Fall precautions in place, belongings and call light within reach.

## 2024-01-21 NOTE — CARE PLAN
"The patient is Watcher - Medium risk of patient condition declining or worsening    Shift Goals  Clinical Goals: Monitor O2, vitals, OHS care plan  Patient Goals: \"Walk more and shower\"  Family Goals: GALLITO    Progress made toward(s) clinical / shift goals:        Problem: Post Op Day 3 CABG/Heart Valve replacement  Goal: Optimal care of the post op CABG/Heart Valve replacement post op day 3  Outcome: Progressing  Intervention: Daily weights in the morning  Note: Patient was weighed this AM  Intervention: Shower daily and clean incisions twice daily with soap and water  Note: Patient showered and cleaned incisions   Intervention: Up in chair for all meals  Note: Patient has been up in the recliner during the whole shift  Intervention: Ambulate 4 times daily, increasing the distance each time  Note: Patient walked twice during this shift  Intervention: IS q 1 hour while awake and record best IS volume  Note: Patient using the IS every hour or more. Best IS volume is 1500 which is an improvement from last night.  Intervention: Consider removal of perez, chest tube and pacer wires if not already done  Note: All lines and drains removed   Intervention: Assess need for case management and  for discharge planning  Note: No needs at this time. Continuing to wean oxygen.         "

## 2024-01-21 NOTE — PROGRESS NOTES
Assumed care of patient, received bedside report from NOC RN. Patient is A&O X 4. Pain 4/10. Vital signs stable overnight, on 3L NC. On tele monitor, SR 80. POC discussed with patient and he verbalized understanding. Call light within reach and fall precautions in place. Bed locked and in lowest position. Patient verbalized understanding regarding sternal precautions.

## 2024-01-21 NOTE — PROGRESS NOTES
Monitor Summary  SR 70s-90s                 Advancement-Rotation Flap Text: The defect edges were debeveled with a #15 scalpel blade.  Given the location of the defect, shape of the defect and the proximity to free margins an advancement-rotation flap was deemed most appropriate.  Using a sterile surgical marker, an appropriate flap was drawn incorporating the defect and placing the expected incisions within the relaxed skin tension lines where possible. The area thus outlined was incised deep to adipose tissue with a #15 scalpel blade.  The skin margins were undermined to an appropriate distance in all directions utilizing iris scissors.

## 2024-01-21 NOTE — PROGRESS NOTES
This RN notified DENIS Chacon of patients potassium level of 3.5, DENIS Chacon stated she would add new orders.

## 2024-01-21 NOTE — PROGRESS NOTES
"Cardiovascular Surgery Progress Note    Name: Joe Morelos  MRN: [id number]  : 1955  Admit Date: 2024  5:01 AM  3 Days Post-Op     Procedure:  Procedure(s) and Anesthesia Type:   CORONARY ARTERY BYPASS GRAFT X3 WITH SKELETONIZED REYES TO LAD, RSVG TO OM1, RSVG TO PDA  ENDARTERECTOMY- RIGHT CORONARY ARTERY - Wound Class: Clean with Drain  ENDOSCOPIC VEIN HARVEST OF LEFT GREATER SAPHENOUS VEIN- Wound Class: Clean with Drain  ECHOCARDIOGRAM, TRANSESOPHAGEAL, INTRAOPERATIVE - Wound Class: Clean Contaminated    Vitals:  Vitals:    24 0501 24 0745 24 0748 24 0749   BP: 124/71 120/70     Pulse: 91 63 90    Resp: 18 18 18    Temp: 36.9 °C (98.4 °F) 35.9 °C (96.6 °F)     TempSrc: Temporal Temporal     SpO2: 94% 96% (!) 84%    Weight:       Height:    1.778 m (5' 10\")      Temp (24hrs), Av.4 °C (97.6 °F), Min:35.9 °C (96.6 °F), Max:36.9 °C (98.4 °F)      Respiratory:    Respiration: 18, Pulse Oximetry: (!) 84 %       Fluids:    Intake/Output Summary (Last 24 hours) at 2024 1009  Last data filed at 2024 1000  Gross per 24 hour   Intake 1040 ml   Output 3500 ml   Net -2460 ml       Admit weight: Weight: 93.4 kg (205 lb 14.6 oz)  Current weight: Weight: 101 kg (221 lb 12.5 oz) (24 0433)    Labs:  Recent Labs     24  0130 24  0443 24  0044   WBC 11.4* 15.9* 13.2*   RBC 3.93* 3.71* 3.48*   HEMOGLOBIN 12.0* 11.3* 10.7*   HEMATOCRIT 34.5* 32.8* 31.0*   MCV 87.8 88.4 89.1   MCH 30.5 30.5 30.7   MCHC 34.8 34.5 34.5   RDW 42.6 43.8 43.6   PLATELETCT 199 199 205   MPV 9.0 9.0 9.3       Recent Labs     24  0130 24  0500 24  0443 24  0044   SODIUM 137  --  129* 131*   POTASSIUM 4.5 4.5 4.0 4.1   CHLORIDE 102  --  93* 93*   CO2 28  --  31 30   GLUCOSE 131*  --  125* 114*   BUN 12  --  15 17   CREATININE 0.61  --  0.61 0.77   CALCIUM 7.5*  --  8.3* 8.2*             HgbA1c:  6.0    Diabetic Educator Consult:  no    Medications:  Scheduled " Medications   Medication Dose Frequency    furosemide  40 mg BID    potassium chloride SA  20 mEq BID    enoxaparin (LOVENOX) injection  40 mg DAILY AT 1800    Nozin nasal  swab  1 Applicator BID    acetaminophen  1,000 mg Q6HRS    senna-docusate  2 Tablet BID    And    polyethylene glycol/lytes  1 Packet DAILY    And    magnesium hydroxide  30 mL DAILY    omeprazole  20 mg DAILY    carvedilol  3.125 mg BID WITH MEALS    aspirin  81 mg DAILY    atorvastatin  40 mg QHS    clopidogrel  75 mg DAILY        Exam:   Physical Exam  Vitals and nursing note reviewed.   Constitutional:       General: He is not in acute distress.     Appearance: Normal appearance.   HENT:      Head: Normocephalic.      Nose: Nose normal. No congestion.      Mouth/Throat:      Mouth: Mucous membranes are moist.      Pharynx: Oropharynx is clear.   Eyes:      Pupils: Pupils are equal, round, and reactive to light.   Cardiovascular:      Rate and Rhythm: Normal rate and regular rhythm.      Pulses: Normal pulses.      Heart sounds: Normal heart sounds.   Pulmonary:      Effort: Pulmonary effort is normal.      Breath sounds: Decreased breath sounds present.   Abdominal:      General: Bowel sounds are decreased. There is no distension.      Palpations: Abdomen is soft.   Musculoskeletal:      Cervical back: Normal range of motion and neck supple. No tenderness.      Right lower leg: Edema present.      Left lower leg: Edema present.   Skin:     General: Skin is warm and dry.      Comments: Midline surgical incision- small amount serous drainage  L evh site- c/d/i   Neurological:      General: No focal deficit present.      Mental Status: He is alert and oriented to person, place, and time.   Psychiatric:         Mood and Affect: Mood normal.         Behavior: Behavior normal.         Thought Content: Thought content normal.         Cardiac Medications:    ASA - Yes    Plavix - Yes    Post-operative Beta Blockers - Yes    Ace/ARB- No;  contraindicated because of Normal EF    Statin - Yes    Aldactone- No; contraindicated because of Normal EF    SGLT2i-  No contraindicated because of No; contraindicated because of Normal EF    Ejection Fraction:  61%    Telemetry:   1/19 SR  1/20 SR  1/21 Afib RVR    Assessment/Plan:  POD 1  on norepi, SR, neuro intact, wounds intact, abdomen soft, fluid balance positive, wt up, 3 L NC. 280 out of chest tubes overnight.  Plan:  Keep chest tubes and pacing wires. 1 g CaCl. Diurese when able. IS/ambulate. CPM.    POD 2 HDS, SR- d/c pacer wires, sternal drainage - most likely edema- diurese, wean O2, amb, enc IS, transfer to tele    POD 3 HDS, Afib RVR- starting amio protocol, sternal drainage - minimal, diuresing well- cont, wean oxygen    Disposition:  TBD

## 2024-01-21 NOTE — THERAPY
"Physical Therapy   Initial Evaluation (Phase I Cardiac Rehab)     Patient Name: Joe Morelos  Age:  68 y.o., Sex:  male  Medical Record #: [id number]  Today's Date: 1/21/2024     Precautions  Precautions: Cardiac Precautions (See Comments);Sternal Precautions (See Comments)    Assessment  Patient is 68 y.o. male presenting POD3 CABGx3 w/ a PMH of DLD, HTN, prediabetes and obesity.  He lives alone in a 2SH previously independent, and reports he has good social support available should he need assistance w/ IADL's (see flowsheet for home set-up and PLOF).      Currently, the pt displays reduced overall activity tolerance, otherwise near-baseline independence demo'd w/ functional mobility tasks.  He was received seated in his recliner, and edu provided re: expectations for phase I of cardiac rehab, sternal precautions, use of RPE scale/\"talk test\", and home activity modifications w/ good understanding verbalized by the pt.  He demo'd STS from chair w/ no AD spv, along w/ gait 200' using no AD spv via slow priti and limited toe-off, no LOB observed and distance limited by fatigue (RPE 13, (+) \"talk test\", HR and BP stable throughout).  Recommend pt DC home w/ OP cardiac rehab f/u as indicated given extent of functional independence demo'd at this time.  Will not follow, please re consult should there be a change in the pt's condition.    Plan    Physical Therapy Initial Treatment Plan   Duration: Evaluation only    DC Equipment Recommendations: None  Discharge Recommendations: Other - (OP cardiac rehab as indicated)       Subjective/Objective       01/21/24 1007   Prior Living Situation   Prior Services Home-Independent   Housing / Facility 2 Story House   Steps Into Home 2   Steps In Home 15   Equipment Owned None   Lives with - Patient's Self Care Capacity Alone and Able to Care For Self   Comments pt lives alone, reports he has good outside support from friends and family   Prior Level of Functional Mobility " "  Bed Mobility Independent   Transfer Status Independent   Ambulation Independent   Ambulation Distance Community distances   Assistive Devices Used None   Stairs Independent   History of Falls   History of Falls No   Date of Last Fall   (pt denies any recent falls)   Cognition    Cognition / Consciousness WDL   Level of Consciousness Alert   Comments pt pleasant and cooperative   Passive ROM Lower Body   Passive ROM Lower Body WDL   Active ROM Lower Body    Active ROM Lower Body  WDL   Comments observed during functional mobility tasks   Strength Lower Body   Lower Body Strength  WDL   Comments as above   Sensation Lower Body   Lower Extremity Sensation   WDL   Comments pt denies numbness/tingling in his LE's   Coordination Lower Body    Coordination Lower Body  Not Tested   Other Treatments   Other Treatments Provided Edu provided re: expectations for phase I of cardiac rehab, sternal precautions, use of RPE scale/\"talk test\", and home activity modifications.   Balance Assessment   Sitting Balance (Static) Good   Sitting Balance (Dynamic) Good   Standing Balance (Static) Fair +   Standing Balance (Dynamic) Fair +   Weight Shift Sitting Good   Weight Shift Standing Good   Comments stand w/ no AD   Bed Mobility    Comments Pt position seated in recliner pre- and post-assessment.  Reports he plans to sleep in recliner once he returns home.   Gait Analysis   Gait Level Of Assist Supervised   Assistive Device None   Distance (Feet) 200   # of Times Distance was Traveled 1   Deviation Bradykinetic;Decreased Toe Off   # of Stairs Climbed 15   Level of Assist with Stairs Supervised   Weight Bearing Status no restrictions   Vision Deficits Impacting Mobility pt denies   Comments distance limited by fatigue (RPE 13, (+) \"talk test\", HR and BP stable throughout)   Functional Mobility   Sit to Stand Supervised   Bed, Chair, Wheelchair Transfer Supervised   Transfer Method Stand Step   Mobility STS chair w/ no AD; " chair<>hallway w/ no AD   Activity Tolerance   Sitting in Chair >45min   Standing 12min   Edema / Skin Assessment   Edema / Skin  Not Assessed   Education Group   Education Provided Role of Physical Therapist;Sternal Precautions;Cardiac Precautions   Cardiac Precautions Patient Response Patient;Acceptance;Explanation;Demonstration;Handout;Verbal Demonstration;Action Demonstration   Sternal Precautions Patient Response Patient;Acceptance;Explanation;Demonstration;Handout;Verbal Demonstration;Action Demonstration   Role of Physical Therapist Patient Response Patient;Acceptance;Explanation;Demonstration;Action Demonstration   Additional Comments pt receptive of [facility] provided   Problem List    Problems Decreased Activity Tolerance   Interdisciplinary Plan of Care Collaboration   IDT Collaboration with  Nursing   Patient Position at End of Therapy Seated;Chair Alarm On;Call Light within Reach;Tray Table within Reach;Phone within Reach   Collaboration Comments regarding outcome of tx session   Session Information   Date / Session Number  1/21- eval only

## 2024-01-22 ENCOUNTER — APPOINTMENT (OUTPATIENT)
Dept: RADIOLOGY | Facility: MEDICAL CENTER | Age: 69
DRG: 236 | End: 2024-01-22
Attending: NURSE PRACTITIONER
Payer: MEDICARE

## 2024-01-22 LAB
ANION GAP SERPL CALC-SCNC: 8 MMOL/L (ref 7–16)
BUN SERPL-MCNC: 15 MG/DL (ref 8–22)
CALCIUM SERPL-MCNC: 8.3 MG/DL (ref 8.5–10.5)
CHLORIDE SERPL-SCNC: 93 MMOL/L (ref 96–112)
CO2 SERPL-SCNC: 31 MMOL/L (ref 20–33)
CREAT SERPL-MCNC: 0.68 MG/DL (ref 0.5–1.4)
ERYTHROCYTE [DISTWIDTH] IN BLOOD BY AUTOMATED COUNT: 42.8 FL (ref 35.9–50)
GFR SERPLBLD CREATININE-BSD FMLA CKD-EPI: 101 ML/MIN/1.73 M 2
GLUCOSE SERPL-MCNC: 122 MG/DL (ref 65–99)
HCT VFR BLD AUTO: 29.7 % (ref 42–52)
HGB BLD-MCNC: 10.2 G/DL (ref 14–18)
MCH RBC QN AUTO: 30.3 PG (ref 27–33)
MCHC RBC AUTO-ENTMCNC: 34.3 G/DL (ref 32.3–36.5)
MCV RBC AUTO: 88.1 FL (ref 81.4–97.8)
PLATELET # BLD AUTO: 233 K/UL (ref 164–446)
PMV BLD AUTO: 9 FL (ref 9–12.9)
POTASSIUM SERPL-SCNC: 3.5 MMOL/L (ref 3.6–5.5)
RBC # BLD AUTO: 3.37 M/UL (ref 4.7–6.1)
SODIUM SERPL-SCNC: 132 MMOL/L (ref 135–145)
WBC # BLD AUTO: 10.7 K/UL (ref 4.8–10.8)

## 2024-01-22 PROCEDURE — 97166 OT EVAL MOD COMPLEX 45 MIN: CPT

## 2024-01-22 PROCEDURE — 770020 HCHG ROOM/CARE - TELE (206)

## 2024-01-22 PROCEDURE — 94669 MECHANICAL CHEST WALL OSCILL: CPT

## 2024-01-22 PROCEDURE — 700102 HCHG RX REV CODE 250 W/ 637 OVERRIDE(OP): Performed by: NURSE PRACTITIONER

## 2024-01-22 PROCEDURE — 97535 SELF CARE MNGMENT TRAINING: CPT

## 2024-01-22 PROCEDURE — 85027 COMPLETE CBC AUTOMATED: CPT

## 2024-01-22 PROCEDURE — 71045 X-RAY EXAM CHEST 1 VIEW: CPT

## 2024-01-22 PROCEDURE — A9270 NON-COVERED ITEM OR SERVICE: HCPCS | Performed by: NURSE PRACTITIONER

## 2024-01-22 PROCEDURE — 700111 HCHG RX REV CODE 636 W/ 250 OVERRIDE (IP): Mod: JZ | Performed by: NURSE PRACTITIONER

## 2024-01-22 PROCEDURE — 99024 POSTOP FOLLOW-UP VISIT: CPT | Performed by: NURSE PRACTITIONER

## 2024-01-22 PROCEDURE — 80048 BASIC METABOLIC PNL TOTAL CA: CPT

## 2024-01-22 RX ORDER — POTASSIUM CHLORIDE 20 MEQ/1
40 TABLET, EXTENDED RELEASE ORAL ONCE
Status: COMPLETED | OUTPATIENT
Start: 2024-01-22 | End: 2024-01-22

## 2024-01-22 RX ORDER — POTASSIUM CHLORIDE 20 MEQ/1
20 TABLET, EXTENDED RELEASE ORAL 3 TIMES DAILY
Status: DISCONTINUED | OUTPATIENT
Start: 2024-01-22 | End: 2024-01-23 | Stop reason: HOSPADM

## 2024-01-22 RX ORDER — AMOXICILLIN AND CLAVULANATE POTASSIUM 875; 125 MG/1; MG/1
1 TABLET, FILM COATED ORAL EVERY 12 HOURS
Status: DISCONTINUED | OUTPATIENT
Start: 2024-01-22 | End: 2024-01-23 | Stop reason: HOSPADM

## 2024-01-22 RX ADMIN — DOCUSATE SODIUM 50 MG AND SENNOSIDES 8.6 MG 2 TABLET: 8.6; 5 TABLET, FILM COATED ORAL at 04:25

## 2024-01-22 RX ADMIN — AMIODARONE HYDROCHLORIDE 400 MG: 200 TABLET ORAL at 08:40

## 2024-01-22 RX ADMIN — AMOXICILLIN AND CLAVULANATE POTASSIUM 1 TABLET: 875; 125 TABLET, FILM COATED ORAL at 17:07

## 2024-01-22 RX ADMIN — Medication 1 APPLICATOR: at 21:57

## 2024-01-22 RX ADMIN — Medication 1 APPLICATOR: at 08:40

## 2024-01-22 RX ADMIN — FUROSEMIDE 40 MG: 10 INJECTION, SOLUTION INTRAMUSCULAR; INTRAVENOUS at 04:26

## 2024-01-22 RX ADMIN — AMIODARONE HYDROCHLORIDE 0.5 MG/MIN: 1.8 INJECTION, SOLUTION INTRAVENOUS at 03:16

## 2024-01-22 RX ADMIN — ACETAMINOPHEN 1000 MG: 500 TABLET ORAL at 12:12

## 2024-01-22 RX ADMIN — CARVEDILOL 3.12 MG: 3.12 TABLET, FILM COATED ORAL at 17:07

## 2024-01-22 RX ADMIN — DOCUSATE SODIUM 50 MG AND SENNOSIDES 8.6 MG 2 TABLET: 8.6; 5 TABLET, FILM COATED ORAL at 17:07

## 2024-01-22 RX ADMIN — POTASSIUM CHLORIDE 40 MEQ: 20 TABLET, EXTENDED RELEASE ORAL at 12:12

## 2024-01-22 RX ADMIN — POTASSIUM CHLORIDE 20 MEQ: 20 TABLET, EXTENDED RELEASE ORAL at 04:25

## 2024-01-22 RX ADMIN — POLYETHYLENE GLYCOL 3350 1 PACKET: 17 POWDER, FOR SOLUTION ORAL at 04:25

## 2024-01-22 RX ADMIN — MAGNESIUM HYDROXIDE 30 ML: 1200 LIQUID ORAL at 04:24

## 2024-01-22 RX ADMIN — POTASSIUM CHLORIDE 20 MEQ: 20 TABLET, EXTENDED RELEASE ORAL at 17:07

## 2024-01-22 RX ADMIN — ATORVASTATIN CALCIUM 40 MG: 40 TABLET, FILM COATED ORAL at 21:56

## 2024-01-22 RX ADMIN — CLOPIDOGREL BISULFATE 75 MG: 75 TABLET ORAL at 04:25

## 2024-01-22 RX ADMIN — ACETAMINOPHEN 1000 MG: 500 TABLET ORAL at 05:20

## 2024-01-22 RX ADMIN — ACETAMINOPHEN 1000 MG: 500 TABLET ORAL at 17:07

## 2024-01-22 RX ADMIN — AMIODARONE HYDROCHLORIDE 400 MG: 200 TABLET ORAL at 21:56

## 2024-01-22 RX ADMIN — CARVEDILOL 3.12 MG: 3.12 TABLET, FILM COATED ORAL at 08:40

## 2024-01-22 RX ADMIN — FUROSEMIDE 40 MG: 10 INJECTION, SOLUTION INTRAMUSCULAR; INTRAVENOUS at 17:07

## 2024-01-22 RX ADMIN — ACETAMINOPHEN 1000 MG: 500 TABLET ORAL at 00:36

## 2024-01-22 RX ADMIN — POTASSIUM CHLORIDE 20 MEQ: 20 TABLET, EXTENDED RELEASE ORAL at 12:12

## 2024-01-22 RX ADMIN — OMEPRAZOLE 20 MG: 20 CAPSULE, DELAYED RELEASE ORAL at 04:25

## 2024-01-22 RX ADMIN — ENOXAPARIN SODIUM 40 MG: 100 INJECTION SUBCUTANEOUS at 17:07

## 2024-01-22 RX ADMIN — ASPIRIN 81 MG: 81 TABLET, COATED ORAL at 04:25

## 2024-01-22 ASSESSMENT — ACTIVITIES OF DAILY LIVING (ADL): TOILETING: INDEPENDENT

## 2024-01-22 ASSESSMENT — COGNITIVE AND FUNCTIONAL STATUS - GENERAL
DAILY ACTIVITIY SCORE: 24
SUGGESTED CMS G CODE MODIFIER DAILY ACTIVITY: CH

## 2024-01-22 ASSESSMENT — PAIN DESCRIPTION - PAIN TYPE
TYPE: ACUTE PAIN
TYPE: ACUTE PAIN

## 2024-01-22 ASSESSMENT — FIBROSIS 4 INDEX: FIB4 SCORE: 1.44

## 2024-01-22 NOTE — DISCHARGE PLANNING
HTH/SCP TCN chart review completed. Collaborated with SCOTT Cecilia prior to meeting with the pt. The most current review of medical record, knowledge of pt's PLOF and social support, LACE+ score of 12, 6 clicks scores of 21 ADL's and 21 mobility were considered.  Per Kardex, patient is ambulating 1000 feet X 1 no AD.      Pt seen at bedside. Introduced TCN program. Provided education regarding post acute levels of care. Discussed HTH/SCP plan benefits. Pt verbalizes understanding.     Patient states he lives alone in a 2 level home and was independent with ADL's, IADL's, mobility (no AD) and driving at baseline.  Patient is currently on 2 L/min O2 and is on RA at his baseline.  He denies any concerns with food, housing or transportation.  He states he is close to his baseline level of function.      Choice proactively obtained for DME (O2) if indicated, faxed to [facility] and given to SCOTT.  TCN will continue to follow and collaborate with discharge planning team as additional post acute needs arise. Thank you.     Completed today:  PT recommends (OP cardiac rehab as indicated) on 1/21/24.    OT recommendations are pending.     Choice obtained:  DME (O2) if indicated.  SCP with [facility] PCP.   Referred to  for Follow up appointment.

## 2024-01-22 NOTE — PROGRESS NOTES
Monitor Summary:  Rhythm: SR/Afib  Rate:   Ectopy: 1.8 second pause  Measurement: .16/.06/.32

## 2024-01-22 NOTE — CARE PLAN
Problem: Hyperinflation  Goal: Prevent or improve atelectasis  Description: Target End Date:  3 to 4 days    1. Instruct incentive spirometry usage  2.  Perform hyperinflation therapy as indicated  Outcome: Progressing   PEP BID   IS 1250

## 2024-01-22 NOTE — THERAPY
Occupational Therapy   Initial Evaluation     Patient Name: Joe Morelos  Age:  68 y.o., Sex:  male  Medical Record #: [id number]  Today's Date: 1/22/2024     Precautions  Precautions: (P) Cardiac Precautions (See Comments), Sternal Precautions (See Comments)    Assessment    Patient is 68 y.o. male seen s/p CABGx3 w/ a PMH of DLD, HTN, prediabetes and obesity. Pt normally independent with functional mobility and ADLs/IADLs at baseline living in a 2 story home alone, has friends/neighbors who can assist. Pt able to complete all functional mobility and ADLs with supervision, no AD required. Pt educated on sternal precautions, adaptive strategies for full body dressing and talk test for functional activities. Anticipate no further OT needs at this time, order will be completed.      Plan    DC Equipment Recommendations: (P) None  Discharge Recommendations: (P) Anticipate that the patient will have no further occupational therapy needs after discharge from the hospital     Objective       01/22/24 0947   Prior Living Situation   Prior Services Home-Independent   Housing / Facility 2 Story House   Steps Into Home 2   Steps In Home 15   Bathroom Set up Walk In Shower;Shower Chair   Equipment Owned Tub / Shower Seat   Lives with - Patient's Self Care Capacity Alone and Able to Care For Self   Prior Level of ADL Function   Self Feeding Independent   Grooming / Hygiene Independent   Bathing Independent   Dressing Independent   Toileting Independent   Prior Level of IADL Function   Medication Management Independent   Laundry Independent   Kitchen Mobility Independent   Finances Independent   Home Management Independent   Shopping Independent   Prior Level Of Mobility Independent Without Device in Community   Driving / Transportation Driving Independent   Occupation (Pre-Hospital Vocational) Employed Part Time   History of Falls   History of Falls No   Precautions   Precautions Cardiac Precautions (See Comments);Sternal  Precautions (See Comments)   Cognition    Cognition / Consciousness WDL   Level of Consciousness Alert   Active ROM Upper Body   Active ROM Upper Body  WDL   Dominant Hand Right   Comments One UE at a time   Strength Upper Body   Upper Body Strength  WDL   Sensation Upper Body   Upper Extremity Sensation  WDL   Upper Body Muscle Tone   Upper Body Muscle Tone  WDL   Neurological Concerns   Neurological Concerns No   Coordination Upper Body   Coordination WDL   Balance Assessment   Sitting Balance (Static) Good   Sitting Balance (Dynamic) Good   Standing Balance (Static) Fair +   Standing Balance (Dynamic) Fair +   Weight Shift Sitting Good   Weight Shift Standing Good   Comments no AD   Bed Mobility    Supine to Sit Supervised   Sit to Supine Supervised   Scooting Supervised   Rolling Supervised   ADL Assessment   Grooming Supervision;Standing   Upper Body Dressing Supervision   Lower Body Dressing Supervision   Toileting   (NT-refused need)   How much help from another person does the patient currently need...   Putting on and taking off regular lower body clothing? 4   Bathing (including washing, rinsing, and drying)? 4   Toileting, which includes using a toilet, bedpan, or urinal? 4   Putting on and taking off regular upper body clothing? 4   Taking care of personal grooming such as brushing teeth? 4   Eating meals? 4   6 Clicks Daily Activity Score 24   Functional Mobility   Sit to Stand Supervised   Bed, Chair, Wheelchair Transfer Supervised   Transfer Method Stand Step   Mobility bed mobility, in room mobility, back to supine   Comments no AD   Activity Tolerance   Sitting Edge of Bed 10 min   Standing 10 min   Education Group   Education Provided Role of Occupational Therapist;Sternal Precautions;Cardiac Precautions   Role of Occupational Therapist Patient Response Patient;Acceptance;Explanation   Cardiac Precautions Patient Response Patient;Acceptance;Explanation;Handout   Sternal Precautions Patient Response  Patient;Acceptance;Explanation;Handout   Problem List   Problem List None   Anticipated Discharge Equipment and Recommendations   DC Equipment Recommendations None   Discharge Recommendations Anticipate that the patient will have no further occupational therapy needs after discharge from the hospital   Interdisciplinary Plan of Care Collaboration   IDT Collaboration with  Nursing   Patient Position at End of Therapy In Bed;Bed Alarm On;Call Light within Reach;Tray Table within Reach;Phone within Reach   Collaboration Comments RN updated

## 2024-01-22 NOTE — CARE PLAN
The patient is Stable - Low risk of patient condition declining or worsening    Shift Goals  Clinical Goals: open heart care plan  Patient Goals: get home in a few days  Family Goals: GALLITO    Progress made toward(s) clinical / shift goals:    Problem: Knowledge Deficit - Standard  Goal: Patient and family/care givers will demonstrate understanding of plan of care, disease process/condition, diagnostic tests and medications  Outcome: Progressing     Problem: Pain - Standard  Goal: Alleviation of pain or a reduction in pain to the patient’s comfort goal  Outcome: Progressing     Problem: Skin Integrity  Goal: Skin integrity is maintained or improved  Outcome: Progressing     Problem: Fall Risk  Goal: Patient will remain free from falls  Outcome: Progressing     Problem: Post Op Day 3 CABG/Heart Valve replacement  Goal: Optimal care of the post op CABG/Heart Valve replacement post op day 3  Outcome: Progressing  Note: Daily weight recorded in flowsheets, IS in use while awake, daily walks completed, up in chair for meals. Alvarez hose in use daily and SCD in use nightly, I & O recorded, vitals checked q 4 hour, incision care complete, CHG complete.     Problem: Hemodynamics  Goal: Patient's hemodynamics, fluid balance and neurologic status will be stable or improve  Outcome: Progressing       Patient is not progressing towards the following goals:      Problem: Bowel Elimination - Post Surgical  Goal: Patient will resume regular bowel sounds and function with no discomfort or distention  Outcome: Not Progressing  Note: Bowel protocol in progress. Patient is passing gas. Abdomen semi firm and distended.  Plan to continue to follow bowel protocol.

## 2024-01-22 NOTE — PROGRESS NOTES
Bedside report received from night RN, pt care assumed, assessment completed. Pt is A&O4, pain 0, SR on the monitor. Updated on POC, questions answered. Bed in lowest, locked position, treaded socks on, call light and belongings within reach.   Midline incision still draining, moderate brown thick drainage from site. Changed dressing again.   Chest tube sites CDI, PAN. Suches sites CDI, PAN.

## 2024-01-22 NOTE — PROGRESS NOTES
Bedside report received from TISHA Gan. Patient is alert and oriented x  4, 2 L O2 via NC, SR on the monitor. Patient is up in the chair. Complains of severe pain to sternum and right shoulder. Fall precautions are in place. Call light is in reach.

## 2024-01-22 NOTE — PROGRESS NOTES
DENIS Chacon notified of thick moderate brown drainage coming from lower end of midline incision.

## 2024-01-22 NOTE — PROGRESS NOTES
Cardiovascular Surgery Progress Note    Name: Joe Morelos  MRN: [id number]  : 1955  Admit Date: 2024  5:01 AM  4 Days Post-Op     Procedure:  Procedure(s) and Anesthesia Type:   CORONARY ARTERY BYPASS GRAFT X3 WITH SKELETONIZED REYES TO LAD, RSVG TO OM1, RSVG TO PDA  ENDARTERECTOMY- RIGHT CORONARY ARTERY - Wound Class: Clean with Drain  ENDOSCOPIC VEIN HARVEST OF LEFT GREATER SAPHENOUS VEIN- Wound Class: Clean with Drain  ECHOCARDIOGRAM, TRANSESOPHAGEAL, INTRAOPERATIVE - Wound Class: Clean Contaminated    Vitals:  Vitals:    24 0335 24 0410 24 0750 24 0809   BP: 121/75   112/66   Pulse: 78  75 75   Resp: 17  18 17   Temp: 36.8 °C (98.2 °F)   36.8 °C (98.2 °F)   TempSrc: Temporal   Temporal   SpO2: 94%  96% 94%   Weight:  99.5 kg (219 lb 5.7 oz)     Height:          Temp (24hrs), Av.8 °C (98.3 °F), Min:36.7 °C (98.1 °F), Max:37.2 °C (99 °F)      Respiratory:    Respiration: 17, Pulse Oximetry: 94 %       Fluids:    Intake/Output Summary (Last 24 hours) at 2024 1128  Last data filed at 2024 0943  Gross per 24 hour   Intake 1360 ml   Output 4025 ml   Net -2665 ml       Admit weight: Weight: 93.4 kg (205 lb 14.6 oz)  Current weight: Weight: 99.5 kg (219 lb 5.7 oz) (24 0410)    Labs:  Recent Labs     24  0443 24  0044 24  0041   WBC 15.9* 13.2* 10.7   RBC 3.71* 3.48* 3.37*   HEMOGLOBIN 11.3* 10.7* 10.2*   HEMATOCRIT 32.8* 31.0* 29.7*   MCV 88.4 89.1 88.1   MCH 30.5 30.7 30.3   MCHC 34.5 34.5 34.3   RDW 43.8 43.6 42.8   PLATELETCT 199 205 233   MPV 9.0 9.3 9.0       Recent Labs     24  0443 24  0044 24  1035 24  0041   SODIUM 129* 131*  --  132*   POTASSIUM 4.0 4.1 3.5* 3.5*   CHLORIDE 93* 93*  --  93*   CO2 31 30  --  31   GLUCOSE 125* 114*  --  122*   BUN 15 17  --  15   CREATININE 0.61 0.77  --  0.68   CALCIUM 8.3* 8.2*  --  8.3*             HgbA1c:  6.0    Diabetic Educator Consult:  no    Medications:  Scheduled  Medications   Medication Dose Frequency    amiodarone  400 mg BID    furosemide  40 mg BID    potassium chloride SA  20 mEq BID    enoxaparin (LOVENOX) injection  40 mg DAILY AT 1800    Nozin nasal  swab  1 Applicator BID    acetaminophen  1,000 mg Q6HRS    senna-docusate  2 Tablet BID    And    polyethylene glycol/lytes  1 Packet DAILY    And    magnesium hydroxide  30 mL DAILY    omeprazole  20 mg DAILY    carvedilol  3.125 mg BID WITH MEALS    aspirin  81 mg DAILY    atorvastatin  40 mg QHS    clopidogrel  75 mg DAILY        Exam:   Physical Exam  Vitals and nursing note reviewed.   Constitutional:       General: He is not in acute distress.     Appearance: Normal appearance.   HENT:      Head: Normocephalic.      Nose: Nose normal. No congestion.      Mouth/Throat:      Mouth: Mucous membranes are moist.      Pharynx: Oropharynx is clear.   Eyes:      Pupils: Pupils are equal, round, and reactive to light.   Cardiovascular:      Rate and Rhythm: Normal rate and regular rhythm.      Pulses: Normal pulses.      Heart sounds: Normal heart sounds.   Pulmonary:      Effort: Pulmonary effort is normal.      Breath sounds: Decreased breath sounds present.   Abdominal:      General: Bowel sounds are decreased. There is no distension.      Palpations: Abdomen is soft.   Musculoskeletal:      Cervical back: Normal range of motion and neck supple. No tenderness.      Right lower leg: Edema present.      Left lower leg: Edema present.   Skin:     General: Skin is warm and dry.      Comments: Midline surgical incision- small amount serous drainage  L evh site- c/d/i   Neurological:      General: No focal deficit present.      Mental Status: He is alert and oriented to person, place, and time.   Psychiatric:         Mood and Affect: Mood normal.         Behavior: Behavior normal.         Thought Content: Thought content normal.         Cardiac Medications:    ASA - Yes    Plavix - Yes    Post-operative Beta Blockers  - Yes    Ace/ARB- No; contraindicated because of Normal EF    Statin - Yes    Aldactone- No; contraindicated because of Normal EF    SGLT2i-  No contraindicated because of No; contraindicated because of Normal EF    Ejection Fraction:  61%    Telemetry:   1/19 SR  1/20 SR  1/21 Afib RVR  1/22 SR    Assessment/Plan:  POD 1  on norepi, SR, neuro intact, wounds intact, abdomen soft, fluid balance positive, wt up, 3 L NC. 280 out of chest tubes overnight.  Plan:  Keep chest tubes and pacing wires. 1 g CaCl. Diurese when able. IS/ambulate. CPM.    POD 2 HDS, SR- d/c pacer wires, sternal drainage - most likely edema- diurese, wean O2, amb, enc IS, transfer to tele    POD 3 HDS, Afib RVR- starting amio protocol, sternal drainage - minimal, diuresing well- cont, wean oxygen    POD 4 HDS, SR- om PO amio, diuresing well- cont, replace K+, sternum- dark bloody drainage- start antibiotics, wean oxygen- home in 1-2 days    Disposition:  PT- no needs

## 2024-01-23 ENCOUNTER — PHARMACY VISIT (OUTPATIENT)
Dept: PHARMACY | Facility: MEDICAL CENTER | Age: 69
End: 2024-01-23
Payer: COMMERCIAL

## 2024-01-23 VITALS
HEIGHT: 70 IN | DIASTOLIC BLOOD PRESSURE: 83 MMHG | RESPIRATION RATE: 18 BRPM | HEART RATE: 86 BPM | TEMPERATURE: 97.3 F | BODY MASS INDEX: 30.68 KG/M2 | SYSTOLIC BLOOD PRESSURE: 143 MMHG | WEIGHT: 214.29 LBS | OXYGEN SATURATION: 93 %

## 2024-01-23 LAB
ANION GAP SERPL CALC-SCNC: 13 MMOL/L (ref 7–16)
BUN SERPL-MCNC: 11 MG/DL (ref 8–22)
CALCIUM SERPL-MCNC: 8.5 MG/DL (ref 8.5–10.5)
CHLORIDE SERPL-SCNC: 97 MMOL/L (ref 96–112)
CO2 SERPL-SCNC: 28 MMOL/L (ref 20–33)
CREAT SERPL-MCNC: 0.64 MG/DL (ref 0.5–1.4)
ERYTHROCYTE [DISTWIDTH] IN BLOOD BY AUTOMATED COUNT: 42.5 FL (ref 35.9–50)
GFR SERPLBLD CREATININE-BSD FMLA CKD-EPI: 103 ML/MIN/1.73 M 2
GLUCOSE SERPL-MCNC: 117 MG/DL (ref 65–99)
HCT VFR BLD AUTO: 30.2 % (ref 42–52)
HGB BLD-MCNC: 10.5 G/DL (ref 14–18)
MCH RBC QN AUTO: 30.6 PG (ref 27–33)
MCHC RBC AUTO-ENTMCNC: 34.8 G/DL (ref 32.3–36.5)
MCV RBC AUTO: 88 FL (ref 81.4–97.8)
PLATELET # BLD AUTO: 269 K/UL (ref 164–446)
PMV BLD AUTO: 8.7 FL (ref 9–12.9)
POTASSIUM SERPL-SCNC: 3.6 MMOL/L (ref 3.6–5.5)
RBC # BLD AUTO: 3.43 M/UL (ref 4.7–6.1)
SODIUM SERPL-SCNC: 138 MMOL/L (ref 135–145)
WBC # BLD AUTO: 8 K/UL (ref 4.8–10.8)

## 2024-01-23 PROCEDURE — 85027 COMPLETE CBC AUTOMATED: CPT

## 2024-01-23 PROCEDURE — 700102 HCHG RX REV CODE 250 W/ 637 OVERRIDE(OP): Performed by: NURSE PRACTITIONER

## 2024-01-23 PROCEDURE — RXMED WILLOW AMBULATORY MEDICATION CHARGE: Performed by: NURSE PRACTITIONER

## 2024-01-23 PROCEDURE — A9270 NON-COVERED ITEM OR SERVICE: HCPCS | Performed by: NURSE PRACTITIONER

## 2024-01-23 PROCEDURE — 80048 BASIC METABOLIC PNL TOTAL CA: CPT

## 2024-01-23 PROCEDURE — 700111 HCHG RX REV CODE 636 W/ 250 OVERRIDE (IP): Mod: JZ | Performed by: NURSE PRACTITIONER

## 2024-01-23 PROCEDURE — 99024 POSTOP FOLLOW-UP VISIT: CPT | Performed by: NURSE PRACTITIONER

## 2024-01-23 PROCEDURE — 36415 COLL VENOUS BLD VENIPUNCTURE: CPT

## 2024-01-23 RX ORDER — ACETAMINOPHEN 500 MG
1000 TABLET ORAL EVERY 6 HOURS PRN
Qty: 30 TABLET | Refills: 0 | COMMUNITY
Start: 2024-01-28 | End: 2024-01-23

## 2024-01-23 RX ORDER — ACETAMINOPHEN 500 MG
1000 TABLET ORAL EVERY 6 HOURS PRN
Qty: 30 TABLET | Refills: 0 | COMMUNITY
Start: 2024-01-28

## 2024-01-23 RX ORDER — AMOXICILLIN AND CLAVULANATE POTASSIUM 875; 125 MG/1; MG/1
1 TABLET, FILM COATED ORAL EVERY 12 HOURS
Qty: 18 TABLET | Refills: 0 | Status: ACTIVE | OUTPATIENT
Start: 2024-01-23 | End: 2024-01-23

## 2024-01-23 RX ORDER — FUROSEMIDE 20 MG/1
20 TABLET ORAL DAILY
Qty: 30 TABLET | Refills: 1 | Status: SHIPPED | OUTPATIENT
Start: 2024-01-23 | End: 2024-02-15

## 2024-01-23 RX ORDER — AMIODARONE HYDROCHLORIDE 200 MG/1
400 TABLET ORAL 2 TIMES DAILY
Qty: 56 TABLET | Refills: 1 | Status: SHIPPED | OUTPATIENT
Start: 2024-01-23 | End: 2024-01-23

## 2024-01-23 RX ORDER — AMIODARONE HYDROCHLORIDE 200 MG/1
TABLET ORAL
Qty: 56 TABLET | Refills: 0 | Status: SHIPPED | OUTPATIENT
Start: 2024-01-23 | End: 2024-02-15

## 2024-01-23 RX ORDER — ATORVASTATIN CALCIUM 40 MG/1
40 TABLET, FILM COATED ORAL
Qty: 30 TABLET | Refills: 2 | Status: SHIPPED | OUTPATIENT
Start: 2024-01-23 | End: 2024-02-15 | Stop reason: SDUPTHER

## 2024-01-23 RX ORDER — AMOXICILLIN AND CLAVULANATE POTASSIUM 875; 125 MG/1; MG/1
1 TABLET, FILM COATED ORAL EVERY 12 HOURS
Qty: 18 TABLET | Refills: 0 | Status: ACTIVE | OUTPATIENT
Start: 2024-01-23 | End: 2024-02-01

## 2024-01-23 RX ORDER — CLOPIDOGREL BISULFATE 75 MG/1
75 TABLET ORAL DAILY
Qty: 30 TABLET | Refills: 2 | Status: SHIPPED | OUTPATIENT
Start: 2024-01-24 | End: 2024-02-15 | Stop reason: SDUPTHER

## 2024-01-23 RX ORDER — OMEPRAZOLE 20 MG/1
20 CAPSULE, DELAYED RELEASE ORAL DAILY
Qty: 30 CAPSULE | Refills: 2 | Status: SHIPPED | OUTPATIENT
Start: 2024-01-24 | End: 2024-01-23

## 2024-01-23 RX ORDER — OMEPRAZOLE 20 MG/1
20 CAPSULE, DELAYED RELEASE ORAL DAILY
Qty: 30 CAPSULE | Refills: 2 | Status: SHIPPED | OUTPATIENT
Start: 2024-01-24

## 2024-01-23 RX ORDER — POTASSIUM CHLORIDE 750 MG/1
10 TABLET, EXTENDED RELEASE ORAL DAILY
Qty: 30 TABLET | Refills: 1 | Status: SHIPPED | OUTPATIENT
Start: 2024-01-23 | End: 2024-02-15

## 2024-01-23 RX ORDER — CLOPIDOGREL BISULFATE 75 MG/1
75 TABLET ORAL DAILY
Qty: 30 TABLET | Refills: 2 | Status: SHIPPED | OUTPATIENT
Start: 2024-01-24 | End: 2024-01-23

## 2024-01-23 RX ORDER — CARVEDILOL 3.12 MG/1
3.12 TABLET ORAL 2 TIMES DAILY WITH MEALS
Qty: 60 TABLET | Refills: 2 | Status: SHIPPED | OUTPATIENT
Start: 2024-01-23 | End: 2024-02-14

## 2024-01-23 RX ADMIN — ACETAMINOPHEN 1000 MG: 500 TABLET ORAL at 00:16

## 2024-01-23 RX ADMIN — BISACODYL 10 MG: 10 SUPPOSITORY RECTAL at 05:16

## 2024-01-23 RX ADMIN — CARVEDILOL 3.12 MG: 3.12 TABLET, FILM COATED ORAL at 08:23

## 2024-01-23 RX ADMIN — AMIODARONE HYDROCHLORIDE 400 MG: 200 TABLET ORAL at 11:27

## 2024-01-23 RX ADMIN — ACETAMINOPHEN 1000 MG: 500 TABLET ORAL at 05:15

## 2024-01-23 RX ADMIN — ASPIRIN 81 MG: 81 TABLET, COATED ORAL at 05:15

## 2024-01-23 RX ADMIN — OMEPRAZOLE 20 MG: 20 CAPSULE, DELAYED RELEASE ORAL at 05:16

## 2024-01-23 RX ADMIN — FUROSEMIDE 40 MG: 10 INJECTION, SOLUTION INTRAMUSCULAR; INTRAVENOUS at 05:16

## 2024-01-23 RX ADMIN — CLOPIDOGREL BISULFATE 75 MG: 75 TABLET ORAL at 05:16

## 2024-01-23 RX ADMIN — POTASSIUM CHLORIDE 20 MEQ: 20 TABLET, EXTENDED RELEASE ORAL at 05:15

## 2024-01-23 RX ADMIN — Medication 1 APPLICATOR: at 08:23

## 2024-01-23 RX ADMIN — POTASSIUM CHLORIDE 20 MEQ: 20 TABLET, EXTENDED RELEASE ORAL at 11:27

## 2024-01-23 RX ADMIN — ACETAMINOPHEN 1000 MG: 500 TABLET ORAL at 11:28

## 2024-01-23 RX ADMIN — AMOXICILLIN AND CLAVULANATE POTASSIUM 1 TABLET: 875; 125 TABLET, FILM COATED ORAL at 05:16

## 2024-01-23 ASSESSMENT — PAIN DESCRIPTION - PAIN TYPE: TYPE: ACUTE PAIN

## 2024-01-23 ASSESSMENT — FIBROSIS 4 INDEX: FIB4 SCORE: 1.25

## 2024-01-23 NOTE — CARE PLAN
"The patient is Stable - Low risk of patient condition declining or worsening    Shift Goals  Clinical Goals: wean O2, BM  Patient Goals: BM, discharge  Family Goals: GALLITO    Progress made toward(s) clinical / shift goals:      Patient is not progressing towards the following goals:      Problem: Post Op Day 5 CABG/Heart Valve Replacement/ DISCHARGE DAY  Goal: Optimal care of the Post Op CABG/Heart Valve replacement Post Op Day 5  Outcome: Progressing  Intervention: Daily weights in the morning  Note: Daily weight obtained at 0503 - 97.2kg  Intervention: Shower daily and clean incisions twice daily with soap and water  Note: Patient instructed on how to clean incisions when discharged. Patient demonstrated understanding   Intervention: Up in chair for all meals  Note: Patient up in chair since 0730.  Intervention: Ambulate 4 times daily, increasing the distance each time  Note: Patient has been ambulating on the floor by himself.   Intervention: IS q 1 hour while awake and record best IS volume  Note: 2000 on IS   Intervention: Complete \"Home O2 Assessment' in vitals flowsheets if unable to wean off O2  Note: Patient does not require home oxygen when discharged   Intervention: Consider removal of perez, chest tube and pacer wires if not already done  Note: All tubes and wires have been removed.   Intervention: Discharge Education  Note: This RN will educate patient on discharge instructions. Patient demonstrates understanding.        "

## 2024-01-23 NOTE — DISCHARGE SUMMARY
DISCHARGE SUMMARY    ADMISSION DATE: 1/18/2024    DISCHARGE DATE: 1/23/2024    ADMITTING DIAGNOSES: Angina pectoris, multivessel coronary artery disease, hypertension, hyperlipidemia, history of peptic ulcer disease, history of pancreatic lesion     DISCHARGE DIAGNOSES: Angina pectoris, multivessel coronary artery disease, hypertension, hyperlipidemia, history of peptic ulcer disease, history of pancreatic lesion     PROCEDURES PERFORMED:   Dr. Bacon 1/18/2024  CORONARY ARTERY BYPASS GRAFT X3 WITH SKELETONIZED REYES TO LAD, RSVG TO OM1, RSVG TO PDA  ENDARTERECTOMY- RIGHT CORONARY ARTERY - Wound Class: Clean with Drain  ENDOSCOPIC VEIN HARVEST OF LEFT GREATER SAPHENOUS VEIN- Wound Class: Clean with Drain  ECHOCARDIOGRAM, TRANSESOPHAGEAL, INTRAOPERATIVE    HISTORY OF PRESENT ILLNESS:    The patient is a 68 y.o. male with past medical history of dyslipidemia, hypertension, prediabetes and obesity who presents with chest pain on exertion. His symptoms are relived with rest. He had an angiogram that revealed multivessel coronary artery disease. He is retired and owns a bank.  He used to walk five miles a day but has recently slowed down.     HOSPITAL COURSE:   POD 1  on norepi, SR, neuro intact, wounds intact, abdomen soft, fluid balance positive, wt up, 3 L NC. 280 out of chest tubes overnight.  Plan:  Keep chest tubes and pacing wires. 1 g CaCl. Diurese when able. IS/ambulate. CPM.     POD 2 HDS, SR- d/c pacer wires, sternal drainage - most likely edema- diurese, wean O2, amb, enc IS, transfer to tele     POD 3 HDS, Afib RVR- starting amio protocol, sternal drainage - minimal, diuresing well- cont, wean oxygen     POD 4 HDS, SR- om PO amio, diuresing well- cont, replace K+, sternum- dark bloody drainage- start antibiotics, wean oxygen- home in 1-2 days    POD 5 HDS, SR- on po amio, diuresed well-0 below admit weight, Sternum- intact- small amount of serous drainage- wound closed with steri-strips- wound check  visit in 1 week, instructed to call if any concerns sooner including erythema, purulent drainage or increase in drainage, room air, ambulating, plan home today    TELEMETRY:  1/19 SR  1/20 SR  1/21 Afib RVR  1/22 SR  1/23 SR    RECENT LABS:     Lab Results   Component Value Date/Time    SODIUM 138 01/23/2024 12:29 AM    POTASSIUM 3.6 01/23/2024 12:29 AM    CHLORIDE 97 01/23/2024 12:29 AM    CO2 28 01/23/2024 12:29 AM    GLUCOSE 117 (H) 01/23/2024 12:29 AM    BUN 11 01/23/2024 12:29 AM    CREATININE 0.64 01/23/2024 12:29 AM      Lab Results   Component Value Date/Time    WBC 8.0 01/23/2024 12:29 AM    RBC 3.43 (L) 01/23/2024 12:29 AM    HEMOGLOBIN 10.5 (L) 01/23/2024 12:29 AM    HEMATOCRIT 30.2 (L) 01/23/2024 12:29 AM    MCV 88.0 01/23/2024 12:29 AM    MCH 30.6 01/23/2024 12:29 AM    MCHC 34.8 01/23/2024 12:29 AM    MPV 8.7 (L) 01/23/2024 12:29 AM    NEUTSPOLYS 68.40 01/15/2024 01:11 PM    LYMPHOCYTES 23.00 01/15/2024 01:11 PM    MONOCYTES 5.80 01/15/2024 01:11 PM    EOSINOPHILS 1.70 01/15/2024 01:11 PM    BASOPHILS 0.70 01/15/2024 01:11 PM      Lab Results   Component Value Date/Time    PROTHROMBTM 13.5 01/15/2024 01:11 PM    INR 1.02 01/15/2024 01:11 PM        Fluids:    Intake/Output Summary (Last 24 hours) at 1/23/2024 0828  Last data filed at 1/23/2024 0825  Gross per 24 hour   Intake 640 ml   Output 4000 ml   Net -3360 ml     Admit weight: Weight: 93.4 kg (205 lb 14.6 oz)  Current weight: Weight: 97.2 kg (214 lb 4.6 oz) (01/23/24 9554)    ALLERGIES:     Patient has no known allergies.    EJECTION FRACTION:  60%    CARDIAC MEDICATIONS:    ASA - Yes    Plavix - Yes    Post-operative Beta Blockers - Yes    Ace/ARB- No; contraindicated because of Normal EF    Statin - Yes    Aldactone- No; contraindicated because of Normal EF    SGLT2i-  No contraindicated because of No; contraindicated because of Normal EF    DISCHARGE MEDICATIONS:      Medication List        START taking these medications        Instructions    acetaminophen 500 MG Tabs  Start taking on: January 28, 2024  Commonly known as: Tylenol   Take 2 Tablets by mouth every 6 hours as needed for Mild Pain.  Dose: 1,000 mg     amiodarone 200 MG Tabs  Commonly known as: Cordarone   Take 2 Tablets by mouth 2 times a day. x 7 days, Then Take Two Tablets daily for 7 days, Then Take 1 Tablet Daily.  Dose: 400 mg     amoxicillin-clavulanate 875-125 MG Tabs  Commonly known as: Augmentin   Take 1 Tablet by mouth every 12 hours for 9 days.  Dose: 1 Tablet     clopidogrel 75 MG Tabs  Start taking on: January 24, 2024  Commonly known as: Plavix   Take 1 Tablet by mouth every day.  Dose: 75 mg     furosemide 20 MG Tabs  Commonly known as: Lasix   Take 1 Tablet by mouth every day.  Dose: 20 mg     omeprazole 20 MG delayed-release capsule  Start taking on: January 24, 2024  Commonly known as: PriLOSEC   Take 1 Capsule by mouth every day.  Dose: 20 mg     potassium chloride SA 10 MEQ Tbcr  Commonly known as: K-Dur   Take 1 Tablet by mouth every day.  Dose: 10 mEq            CHANGE how you take these medications        Instructions   atorvastatin 40 MG Tabs  What changed:   medication strength  how much to take  when to take this  Another medication with the same name was removed. Continue taking this medication, and follow the directions you see here.  Commonly known as: Lipitor   Take 1 Tablet by mouth at bedtime.  Dose: 40 mg            CONTINUE taking these medications        Instructions   aspirin 81 MG EC tablet   Take 1 Tablet by mouth every day.  Dose: 81 mg     carvedilol 3.125 MG Tabs  Commonly known as: Coreg   Take 1 Tablet by mouth 2 times a day with meals.  Dose: 3.125 mg            STOP taking these medications      VIAGRA PO            DIET:   Cardiac diet    DISCHARGE INSTRUCTIONS DISCUSSED WITH THE PATIENT:      1. NO driving for 4 weeks after surgery. You may ride as a passenger.  2. NO lifting of any item over 10 lbs (e.g. gallon of milk) for 6 weeks after  surgery.  3. DO walk as much as possible! Walk a minimum of once a day. Depending on your fatigue and comfort level, you may walk as much as you wish. There is no maximum.  4. Other physical activities (sex, housework, gardening, etc.) are OK after 4 weeks   5. Continue using incentive spirometer for 2 weeks, especially if going home on oxygen.    Incision Care:  1. SHOWER ONLY - no baths. Clean incision daily with plain Ivory ® soap or any other dye or perfume free soap. Then pat incision dry with clean towel. Avoid creams or lotions on the incision(s).  a. If there is any increase in redness or swelling, or separation of the incision line, or thick drainage* from any of the incisions, call right away  * Clear, thin drainage is not abnormal especially from the leg incision and/or                         chest tube sites.  2. Continue to wear your TACHO Stockings for 4 weeks. You may take off the stockings when in bed or when the legs are elevated.    [phone]  if any increased shortness of breath, uncontrolled pain, weight gain greater than 3 pounds in 1 day or 5 pounds in 1 week, SBP >140, HR <60 or redness swelling or drainage of incisions.      FOLLOW-UP:   Future Appointments   Date Time Provider Department Center   1/29/2024  1:00 PM CT RESOURCE PROVIDER CT None   1/29/2024  2:40 PM MABLE Al   2/15/2024  8:45 AM BILLIE Escobar None   2/20/2024 12:45 PM CT RESOURCE PROVIDER [facility] None   3/1/2024  4:40 PM MABLE Al   3/4/2024  8:00 AM YUDELKA Mayorga   12/3/2024  2:45 PM MABLE Beltrán None

## 2024-01-23 NOTE — DISCHARGE PLANNING
HTH/SCP TCN chart review completed. Collaborated with SCOTT Quevedo. Current discharge considerations are for Home with close outpatient f/u and outpatient cardiac rehab as directed by physician when medically cleared.  No new TCN needs.  TCN will continue to follow and collaborate with discharge planning team as additional post acute needs arise. Thank you.    Completed:  PT recommends (OP cardiac rehab as indicated) on 1/21/24.    OT recommends- no further occupational therapy needs on 1/22/24 after discharge from the hospital    Choice obtained:  DME (O2) if indicated.  SCP with [facility] PCP.   Referred to  for Follow up appointment.

## 2024-01-23 NOTE — DISCHARGE INSTRUCTIONS
DIVISION OF CARDIAC SURGERY   DISCHARGE INSTRUCTIONS    Activity:    NO driving for 4 weeks after surgery. You may ride as a passenger.  NO lifting, pushing, or pulling more than 10 pounds for 6 weeks.  For the next 6 weeks, keep your elbows close to your body and move within a pain-free motion when lifting, pushing or pulling.  Do not stretch both arms backwards at the same time.    Walk at least 4 times per day, there is no maximum. The goal is to increase your distance over time.  Continue using incentive spirometer for 2 weeks or until your baseline volume is reached.  If you are going home on oxygen and you were not on oxygen prior to surgery, keep using until you are oxygen free.  Weigh yourself daily.  Call your Cardiologist for a weight gain of 3 or more pounds in 1 day or more than 5 pounds in 7 days.  Take all of your medications as prescribed. Do not use a pill box for the first month at home. If you have questions, please call your nurse navigator at [phone].  Continue to wear the TACHO (compression) stockings for 2-4 weeks or until all swelling is gone. You may take them off when you are in bed or when your legs are elevated.    Incision Care:    Make sure to clean your incision(s) TWICE DAILY.  Once by showering AND once using the no rinse Foam cleanser provided in the hospital.  During the shower, cleanse the incision(s) with a perfume and dye free soap (Dial, Dove, [place])  Use gentle pressure and rub up and down over incision with your hands or a washcloth. Rinse off and pat incision(s) dry with clean towel.  Keep the incision open to air. No creams or lotions on your incision(s). No baths.  If there is any increased redness or swelling, separation of the incision line, or thick drainage from any of your incisions, call the Cardiac Surgeons ([phone]).      General Instructions:    You have been referred to Cardiac Rehab.  You can start Cardiac Rehab 30 days after surgery.  If you do  not have an appointment at the time of discharge call [phone] to schedule an appointment.  Your Primary Care Doctor typically handles home oxygen. Oxygen may be stopped when your oxygen level is consistently greater than 90.  Check with your Primary Care Doctor if you are unsure.  Take all of your medications (including pain medications) as prescribed.  Taking medications other than prescribed can result in serious injury.    For Patients Discharged with Narcotic Pain Medication:     If a refill is needed, understand that only 1 refill will be provided and you must come to the Cardiac Surgeons’ office for an appointment (72 hours’ notice is required to schedule and there are no weekend appointments).  If the pain medications you are discharged on are not working, you will need to bring your remaining prescription into the office in order to receive a new prescription.  If you were taking narcotics prior to your heart surgery, the Cardiac Surgeons will provide you with one prescription and additional medications will need to be provided by your pain management doctor.  Do not drink alcohol while taking narcotics.  Lost or stolen medications will not be refilled.  If medications are stolen, report to law enforcement.    Contact Cardiac Surgery at [phone] if you have any questions.

## 2024-01-23 NOTE — CARE PLAN
"The patient is Stable - Low risk of patient condition declining or worsening    Shift Goals  Clinical Goals: wean o2, dc amio gtt, remain in SR, increase IS goal, BM  Patient Goals: \"anything I need to get home\"  Family Goals: GALLITO    Progress made toward(s) clinical / shift goals:      Problem: Skin Integrity  Goal: Skin integrity is maintained or improved  Outcome: Progressing   Qshift skin check in place. Pt with moderate drainage from midline incision. DENIS Stokes cleaned site and applied steristrips. Starting pt on antibiotics and getting a repeat chest xray. Monitoring pt for additional drainage.     Problem: Post Op Day 4 CABG/Heart Valve Replacement  Goal: Optimal care of the Post Op CABG/Heart Valve replacement Post Op Day 4  Outcome: Progressing  Note: Daily weight completed. Shower completed. Up to chair for all meals. X2 ambulation for shift complete. Pt self motivated with IS use. Best 1700 today. Pt with moderate drainage from midline incision. DENIS Stokes cleaned site and applied steristrips. Starting pt on antibiotics and getting a repeat chest xray. Monitoring pt for additional drainage.        Patient is not progressing towards the following goals:      "

## 2024-01-23 NOTE — PROGRESS NOTES
Patient being discharged. Patient educated on discharge instructions and new prescriptions. Patient verbalized understanding. Follow up appt made with CT surg for wound. PIV removed, telemetry monitor checked in. Patient going home with friend.

## 2024-01-23 NOTE — CARE PLAN
The patient is Stable - Low risk of patient condition declining or worsening    Shift Goals  Clinical Goals: wean O2, BM  Patient Goals: BM, discharge  Family Goals: GALLITO    Progress made toward(s) clinical / shift goals:    Problem: Knowledge Deficit - Standard  Goal: Patient and family/care givers will demonstrate understanding of plan of care, disease process/condition, diagnostic tests and medications  Outcome: Progressing     Problem: Pain - Standard  Goal: Alleviation of pain or a reduction in pain to the patient’s comfort goal  Outcome: Progressing     Problem: Skin Integrity  Goal: Skin integrity is maintained or improved  Outcome: Progressing     Problem: Fall Risk  Goal: Patient will remain free from falls  Outcome: Progressing     Problem: Post Op Day 5 CABG/Heart Valve Replacement  Goal: Optimal care of the Post Op CABG/Heart Valve replacement Post Op Day 5  Outcome: Progressing  Note: Daily weights recorded in flow sheets, IS in use while awake, Up in chair for meals, ambulating independently around the unit, CHG complete, incision care complete, Alvarez hose in use daily, SCD in use overnight.     Problem: Respiratory  Goal: Patient will achieve/maintain optimum respiratory ventilation and gas exchange  Outcome: Progressing       Patient is not progressing towards the following goals:

## 2024-01-25 NOTE — PROGRESS NOTES
CHIEF COMPLAINT: Wound Check    HPI: Patient is status post CABG x3 with Dr. Bacon 1/18/2024 who presents to clinic for a wound check.  He reports that his sternal incision has been draining serosanguineous fluid since he left the hospital.  He was discharged on Augmentin.  He reports the discharge has not slowed down since he left the hospital.    PHYSICAL EXAM:   Cardiac: S1S2  Neuro:  AAO x 3  Resp:  CTA  Wounds: Quarter inch gap in incision at distal end.  No tunneling.  No erythema.  Moderately soaked dressing removed during exam.  Small amount of serous fluid milked out of incision.  Sternum:  Stable    Vitals:    01/26/24 1354   BP: 130/72   Pulse: 88   Temp: 36.6 °C (97.9 °F)   SpO2: 95%         PLAN: Sternal incision covered with Steri-Strips.  Patient to continue antibiotics and furosemide as prescribed.  He is instructed to continue cleaning his sternal incision twice a day and to replace Steri-Strips as they fall off.  Patient to call on Monday if no improvement.

## 2024-01-26 ENCOUNTER — OFFICE VISIT (OUTPATIENT)
Dept: CARDIOTHORACIC SURGERY | Facility: MEDICAL CENTER | Age: 69
End: 2024-01-26
Payer: MEDICARE

## 2024-01-26 VITALS
BODY MASS INDEX: 29.62 KG/M2 | HEIGHT: 70 IN | SYSTOLIC BLOOD PRESSURE: 130 MMHG | TEMPERATURE: 97.9 F | DIASTOLIC BLOOD PRESSURE: 72 MMHG | OXYGEN SATURATION: 95 % | HEART RATE: 88 BPM | WEIGHT: 206.9 LBS

## 2024-01-26 DIAGNOSIS — I25.84 CORONARY ARTERY DISEASE DUE TO CALCIFIED CORONARY LESION: ICD-10-CM

## 2024-01-26 DIAGNOSIS — I25.10 CORONARY ARTERY DISEASE DUE TO CALCIFIED CORONARY LESION: ICD-10-CM

## 2024-01-26 PROCEDURE — 99024 POSTOP FOLLOW-UP VISIT: CPT | Performed by: NURSE PRACTITIONER

## 2024-01-26 PROCEDURE — 3078F DIAST BP <80 MM HG: CPT | Performed by: NURSE PRACTITIONER

## 2024-01-26 PROCEDURE — 3075F SYST BP GE 130 - 139MM HG: CPT | Performed by: NURSE PRACTITIONER

## 2024-01-26 ASSESSMENT — FIBROSIS 4 INDEX: FIB4 SCORE: 1.25

## 2024-01-29 ENCOUNTER — OFFICE VISIT (OUTPATIENT)
Dept: MEDICAL GROUP | Facility: MEDICAL CENTER | Age: 69
End: 2024-01-29
Payer: MEDICARE

## 2024-01-29 VITALS
HEART RATE: 84 BPM | HEIGHT: 70 IN | SYSTOLIC BLOOD PRESSURE: 128 MMHG | WEIGHT: 208 LBS | DIASTOLIC BLOOD PRESSURE: 78 MMHG | TEMPERATURE: 98 F | BODY MASS INDEX: 29.78 KG/M2 | OXYGEN SATURATION: 96 %

## 2024-01-29 DIAGNOSIS — Z09 HOSPITAL DISCHARGE FOLLOW-UP: Primary | ICD-10-CM

## 2024-01-29 DIAGNOSIS — I25.119 ATHEROSCLEROSIS OF NATIVE CORONARY ARTERY OF NATIVE HEART WITH ANGINA PECTORIS (HCC): ICD-10-CM

## 2024-01-29 DIAGNOSIS — I48.91 ATRIAL FIBRILLATION, UNSPECIFIED TYPE (HCC): ICD-10-CM

## 2024-01-29 DIAGNOSIS — Z95.1 HX OF CABG: ICD-10-CM

## 2024-01-29 DIAGNOSIS — Z87.11 HISTORY OF PEPTIC ULCER: ICD-10-CM

## 2024-01-29 PROCEDURE — 99214 OFFICE O/P EST MOD 30 MIN: CPT | Performed by: STUDENT IN AN ORGANIZED HEALTH CARE EDUCATION/TRAINING PROGRAM

## 2024-01-29 PROCEDURE — 3078F DIAST BP <80 MM HG: CPT | Performed by: STUDENT IN AN ORGANIZED HEALTH CARE EDUCATION/TRAINING PROGRAM

## 2024-01-29 PROCEDURE — 3074F SYST BP LT 130 MM HG: CPT | Performed by: STUDENT IN AN ORGANIZED HEALTH CARE EDUCATION/TRAINING PROGRAM

## 2024-01-29 ASSESSMENT — ENCOUNTER SYMPTOMS
WHEEZING: 0
FALLS: 0
COUGH: 0
MYALGIAS: 0
SORE THROAT: 0
BLURRED VISION: 0
SHORTNESS OF BREATH: 0
DIZZINESS: 0
HEADACHES: 0
BLOOD IN STOOL: 0
HEARTBURN: 0
PALPITATIONS: 0
FEVER: 0
NAUSEA: 0
DEPRESSION: 0

## 2024-01-29 ASSESSMENT — FIBROSIS 4 INDEX: FIB4 SCORE: 1.25

## 2024-01-29 NOTE — PROGRESS NOTES
Subjective:     Joe Morelos is a 68 y.o. male who presents for Hospital Follow-up.    Transitional Care Management          HPI:   Recently hospitalized for coronary artery bypass graft triple-vessel.  Patient has a history of angina pectoris with multivessel coronary artery disease, hypertension, hyperlipidemia.  Patient had a nuclear stress test done when he had a reversible perfusion defect.  Patient was referred to cardiologist and underwent left heart catheterization on 12/21/2023 when he was found to have multivessel disease.  Patient was admitted to hospital on 01/18/2024   CABG 3v.  Patient tolerated the procedure well.  Postprocedure patient was having mild edema and received diuresis.  He was transferred to telemetry floor for observation.  On day 3 postop he developed A-fib with RVR.  He was started on amnio drone protocol and was diuresed.  Patient's current medications include DAPT therapy with aspirin 81 mg daily and Plavix 75 mg.  He is also on Coreg 3.125 mg twice daily, Lipitor 40 mg daily.  For A-fib patient was started on amiodarone currently taking 400 mg twice daily and is switching to 400 once a day tomorrow.  He is supposed to start taking 200 mg daily after February 6  D from feb 6th.    The patient reports he is doing much better.  He has slowly started walking and has been increasing the distance.  Currently he is walking about 2 miles a day.  Denies any acute chest pain or shortness of breath or dizziness symptoms.  I have recommended him to divide his physical activity walking/2-4 times a day as tolerated.  Continue the discharge instructions as provided.  Follow-up with cardiology and cardiothoracic surgeon he has an appointment with cardiology on 15 February admitted to the cardiothoracic surgery on 20 February.         Current medicines (including reconciliation performed today)  Current Outpatient Medications   Medication Sig Dispense Refill    atorvastatin (LIPITOR) 40 MG Tab  Take 1 Tablet by mouth at bedtime. 30 Tablet 2    carvedilol (COREG) 3.125 MG Tab Take 1 Tablet by mouth 2 times a day with meals. 60 Tablet 2    furosemide (LASIX) 20 MG Tab Take 1 Tablet by mouth every day. 30 Tablet 1    potassium chloride SA (K-DUR) 10 MEQ Tab CR Take 1 Tablet by mouth every day. 30 Tablet 1    acetaminophen (TYLENOL) 500 MG Tab Take 2 Tablets by mouth every 6 hours as needed for Mild Pain. 30 Tablet 0    amiodarone (CORDARONE) 200 MG Tab Take 2 Tablets by mouth 2 times a day for 7 days, THEN 2 Tablets every day for 7 days, THEN 1 Tablet every day thereafter. 56 Tablet 0    amoxicillin-clavulanate (AUGMENTIN) 875-125 MG Tab Take 1 Tablet by mouth every 12 hours for 9 days. 18 Tablet 0    clopidogrel (PLAVIX) 75 MG Tab Take 1 Tablet by mouth every day. 30 Tablet 2    omeprazole (PRILOSEC) 20 MG delayed-release capsule Take 1 Capsule by mouth every day. 30 Capsule 2    aspirin 81 MG EC tablet Take 1 Tablet by mouth every day. 100 Tablet 2     No current facility-administered medications for this visit.       Allergies:   Patient has no known allergies.    Social History     Tobacco Use    Smoking status: Never    Smokeless tobacco: Never   Vaping Use    Vaping Use: Never used   Substance Use Topics    Alcohol use: Yes     Alcohol/week: 0.6 oz     Types: 1 Shots of liquor per week     Comment: once a month    Drug use: Not Currently       ROS:  Review of Systems   Constitutional:  Negative for fever and malaise/fatigue.   HENT:  Negative for congestion and sore throat.    Eyes:  Negative for blurred vision.   Respiratory:  Negative for cough, shortness of breath and wheezing.    Cardiovascular:  Negative for chest pain, palpitations and leg swelling.   Gastrointestinal:  Negative for blood in stool, heartburn and nausea.   Genitourinary:  Negative for dysuria and urgency.   Musculoskeletal:  Negative for falls and myalgias.   Neurological:  Negative for dizziness and headaches.  "  Psychiatric/Behavioral:  Negative for depression and suicidal ideas.          Objective:     Vitals:    01/29/24 1433   BP: 128/78   BP Location: Left arm   Patient Position: Sitting   BP Cuff Size: Adult   Pulse: 84   Temp: 36.7 °C (98 °F)   TempSrc: Temporal   SpO2: 96%   Weight: 94.3 kg (208 lb)   Height: 1.778 m (5' 10\")     Body mass index is 29.84 kg/m².    Physical Exam:  Physical Exam  Constitutional:       General: He is not in acute distress.     Appearance: Normal appearance.   HENT:      Head: Normocephalic.   Cardiovascular:      Rate and Rhythm: Normal rate and regular rhythm.      Pulses: Normal pulses.      Heart sounds: Normal heart sounds.   Pulmonary:      Effort: Pulmonary effort is normal.      Breath sounds: Normal breath sounds.   Musculoskeletal:         General: No swelling or tenderness.   Skin:     General: Skin is warm.      Comments: Vertical scar in mid sternal region, no drainage, no inflammatory signs    Neurological:      Mental Status: He is alert and oriented to person, place, and time.   Psychiatric:         Mood and Affect: Mood normal.         Behavior: Behavior normal.           Assessment and Plan:     1. Hospital discharge follow-up  - Chart and discharge summary were reviewed.   - Hospitalization and results reviewed with patient.   - Medications reviewed including instructions regarding high risk medications, dosing and side effects.  - Recommended Services: No services needed at this time  - Advance directive/POLST on file?  No   -Recommended to follow-up instructions.  Recommended to follow-up with cardiology and cardiothoracic surgery.  Recommended cardiac rehab follow-up.    2. Atherosclerosis of native coronary artery of native heart with angina pectoris (HCC)  3. Hx of CABG  Patient with multivessel coronary artery disease on angiogram, now status post CABG 3V.  Stable  Plan  Continue DAPT with aspirin 81 mg and Plavix 75 mg daily  Continue Lipitor 40 mg " daily  Continue Coreg 3.125 mg twice daily  Continue Lasix 20 mg daily along with potassium supplement.    Follow-up with cardiologist    4. History of peptic ulcer  Patient with history of peptic ulcer disease.  Currently stable  Given he is on multiple medications including DAPT, recommended to continue Prilosec 20 mg once a day in the morning.  Avoid NSAIDs like ibuprofen Motrin or Advil.  Avoid any medications which can increase bleeding risk.  Recommended to avoid any herbal products or over-the-counter medications without discussing with physician    5. Atrial fibrillation, unspecified type (HCC)  This is a new problem.  Patient developed new A-fib with RVR after his coronary artery bypass surgery.  Today in sinus rhythm  Plan  Continue amiodarone as instructed  Continue aspirin and Plavix daily  Continue Coreg 3.125 mg twice daily  Continue following up with cardiology        Follow-up:Return in about 6 months (around 7/29/2024), or if symptoms worsen or fail to improve.    Face-to-face transitional care management services with HIGH (today's visit is within days post discharge & LACE+ score 59+) medical decision complexity were provided.

## 2024-02-07 NOTE — PROGRESS NOTES
"CHIEF COMPLAINT: Post-op visit     PROCEDURE: Dr. Bacon 1/18/2024  CORONARY ARTERY BYPASS GRAFT X3 WITH SKELETONIZED REYES TO LAD, RSVG TO OM1, RSVG TO PDA  ENDARTERECTOMY- RIGHT CORONARY ARTERY - Wound Class: Clean with Drain  ENDOSCOPIC VEIN HARVEST OF LEFT GREATER SAPHENOUS VEIN- Wound Class: Clean with Drain  ECHOCARDIOGRAM, TRANSESOPHAGEAL, INTRAOPERATIVE    HPI: Doing well. Walking 2-3 miles a day. Concerned about IS volumes regressing.     /66 (BP Location: Left arm, Patient Position: Sitting, BP Cuff Size: Adult)   Pulse 78   Temp 36.9 °C (98.5 °F) (Temporal)   Ht 1.778 m (5' 10\")   Wt 91.8 kg (202 lb 6.4 oz)   SpO2 95%     PHYSICAL EXAM:  Physical Exam  HENT:      Head: Normocephalic.   Cardiovascular:      Rate and Rhythm: Normal rate and regular rhythm.   Pulmonary:      Effort: Pulmonary effort is normal. No respiratory distress.      Breath sounds: Normal breath sounds.   Abdominal:      Palpations: Abdomen is soft.   Skin:     General: Skin is warm and dry.      Comments: Sternum Stable  Sternal Incision- C/D/I  SVG site- c/d/i   Neurological:      Mental Status: He is alert and oriented to person, place, and time.   Psychiatric:         Mood and Affect: Mood and affect normal.         Cognition and Memory: Memory normal.         Judgment: Judgment normal.        PLAN:   Ck CXR to r/o effusion causing decreased lung volumes    Plans to start cardiac rehab on 3/4     Continue plavix for 3 months or per your Cardiologist's recommendations.  We reviewed post operative sternal precautions, weight limits and driving precautions moving forward.  We reviewed SBE prophylaxis.      Overall, we are very pleased with the patient’s progress and we have instructed the patient to follow-up with us in the future should they have any concerns related to their surgery. Otherwise, we will see the patient on a PRN basis. The patient will continue to follow-up with their Cardiologist and PCP.  The patient " has been informed that any further prescription refills should be done through their primary care physician and/or cardiologist.  They acknowledged understanding.  Thank you for allowing us to participate in the care of this very pleasant patient and please let us know if there is any way we may be of further assistance.

## 2024-02-14 DIAGNOSIS — Z95.1 S/P CABG X 3: ICD-10-CM

## 2024-02-14 RX ORDER — CARVEDILOL 3.12 MG/1
3.12 TABLET ORAL 2 TIMES DAILY WITH MEALS
Qty: 180 TABLET | Refills: 1 | Status: SHIPPED | OUTPATIENT
Start: 2024-02-14 | End: 2024-02-15 | Stop reason: SDUPTHER

## 2024-02-15 ENCOUNTER — APPOINTMENT (OUTPATIENT)
Dept: MEDICAL GROUP | Facility: MEDICAL CENTER | Age: 69
End: 2024-02-15
Payer: MEDICARE

## 2024-02-15 ENCOUNTER — OFFICE VISIT (OUTPATIENT)
Dept: CARDIOLOGY | Facility: MEDICAL CENTER | Age: 69
End: 2024-02-15
Attending: PHYSICIAN ASSISTANT
Payer: MEDICARE

## 2024-02-15 VITALS
OXYGEN SATURATION: 96 % | HEIGHT: 70 IN | HEART RATE: 84 BPM | BODY MASS INDEX: 29.29 KG/M2 | WEIGHT: 204.6 LBS | SYSTOLIC BLOOD PRESSURE: 130 MMHG | DIASTOLIC BLOOD PRESSURE: 76 MMHG

## 2024-02-15 DIAGNOSIS — Z95.1 HX OF CABG: ICD-10-CM

## 2024-02-15 DIAGNOSIS — I48.91 ATRIAL FIBRILLATION, UNSPECIFIED TYPE (HCC): ICD-10-CM

## 2024-02-15 DIAGNOSIS — E78.5 DYSLIPIDEMIA: ICD-10-CM

## 2024-02-15 DIAGNOSIS — I10 ESSENTIAL HYPERTENSION, BENIGN: ICD-10-CM

## 2024-02-15 DIAGNOSIS — Z95.1 S/P CABG X 3: ICD-10-CM

## 2024-02-15 PROCEDURE — 3078F DIAST BP <80 MM HG: CPT | Performed by: PHYSICIAN ASSISTANT

## 2024-02-15 PROCEDURE — 99212 OFFICE O/P EST SF 10 MIN: CPT | Performed by: PHYSICIAN ASSISTANT

## 2024-02-15 PROCEDURE — 3075F SYST BP GE 130 - 139MM HG: CPT | Performed by: PHYSICIAN ASSISTANT

## 2024-02-15 PROCEDURE — 99214 OFFICE O/P EST MOD 30 MIN: CPT | Performed by: PHYSICIAN ASSISTANT

## 2024-02-15 RX ORDER — AMIODARONE HYDROCHLORIDE 200 MG/1
200 TABLET ORAL DAILY
Qty: 60 TABLET | Refills: 0 | Status: SHIPPED | OUTPATIENT
Start: 2024-02-15

## 2024-02-15 RX ORDER — ATORVASTATIN CALCIUM 40 MG/1
40 TABLET, FILM COATED ORAL
Qty: 100 TABLET | Refills: 3 | Status: SHIPPED | OUTPATIENT
Start: 2024-02-15

## 2024-02-15 RX ORDER — CARVEDILOL 3.12 MG/1
3.12 TABLET ORAL 2 TIMES DAILY WITH MEALS
Qty: 200 TABLET | Refills: 3 | Status: SHIPPED | OUTPATIENT
Start: 2024-02-15

## 2024-02-15 RX ORDER — CLOPIDOGREL BISULFATE 75 MG/1
75 TABLET ORAL DAILY
Qty: 60 TABLET | Refills: 0 | Status: SHIPPED | OUTPATIENT
Start: 2024-02-15

## 2024-02-15 ASSESSMENT — FIBROSIS 4 INDEX: FIB4 SCORE: 1.25

## 2024-02-15 NOTE — PROGRESS NOTES
Chief Complaint   Patient presents with    Chest Pain       Subjective     Joe Morelos is a 68 y.o. male with a history of dyslipidemia, hypertension, prediabetes and obesity who presents today for hospital follow up.    His primary cardiologist is Dr. Eldridge. Last seen 11/1/2023. At that exam, he was seen as a consultation for abnormal stress test.  He was feeling well overall, but did have some chest pressure on exertion.  A reversible defect in the inferior wall was found on stress testing.  He was recommended for coronary angiogram and started on aspirin 81 mg daily.  He was also recommended to have an echocardiogram performed.  He underwent coronary angiogram performed on 12/21/2023 which revealed severe 3 vessel CAD. He was evaluated by Dr. Bacon who recommended 3 vessel CABG which was performed on 1/18/2024.     Today, he reports he is doing very well and is without cardiac symptoms. He does have an occasional pulling sensation at his sternal incision. No chest pain or palpitations. No shortness of breath, dyspnea on exertion, orthopnea or PND. No lower extremity edema. No dizziness or lightheadedness. No syncope or presyncope.      He is walking 3 miles per day in two-three intervals.    He is very motivated to get off of medications.     Past Medical History:   Diagnosis Date    BMI 27.0-27.9,adult 07/28/2020    Heart burn     High cholesterol     Hypertension      Past Surgical History:   Procedure Laterality Date    MULTIPLE CORONARY ARTERY BYPASS ENDO VEIN HARVEST  1/18/2024    Procedure: CORONARY ARTERY BYPASS GRAFT X3, ENDOSCOPIC VEIN HARVEST;  Surgeon: Arnaldo Bacon D.O.;  Location: SURGERY [facility];  Service: Cardiothoracic    ECHOCARDIOGRAM, TRANSESOPHAGEAL, INTRAOPERATIVE  1/18/2024    Procedure: ECHOCARDIOGRAM, TRANSESOPHAGEAL, INTRAOPERATIVE;  Surgeon: Arnaldo Bacon D.O.;  Location: SURGERY [facility];  Service: Cardiothoracic    ENDARTERECTOMY  1/18/2024     Procedure: ENDARTERECTOMY- RIGHT CORONARY ARTERY;  Surgeon: Arnaldo Bacon D.O.;  Location: SURGERY [facility];  Service: Cardiothoracic    PB RECONSTRUC TOE DEFORM,SOFT TISSUE Left 12/30/2021    Procedure: LEFT FOOT HALLUX VALGUS CORRECTION, LEFT AKIN OSTEOTOMY,;  Surgeon: Ollie Kovacs M.D.;  Location: [facility];  Service: Orthopedics    HI COR HLX VLGS PRX PHLX OSTEOT Left 12/30/2021    Procedure: LEFT DISTAL SOFT TISSUE PROCEDURE;  Surgeon: Ollie Kovacs M.D.;  Location: [facility];  Service: Orthopedics    PB OSTEOTOMY METATARSALS,MULTIPLE Left 12/30/2021    Procedure: LEFT SECOND, THIRD DISTAL METATARSAL OSTEOTOMY;  Surgeon: Ollie Kovacs M.D.;  Location: [facility];  Service: Orthopedics    PB PART EXCIS 5TH METATARSAL HEAD Left 12/30/2021    Procedure: LEFT SECOND, THIRD METATARSOPHALANGEAL JOINT RECONSTRUCTION;  Surgeon: Ollie Kovacs M.D.;  Location: [facility];  Service: Orthopedics    PB REPAIR OF HAMMERTOE,ONE Left 12/30/2021    Procedure: LEFT SECOND AND THIRD HAMMERTOE CORRECTION;  Surgeon: Ollie Kovacs M.D.;  Location: [facility];  Service: Orthopedics    PB EXCIS INTERDIGITAL NEUROMA,EA Left 12/30/2021    Procedure: LEFT SECOND WEBSPACE NEUROMA EXCISION;  Surgeon: Ollie Kovacs M.D.;  Location: [facility];  Service: Orthopedics     Family History   Problem Relation Age of Onset    Heart Disease Mother         CHF    Heart Disease Father         bypass age 49, 75     Social History     Socioeconomic History    Marital status: Single     Spouse name: Not on file    Number of children: Not on file    Years of education: Not on file    Highest education level: Not on file   Occupational History    Not on file   Tobacco Use    Smoking status: Never    Smokeless tobacco: Never   Vaping Use    Vaping Use: Never used   Substance and Sexual Activity     Alcohol use: Yes     Alcohol/week: 0.6 oz     Types: 1 Shots of liquor per week     Comment: once a month    Drug use: Not Currently    Sexual activity: Not on file   Other Topics Concern    Not on file   Social History Narrative    Not on file     Social Determinants of Health     Financial Resource Strain: Not on file   Food Insecurity: Not on file   Transportation Needs: Not on file   Physical Activity: Not on file   Stress: Not on file   Social Connections: Not on file   Intimate Partner Violence: Not on file   Housing Stability: Not on file     No Known Allergies  Outpatient Encounter Medications as of 2/15/2024   Medication Sig Dispense Refill    amiodarone (CORDARONE) 200 MG Tab Take 1 Tablet by mouth every day. 60 Tablet 0    atorvastatin (LIPITOR) 40 MG Tab Take 1 Tablet by mouth at bedtime. 100 Tablet 3    carvedilol (COREG) 3.125 MG Tab Take 1 Tablet by mouth 2 times a day with meals. 200 Tablet 3    clopidogrel (PLAVIX) 75 MG Tab Take 1 Tablet by mouth every day. 60 Tablet 0    acetaminophen (TYLENOL) 500 MG Tab Take 2 Tablets by mouth every 6 hours as needed for Mild Pain. 30 Tablet 0    omeprazole (PRILOSEC) 20 MG delayed-release capsule Take 1 Capsule by mouth every day. 30 Capsule 2    aspirin 81 MG EC tablet Take 1 Tablet by mouth every day. 100 Tablet 2    [DISCONTINUED] carvedilol (COREG) 3.125 MG Tab TAKE 1 TABLET BY MOUTH TWICE A DAY WITH FOOD 180 Tablet 1    [DISCONTINUED] atorvastatin (LIPITOR) 40 MG Tab Take 1 Tablet by mouth at bedtime. 30 Tablet 2    [DISCONTINUED] furosemide (LASIX) 20 MG Tab Take 1 Tablet by mouth every day. 30 Tablet 1    [DISCONTINUED] potassium chloride SA (K-DUR) 10 MEQ Tab CR Take 1 Tablet by mouth every day. 30 Tablet 1    [DISCONTINUED] amiodarone (CORDARONE) 200 MG Tab Take 2 Tablets by mouth 2 times a day for 7 days, THEN 2 Tablets every day for 7 days, THEN 1 Tablet every day thereafter. (Patient taking differently: 1 tablet in the morning) 56 Tablet 0     "[DISCONTINUED] clopidogrel (PLAVIX) 75 MG Tab Take 1 Tablet by mouth every day. 30 Tablet 2     No facility-administered encounter medications on file as of 2/15/2024.     Review of Systems   Constitutional: Negative.  Negative for chills, fever and malaise/fatigue.   HENT: Negative.     Eyes: Negative.  Negative for blurred vision and double vision.   Respiratory:  Negative for cough and shortness of breath.    Cardiovascular:  Negative for chest pain, palpitations, orthopnea, claudication, leg swelling and PND.   Gastrointestinal: Negative.  Negative for abdominal pain, nausea and vomiting.   Genitourinary: Negative.    Musculoskeletal: Negative.  Negative for myalgias.   Skin: Negative.  Negative for rash.   Neurological: Negative.  Negative for dizziness, loss of consciousness, weakness and headaches.   Endo/Heme/Allergies: Negative.  Does not bruise/bleed easily.   Psychiatric/Behavioral: Negative.                Objective     /76 (BP Location: Left arm, Patient Position: Sitting, BP Cuff Size: Adult)   Pulse 84   Ht 1.778 m (5' 10\")   Wt 92.8 kg (204 lb 9.6 oz)   SpO2 96%   BMI 29.36 kg/m²     Physical Exam  Vitals reviewed.   Constitutional:       General: He is not in acute distress.     Appearance: Normal appearance.   HENT:      Head: Normocephalic and atraumatic.      Right Ear: External ear normal.      Left Ear: External ear normal.   Eyes:      General: No scleral icterus.     Extraocular Movements: Extraocular movements intact.      Conjunctiva/sclera: Conjunctivae normal.      Pupils: Pupils are equal, round, and reactive to light.   Cardiovascular:      Rate and Rhythm: Normal rate and regular rhythm.      Pulses: Normal pulses.      Heart sounds: Normal heart sounds. No murmur heard.     No friction rub. No gallop.      Comments: Well healed sternal incision. Crusting at the port sites without signs of infection.   Pulmonary:      Effort: Pulmonary effort is normal.      Breath sounds: " Normal breath sounds.   Abdominal:      General: Bowel sounds are normal.      Palpations: Abdomen is soft.      Tenderness: There is no abdominal tenderness.   Musculoskeletal:         General: Normal range of motion.      Cervical back: Normal range of motion and neck supple.      Right lower leg: No edema.      Left lower leg: No edema.   Skin:     General: Skin is warm and dry.      Capillary Refill: Capillary refill takes less than 2 seconds.   Neurological:      General: No focal deficit present.      Mental Status: He is alert and oriented to person, place, and time.   Psychiatric:         Mood and Affect: Mood normal.         Behavior: Behavior normal.         Judgment: Judgment normal.        Latest Reference Range & Units 02/17/23 06:52   Cholesterol,Tot 100 - 199 mg/dL 194   Triglycerides 0 - 149 mg/dL 178 (H)   HDL >=40 mg/dL 45   LDL <100 mg/dL 113 (H)   (H): Data is abnormally high     Latest Reference Range & Units 01/23/24 00:29   Sodium 135 - 145 mmol/L 138   Potassium 3.6 - 5.5 mmol/L 3.6   Chloride 96 - 112 mmol/L 97   Co2 20 - 33 mmol/L 28   Anion Gap 7.0 - 16.0  13.0   Glucose 65 - 99 mg/dL 117 (H)   Bun 8 - 22 mg/dL 11   Creatinine 0.50 - 1.40 mg/dL 0.64   GFR (CKD-EPI) >60 mL/min/1.73 m 2 103   Calcium 8.5 - 10.5 mg/dL 8.5   (H): Data is abnormally high    Cardiovascular imaging and procedures:    NM stress test 10/19/2023  NUCLEAR IMAGING INTERPRETATION   Questionable moderate sized reversible perfusion defect of the inferior wall.      SDS would be 3.   No focal wall motion abnormality seen.   LVEF 57%.   ECG INTERPRETATION   ST depression 1mm horizontal lateral leads at peak stress.    Echocardiogram 11/1/2023  CONCLUSIONS  Normal left ventricular systolic function. The ejection fraction is   measured to be 61 % by Reed's biplane.  Normal right ventricular size and systolic function.  Aortic sclerosis without stenosis. Mild aortic insufficiency.  Unable to estimate right ventricular  systolic pressure due to an   inadequate tricuspid regurgitant jet.  Mild dilatation of ascending aorta 4.3cm.  No prior study is available for comparison.    [facility] 12/21/2023  HEMODYNAMICS:   Aortic pressure: 120 /70 mmHg  LVEDP: 18 mmHg  No significant aortic gradient on pullback  CORONARY ANGIOGRAPHY:  The left main coronary artery : Large-caliber vessel with mild luminal irregularities, bifurcates to LAD and left circumflex  The left anterior descending coronary artery : Large-caliber transapical vessel with calcified proximal/midportion with diffuse disease throughout the proximal/mid LAD at 70%, iFR 0.75 suggestive of hemodynamically significant stenosis.  Left-to-right collaterals  The left circumflex coronary artery : Large-caliber vessel that gives rise to medium OM1 with diffuse nonobstructive CAD, large OM 2 that has severe 80% proximal stenosis before its bifurcation.  Left-to-right collaterals.  The right coronary artery  : Large-caliber dominant vessel with  of its midportion fills via left-to-right collaterals  INSTANTANEOUS WAVE FREE RATIO:  IFR of the proximal/mid left anterior descending coronary artery was 0.75: suggestive of a hemodynamically significant stenosis.  IMPRESSION:  Severe three-vessel CAD (mid RCA , mid cx-OM and prox/mid LAD IFR 0.75)  Mildly elevated resting LVEDP 18 mmHg with no significant transaortic gradient on pullback  RECOMMENDATIONS:  CT surgery have been consulted, they will arrange for outpatient consultation to discuss CABG as a revascularization option for this patient  Ongoing optimal secondary ASCVD prevention  TR band protocol         Assessment & Plan     1. Dyslipidemia  Lipid Profile      2. Hx of CABG  Lipid Profile      3. S/P CABG x 3  atorvastatin (LIPITOR) 40 MG Tab    carvedilol (COREG) 3.125 MG Tab    clopidogrel (PLAVIX) 75 MG Tab      4. Essential hypertension, benign        5. Atrial fibrillation, unspecified type (HCC)  amiodarone (CORDARONE) 200 MG  Tab          Medical Decision Making: Today's Assessment/Status/Plan:        CAD s/p CABG x3 (LIMA to LAD, RSVG to OM1, RSVG to PDA) 1/18/2024  - Asymptomatic  - Continue DAPT with plavix and aspirin for the next 3 months and then aspirin for life.  - LVEF 61%.   - Continue carvedilol 3.125mg BID. -Discussed the indication for ACE/ARB, but he is very motivated to get off of medications. In order to increase compliance we will hold off on ACE/ARB at this time.   - Discussed cardiac rehab. He is not interested.  - Continue atorvastatin.  - He is euvolemic on exam. Continue lasix with potassium until the rx runs out and then stop.  - He has follow up with CT surgery next week.    Hypertension  - Blood pressure exhibits borderline control  - Encouraged at home BP monitoring.  - Continue carvedilol 3.125mg BID.     Post op afib  - Asymptomatic  - Regular rate and rhythm on exam today.  - Continue amiodarone 200mg daily for the next 3 months.  - Anticoagulation not currently indicated given no prior history and elicited post op after CABG.  - Continue coreg.    Dyslipidemia  -; goal <70  - Repeat lipid panel  - Continue atorvastatin 40mg daily. If LDL not at goal on repeat, will increase atorvastatin to 80mg daily.   - Continue diet and lifestyle modifications.     Follow up in 3 months or sooner with clinical changes.    Encouraged him to reach out via MyChart or telephone with any questions or concerns.    Thank you for allowing me to participate in the care of Joe Contreras Jethro .    Jessica Angel PA-C, Cardiology  [facility], [facility]  [address] [address]  Phone: [phone]  Fax: [phone]    PLEASE NOTE: This note was created using voice recognition software. I have made every reasonable attempt to correct obvious errors, but I expect that there are errors of grammar and possibly content that I did not discover  before finalizing the note.

## 2024-02-16 ENCOUNTER — HOSPITAL ENCOUNTER (OUTPATIENT)
Dept: LAB | Facility: MEDICAL CENTER | Age: 69
End: 2024-02-16
Attending: PHYSICIAN ASSISTANT
Payer: MEDICARE

## 2024-02-16 DIAGNOSIS — E78.5 DYSLIPIDEMIA: ICD-10-CM

## 2024-02-16 DIAGNOSIS — Z95.1 HX OF CABG: ICD-10-CM

## 2024-02-16 LAB
CHOLEST SERPL-MCNC: 136 MG/DL (ref 100–199)
FASTING STATUS PATIENT QL REPORTED: NORMAL
HDLC SERPL-MCNC: 35 MG/DL
LDLC SERPL CALC-MCNC: 83 MG/DL
TRIGL SERPL-MCNC: 91 MG/DL (ref 0–149)

## 2024-02-16 PROCEDURE — 36415 COLL VENOUS BLD VENIPUNCTURE: CPT

## 2024-02-16 PROCEDURE — 80061 LIPID PANEL: CPT

## 2024-02-20 ENCOUNTER — HOSPITAL ENCOUNTER (OUTPATIENT)
Dept: RADIOLOGY | Facility: MEDICAL CENTER | Age: 69
End: 2024-02-20
Attending: NURSE PRACTITIONER
Payer: MEDICARE

## 2024-02-20 ENCOUNTER — OFFICE VISIT (OUTPATIENT)
Dept: CARDIOTHORACIC SURGERY | Facility: MEDICAL CENTER | Age: 69
End: 2024-02-20
Payer: MEDICARE

## 2024-02-20 VITALS
DIASTOLIC BLOOD PRESSURE: 66 MMHG | TEMPERATURE: 98.5 F | HEART RATE: 78 BPM | WEIGHT: 202.4 LBS | OXYGEN SATURATION: 95 % | HEIGHT: 70 IN | SYSTOLIC BLOOD PRESSURE: 124 MMHG | BODY MASS INDEX: 28.98 KG/M2

## 2024-02-20 DIAGNOSIS — Z09 POSTOP CHECK: ICD-10-CM

## 2024-02-20 PROCEDURE — 71046 X-RAY EXAM CHEST 2 VIEWS: CPT

## 2024-02-20 PROCEDURE — 99024 POSTOP FOLLOW-UP VISIT: CPT | Performed by: NURSE PRACTITIONER

## 2024-02-20 PROCEDURE — 3078F DIAST BP <80 MM HG: CPT | Performed by: NURSE PRACTITIONER

## 2024-02-20 PROCEDURE — 3074F SYST BP LT 130 MM HG: CPT | Performed by: NURSE PRACTITIONER

## 2024-02-20 ASSESSMENT — ENCOUNTER SYMPTOMS
ORTHOPNEA: 0
CHILLS: 0
DIZZINESS: 0
NEUROLOGICAL NEGATIVE: 1
VOMITING: 0
LOSS OF CONSCIOUSNESS: 0
PND: 0
GASTROINTESTINAL NEGATIVE: 1
ABDOMINAL PAIN: 0
NAUSEA: 0
DOUBLE VISION: 0
BLURRED VISION: 0
MYALGIAS: 0
SHORTNESS OF BREATH: 0
MUSCULOSKELETAL NEGATIVE: 1
PSYCHIATRIC NEGATIVE: 1
HEADACHES: 0
EYES NEGATIVE: 1
CONSTITUTIONAL NEGATIVE: 1
FEVER: 0
CLAUDICATION: 0
BRUISES/BLEEDS EASILY: 0
WEAKNESS: 0
PALPITATIONS: 0
COUGH: 0

## 2024-02-20 ASSESSMENT — FIBROSIS 4 INDEX: FIB4 SCORE: 1.25

## 2024-02-21 ENCOUNTER — TELEPHONE (OUTPATIENT)
Dept: CARDIOTHORACIC SURGERY | Facility: MEDICAL CENTER | Age: 69
End: 2024-02-21
Payer: MEDICARE

## 2024-02-21 DIAGNOSIS — J90 PLEURAL EFFUSION: ICD-10-CM

## 2024-02-21 NOTE — TELEPHONE ENCOUNTER
Per DENIS Chacon, thoracentesis ordered, please call patient to inform him of the CXR results and the number to schedule it, I left VM with the pertinent information.

## 2024-02-22 ENCOUNTER — HOSPITAL ENCOUNTER (OUTPATIENT)
Dept: RADIOLOGY | Facility: MEDICAL CENTER | Age: 69
End: 2024-02-22
Attending: NURSE PRACTITIONER
Payer: MEDICARE

## 2024-02-22 VITALS — HEART RATE: 74 BPM | OXYGEN SATURATION: 98 % | SYSTOLIC BLOOD PRESSURE: 137 MMHG | DIASTOLIC BLOOD PRESSURE: 80 MMHG

## 2024-02-22 DIAGNOSIS — J90 PLEURAL EFFUSION: ICD-10-CM

## 2024-02-22 PROCEDURE — C1729 CATH, DRAINAGE: HCPCS

## 2024-02-22 PROCEDURE — 71045 X-RAY EXAM CHEST 1 VIEW: CPT

## 2024-03-01 ENCOUNTER — APPOINTMENT (OUTPATIENT)
Dept: MEDICAL GROUP | Facility: MEDICAL CENTER | Age: 69
End: 2024-03-01
Payer: MEDICARE

## 2024-03-04 ENCOUNTER — TELEPHONE (OUTPATIENT)
Dept: HEALTH INFORMATION MANAGEMENT | Facility: OTHER | Age: 69
End: 2024-03-04

## 2024-03-04 ENCOUNTER — NON-PROVIDER VISIT (OUTPATIENT)
Dept: CARDIOLOGY | Facility: MEDICAL CENTER | Age: 69
End: 2024-03-04
Payer: MEDICARE

## 2024-03-04 DIAGNOSIS — Z95.1 POSTSURGICAL AORTOCORONARY BYPASS STATUS: Primary | ICD-10-CM

## 2024-03-04 PROCEDURE — G0423 INTENS CARDIAC REHAB NO EXER: HCPCS | Mod: 59 | Performed by: INTERNAL MEDICINE

## 2024-03-04 PROCEDURE — G0422 INTENS CARDIAC REHAB W/EXERC: HCPCS | Performed by: INTERNAL MEDICINE

## 2024-03-04 NOTE — PROGRESS NOTES
Patient arrived for initial 1:1 Intensive Cardiac Rehabilitation Consultation and Education session with the Registered Nurse.  A total of 60 minutes was spent with the patient during which time a focused cardiovascular assessment was completed and musculoskeletal limitations were addressed in preparation to safely start the exercise portion of the program.  Education regarding the program was explained including exercise goals, precautions, and target heart rate during exercise.  Nutrition goals were reviewed and patient was introduced to the [facility] Program.  Stress management goals were discussed and patient concerns and questions were answered at this time. Patient arrived for education at 0800 and visit was concluded at 0900.  Exercise time was from 0900 to 1000.

## 2024-03-06 ENCOUNTER — NON-PROVIDER VISIT (OUTPATIENT)
Dept: CARDIOLOGY | Facility: MEDICAL CENTER | Age: 69
End: 2024-03-06
Payer: MEDICARE

## 2024-03-06 DIAGNOSIS — Z95.1 POSTSURGICAL AORTOCORONARY BYPASS STATUS: ICD-10-CM

## 2024-03-06 PROCEDURE — G0422 INTENS CARDIAC REHAB W/EXERC: HCPCS | Performed by: INTERNAL MEDICINE

## 2024-03-06 PROCEDURE — G0423 INTENS CARDIAC REHAB NO EXER: HCPCS | Mod: 59 | Performed by: INTERNAL MEDICINE

## 2024-03-06 NOTE — PROGRESS NOTES
Joe Contreras [place] attended Intensive Cardiac Rehab today from 1300 to 1500. During his time he exercised and attended class.   His education today was a cooking school titled: Comforting Breakfasts. Patient received handouts and class discussion pertaining to the topic.

## 2024-03-07 ENCOUNTER — NON-PROVIDER VISIT (OUTPATIENT)
Dept: CARDIOLOGY | Facility: MEDICAL CENTER | Age: 69
End: 2024-03-07
Payer: MEDICARE

## 2024-03-07 DIAGNOSIS — Z95.1 POSTSURGICAL AORTOCORONARY BYPASS STATUS: ICD-10-CM

## 2024-03-07 PROCEDURE — G0423 INTENS CARDIAC REHAB NO EXER: HCPCS | Mod: 59 | Performed by: INTERNAL MEDICINE

## 2024-03-07 PROCEDURE — G0422 INTENS CARDIAC REHAB W/EXERC: HCPCS | Performed by: INTERNAL MEDICINE

## 2024-03-08 NOTE — PROGRESS NOTES
Joe Morelos attended Intensive Cardiac Rehab today from 1300 to 1500. During his time he exercised and attended class.   His education today  was a WORKSHOP titled: MENUS AND DINING OUT. Patient received handouts and class discussion pertaining to the topic.

## 2024-03-12 ENCOUNTER — NON-PROVIDER VISIT (OUTPATIENT)
Dept: CARDIOLOGY | Facility: MEDICAL CENTER | Age: 69
End: 2024-03-12
Payer: MEDICARE

## 2024-03-12 DIAGNOSIS — Z95.1 POSTSURGICAL AORTOCORONARY BYPASS STATUS: ICD-10-CM

## 2024-03-12 PROCEDURE — G0422 INTENS CARDIAC REHAB W/EXERC: HCPCS | Performed by: INTERNAL MEDICINE

## 2024-03-12 PROCEDURE — G0423 INTENS CARDIAC REHAB NO EXER: HCPCS | Mod: 59 | Performed by: INTERNAL MEDICINE

## 2024-03-12 NOTE — PROGRESS NOTES
Joe Contreras [place] attended Intensive Cardiac Rehab today from 1300 to 1500. During his time he exercised and attended class.   His education today was a video titled: Healthy Minds, Bodies and Hearts. Patient received handouts and class discussion pertaining to the topic.

## 2024-04-07 DIAGNOSIS — I48.91 ATRIAL FIBRILLATION, UNSPECIFIED TYPE (HCC): ICD-10-CM

## 2024-04-07 DIAGNOSIS — Z95.1 S/P CABG X 3: ICD-10-CM

## 2024-04-08 RX ORDER — AMIODARONE HYDROCHLORIDE 200 MG/1
200 TABLET ORAL DAILY
Qty: 90 TABLET | Refills: 1 | Status: SHIPPED | OUTPATIENT
Start: 2024-04-08 | End: 2024-04-12

## 2024-04-08 RX ORDER — CLOPIDOGREL BISULFATE 75 MG/1
75 TABLET ORAL DAILY
Qty: 90 TABLET | Refills: 2 | Status: SHIPPED | OUTPATIENT
Start: 2024-04-08 | End: 2024-04-12

## 2024-04-12 ENCOUNTER — HOSPITAL ENCOUNTER (OUTPATIENT)
Dept: RADIOLOGY | Facility: MEDICAL CENTER | Age: 69
End: 2024-04-12
Attending: PHYSICIAN ASSISTANT
Payer: MEDICARE

## 2024-04-12 ENCOUNTER — OFFICE VISIT (OUTPATIENT)
Dept: CARDIOLOGY | Facility: MEDICAL CENTER | Age: 69
End: 2024-04-12
Attending: PHYSICIAN ASSISTANT
Payer: MEDICARE

## 2024-04-12 VITALS
RESPIRATION RATE: 16 BRPM | SYSTOLIC BLOOD PRESSURE: 100 MMHG | HEART RATE: 59 BPM | BODY MASS INDEX: 28.6 KG/M2 | OXYGEN SATURATION: 95 % | DIASTOLIC BLOOD PRESSURE: 64 MMHG | WEIGHT: 199.8 LBS | HEIGHT: 70 IN

## 2024-04-12 DIAGNOSIS — Z95.1 S/P CABG X 3: ICD-10-CM

## 2024-04-12 DIAGNOSIS — I48.91 ATRIAL FIBRILLATION, UNSPECIFIED TYPE (HCC): ICD-10-CM

## 2024-04-12 DIAGNOSIS — I10 ESSENTIAL HYPERTENSION, BENIGN: ICD-10-CM

## 2024-04-12 DIAGNOSIS — R07.89 CHEST TIGHTNESS: ICD-10-CM

## 2024-04-12 DIAGNOSIS — Z95.1 POSTSURGICAL AORTOCORONARY BYPASS STATUS: ICD-10-CM

## 2024-04-12 DIAGNOSIS — E78.5 DYSLIPIDEMIA: ICD-10-CM

## 2024-04-12 PROCEDURE — 3078F DIAST BP <80 MM HG: CPT | Performed by: PHYSICIAN ASSISTANT

## 2024-04-12 PROCEDURE — 3074F SYST BP LT 130 MM HG: CPT | Performed by: PHYSICIAN ASSISTANT

## 2024-04-12 PROCEDURE — 99214 OFFICE O/P EST MOD 30 MIN: CPT | Performed by: PHYSICIAN ASSISTANT

## 2024-04-12 PROCEDURE — 71046 X-RAY EXAM CHEST 2 VIEWS: CPT

## 2024-04-12 ASSESSMENT — FIBROSIS 4 INDEX: FIB4 SCORE: 1.25

## 2024-04-12 NOTE — PROGRESS NOTES
Chief Complaint   Patient presents with    Dyslipidemia       Subjective     Johnny Morelos is a 68 y.o. male Joe Morelos is a 68 y.o. male with a history of dyslipidemia, hypertension, prediabetes and obesity who presents today for follow up.       His primary cardiologist is Dr. Eldridge. Last seen 2/15/2024   18.6 miles a min    Past Medical History:   Diagnosis Date    BMI 27.0-27.9,adult 07/28/2020    Heart burn     High cholesterol     Hypertension      Past Surgical History:   Procedure Laterality Date    MULTIPLE CORONARY ARTERY BYPASS ENDO VEIN HARVEST  1/18/2024    Procedure: CORONARY ARTERY BYPASS GRAFT X3, ENDOSCOPIC VEIN HARVEST;  Surgeon: Arnaldo Bacon D.O.;  Location: SUR[facility]ERY [facility];  Service: Cardiothoracic    ECHOCARDIOGRAM, TRANSESOPHAGEAL, INTRAOPERATIVE  1/18/2024    Procedure: ECHOCARDIOGRAM, TRANSESOPHAGEAL, INTRAOPERATIVE;  Surgeon: Arnaldo Bacon D.O.;  Location: [facility];  Service: Cardiothoracic    ENDARTERECTOMY  1/18/2024    Procedure: ENDARTERECTOMY- RIGHT CORONARY ARTERY;  Surgeon: Arnaldo Bacon D.O.;  Location: SURGERY [facility];  Service: Cardiothoracic    PB RECONSTRUC TOE DEFORM,SOFT TISSUE Left 12/30/2021    Procedure: LEFT FOOT HALLUX VALGUS CORRECTION, LEFT AKIN OSTEOTOMY,;  Surgeon: Ollie Kovacs M.D.;  Location: [facility];  Service: Orthopedics    MD COR HLX VLGS PRX PHLX OSTEOT Left 12/30/2021    Procedure: LEFT DISTAL SOFT TISSUE PROCEDURE;  Surgeon: Ollie Kovacs M.D.;  Location: [facility];  Service: Orthopedics    PB OSTEOTOMY METATARSALS,MULTIPLE Left 12/30/2021    Procedure: LEFT SECOND, THIRD DISTAL METATARSAL OSTEOTOMY;  Surgeon: Ollie Kovacs M.D.;  Location: [facility];  Service: Orthopedics    PB PART EXCIS 5TH METATARSAL HEAD Left 12/30/2021    Procedure: LEFT SECOND, THIRD METATARSOPHALANGEAL JOINT RECONSTRUCTION;  Surgeon: Ollie Kovacs,  M.D.;  Location: [facility];  Service: Orthopedics    PB REPAIR OF HAMMERTOE,ONE Left 12/30/2021    Procedure: LEFT SECOND AND THIRD HAMMERTOE CORRECTION;  Surgeon: Ollie Kovacs M.D.;  Location: [facility];  Service: Orthopedics    PB EXCIS INTERDIGITAL NEUROMA,EA Left 12/30/2021    Procedure: LEFT SECOND WEBSPACE NEUROMA EXCISION;  Surgeon: Ollie Kovacs M.D.;  Location: [facility];  Service: Orthopedics     Family History   Problem Relation Age of Onset    Heart Disease Mother         CHF    Heart Disease Father         bypass age 49, 75     Social History     Socioeconomic History    Marital status: Single     Spouse name: Not on file    Number of children: Not on file    Years of education: Not on file    Highest education level: Not on file   Occupational History    Not on file   Tobacco Use    Smoking status: Never    Smokeless tobacco: Never   Vaping Use    Vaping Use: Never used   Substance and Sexual Activity    Alcohol use: Yes     Alcohol/week: 0.6 oz     Types: 1 Shots of liquor per week     Comment: once a month    Drug use: Not Currently    Sexual activity: Not on file   Other Topics Concern    Not on file   Social History Narrative    Not on file     Social Determinants of Health     Financial Resource Strain: Not on file   Food Insecurity: Not on file   Transportation Needs: Not on file   Physical Activity: Not on file   Stress: Not on file   Social Connections: Not on file   Intimate Partner Violence: Not on file   Housing Stability: Not on file     No Known Allergies  Outpatient Encounter Medications as of 4/12/2024   Medication Sig Dispense Refill    atorvastatin (LIPITOR) 40 MG Tab Take 1 Tablet by mouth at bedtime. 100 Tablet 3    carvedilol (COREG) 3.125 MG Tab Take 1 Tablet by mouth 2 times a day with meals. 200 Tablet 3    aspirin 81 MG EC tablet Take 1 Tablet by mouth every day. 100 Tablet 2    amiodarone (CORDARONE) 200 MG  "Tab TAKE 1 TABLET BY MOUTH EVERY DAY 90 Tablet 1    clopidogrel (PLAVIX) 75 MG Tab TAKE 1 TABLET BY MOUTH EVERY DAY 90 Tablet 2    acetaminophen (TYLENOL) 500 MG Tab Take 2 Tablets by mouth every 6 hours as needed for Mild Pain. (Patient not taking: Reported on 4/12/2024) 30 Tablet 0    omeprazole (PRILOSEC) 20 MG delayed-release capsule Take 1 Capsule by mouth every day. 30 Capsule 2     No facility-administered encounter medications on file as of 4/12/2024.     ROS           Objective     /64 (BP Location: Left arm, Patient Position: Sitting, BP Cuff Size: Adult)   Pulse (!) 59   Resp 16   Ht 1.778 m (5' 10\")   Wt 90.6 kg (199 lb 12.8 oz)   SpO2 95%   BMI 28.67 kg/m²     Physical Exam           Assessment & Plan     No diagnosis found.    Medical Decision Making: Today's Assessment/Status/Plan:                        " " Negative for abdominal pain, nausea and vomiting.   Genitourinary: Negative.    Musculoskeletal: Negative.  Negative for myalgias.   Skin: Negative.  Negative for rash.   Neurological: Negative.  Negative for dizziness, loss of consciousness, weakness and headaches.   Endo/Heme/Allergies: Negative.  Does not bruise/bleed easily.   Psychiatric/Behavioral: Negative.                Objective     /64 (BP Location: Left arm, Patient Position: Sitting, BP Cuff Size: Adult)   Pulse (!) 59   Resp 16   Ht 1.778 m (5' 10\")   Wt 90.6 kg (199 lb 12.8 oz)   SpO2 95%   BMI 28.67 kg/m²     Physical Exam  Vitals reviewed.   Constitutional:       General: He is not in acute distress.     Appearance: Normal appearance.   HENT:      Head: Normocephalic and atraumatic.      Right Ear: External ear normal.      Left Ear: External ear normal.   Eyes:      General: No scleral icterus.     Extraocular Movements: Extraocular movements intact.      Conjunctiva/sclera: Conjunctivae normal.      Pupils: Pupils are equal, round, and reactive to light.   Cardiovascular:      Rate and Rhythm: Normal rate and regular rhythm.      Pulses: Normal pulses.      Heart sounds: Normal heart sounds. No murmur heard.     No friction rub. No gallop.   Pulmonary:      Effort: Pulmonary effort is normal.      Breath sounds: Normal breath sounds.   Abdominal:      General: Bowel sounds are normal.      Palpations: Abdomen is soft.      Tenderness: There is no abdominal tenderness.   Musculoskeletal:         General: Normal range of motion.      Cervical back: Normal range of motion and neck supple.      Right lower leg: No edema.      Left lower leg: No edema.   Skin:     General: Skin is warm and dry.      Capillary Refill: Capillary refill takes less than 2 seconds.   Neurological:      General: No focal deficit present.      Mental Status: He is alert and oriented to person, place, and time.   Psychiatric:         Mood and Affect: Mood normal.  "        Behavior: Behavior normal.         Judgment: Judgment normal.       Lab Results   Component Value Date/Time    CHOLSTRLTOT 140 04/15/2024 06:42 AM    LDL 81 04/15/2024 06:42 AM    HDL 41 04/15/2024 06:42 AM    TRIGLYCERIDE 91 04/15/2024 06:42 AM       Lab Results   Component Value Date/Time    SODIUM 138 01/23/2024 12:29 AM    POTASSIUM 3.6 01/23/2024 12:29 AM    CHLORIDE 97 01/23/2024 12:29 AM    CO2 28 01/23/2024 12:29 AM    GLUCOSE 117 (H) 01/23/2024 12:29 AM    BUN 11 01/23/2024 12:29 AM    CREATININE 0.64 01/23/2024 12:29 AM     Lab Results   Component Value Date/Time    ALKPHOSPHAT 91 01/15/2024 01:11 PM    ASTSGOT 28 01/15/2024 01:11 PM    ALTSGPT 32 01/15/2024 01:11 PM    TBILIRUBIN 0.4 01/15/2024 01:11 PM       Cardiovascular imaging and procedures:     NM stress test 10/19/2023  NUCLEAR IMAGING INTERPRETATION   Questionable moderate sized reversible perfusion defect of the inferior wall.      SDS would be 3.   No focal wall motion abnormality seen.   LVEF 57%.   ECG INTERPRETATION   ST depression 1mm horizontal lateral leads at peak stress.     Echocardiogram 11/1/2023  CONCLUSIONS  Normal left ventricular systolic function. The ejection fraction is   measured to be 61 % by Reed's biplane.  Normal right ventricular size and systolic function.  Aortic sclerosis without stenosis. Mild aortic insufficiency.  Unable to estimate right ventricular systolic pressure due to an   inadequate tricuspid regurgitant jet.  Mild dilatation of ascending aorta 4.3cm.  No prior study is available for comparison.     [facility] 12/21/2023  HEMODYNAMICS:   Aortic pressure: 120 /70 mmHg  LVEDP: 18 mmHg  No significant aortic gradient on pullback  CORONARY ANGIOGRAPHY:  The left main coronary artery : Large-caliber vessel with mild luminal irregularities, bifurcates to LAD and left circumflex  The left anterior descending coronary artery : Large-caliber transapical vessel with calcified proximal/midportion with diffuse  disease throughout the proximal/mid LAD at 70%, iFR 0.75 suggestive of hemodynamically significant stenosis.  Left-to-right collaterals  The left circumflex coronary artery : Large-caliber vessel that gives rise to medium OM1 with diffuse nonobstructive CAD, large OM 2 that has severe 80% proximal stenosis before its bifurcation.  Left-to-right collaterals.  The right coronary artery  : Large-caliber dominant vessel with  of its midportion fills via left-to-right collaterals  INSTANTANEOUS WAVE FREE RATIO:  IFR of the proximal/mid left anterior descending coronary artery was 0.75: suggestive of a hemodynamically significant stenosis.  IMPRESSION:  Severe three-vessel CAD (mid RCA , mid cx-OM and prox/mid LAD IFR 0.75)  Mildly elevated resting LVEDP 18 mmHg with no significant transaortic gradient on pullback  RECOMMENDATIONS:  CT surgery have been consulted, they will arrange for outpatient consultation to discuss CABG as a revascularization option for this patient  Ongoing optimal secondary ASCVD prevention  TR band protocol         Assessment & Plan     1. Dyslipidemia  Lipid Profile      2. Postsurgical aortocoronary bypass status  EC-ECHOCARDIOGRAM COMPLETE W/O CONT    proBrain Natriuretic Peptide, NT    CANCELED: EC-ECHOCARDIOGRAM COMPLETE W/O CONT      3. S/P CABG x 3  EC-ECHOCARDIOGRAM COMPLETE W/O CONT    CANCELED: EC-ECHOCARDIOGRAM COMPLETE W/O CONT      4. Chest tightness  DX-CHEST-2 VIEWS    EC-ECHOCARDIOGRAM COMPLETE W/O CONT    proBrain Natriuretic Peptide, NT    CANCELED: EC-ECHOCARDIOGRAM COMPLETE W/O CONT      5. Essential hypertension, benign        6. Atrial fibrillation, unspecified type (HCC)            Medical Decision Making: Today's Assessment/Status/Plan:        CAD s/p CABG x3 (LIMA to LAD, RSVG to OM1, RSVG to PDA) 1/18/2024  Pleural effusion  - Asymptomatic; though does have some chest tightness at the sternal incision on inspiration.  - He completed 3 months of DAPT. Stop  clopidogrel and continue aspirin for life.  - LVEF 61% on previous echo. Given recent pleural effusion and chest tightness with inspiration, we will repeat a chest xray and echocardiogram.   - We will also complete nt-proBNP testing.   - Continue carvedilol 3.125mg BID.   - He was not interested in cardiac rehab.   - Continue atorvastatin.     Hypertension  - Blood pressure is well controlled  - Continue at home BP monitoring.  - Continue carvedilol 3.125mg BID.      Post op afib  - Asymptomatic  - Regular rate and rhythm on exam today.  - He completed 3 months of amiodarone therapy. We will discontinue this.   - Anticoagulation not currently indicated given no prior history and elicited post op after CABG.  - Continue coreg.     Dyslipidemia  -; goal <70  - Repeat lipid panel  - Continue atorvastatin 40mg daily. If LDL not at goal on repeat, will increase atorvastatin to 80mg daily.   - Continue diet and lifestyle modifications.      Follow up in 6 months or sooner with clinical changes.     Encouraged him to reach out via MyChart or telephone with any questions or concerns.     Thank you for allowing me to participate in the care of Joe Morelos .    Jessica Angel PA-C, Cardiology  [facility]  [facility], [address]  [address] [address]  Phone: [phone]  Fax: [phone]     PLEASE NOTE: This note was created using voice recognition software. I have made every reasonable attempt to correct obvious errors, but I expect that there are errors of grammar and possibly content that I did not discover before finalizing the note.

## 2024-04-15 ENCOUNTER — HOSPITAL ENCOUNTER (OUTPATIENT)
Dept: LAB | Facility: MEDICAL CENTER | Age: 69
End: 2024-04-15
Attending: PHYSICIAN ASSISTANT
Payer: MEDICARE

## 2024-04-15 DIAGNOSIS — E78.5 DYSLIPIDEMIA: ICD-10-CM

## 2024-04-15 DIAGNOSIS — Z95.1 POSTSURGICAL AORTOCORONARY BYPASS STATUS: ICD-10-CM

## 2024-04-15 DIAGNOSIS — R07.89 CHEST TIGHTNESS: ICD-10-CM

## 2024-04-15 LAB
CHOLEST SERPL-MCNC: 140 MG/DL (ref 100–199)
HDLC SERPL-MCNC: 41 MG/DL
LDLC SERPL CALC-MCNC: 81 MG/DL
NT-PROBNP SERPL IA-MCNC: 267 PG/ML (ref 0–125)
TRIGL SERPL-MCNC: 91 MG/DL (ref 0–149)

## 2024-04-15 PROCEDURE — 36415 COLL VENOUS BLD VENIPUNCTURE: CPT

## 2024-04-15 PROCEDURE — 83880 ASSAY OF NATRIURETIC PEPTIDE: CPT

## 2024-04-15 PROCEDURE — 80061 LIPID PANEL: CPT

## 2024-04-17 ENCOUNTER — HOSPITAL ENCOUNTER (OUTPATIENT)
Dept: CARDIOLOGY | Facility: MEDICAL CENTER | Age: 69
End: 2024-04-17
Attending: PHYSICIAN ASSISTANT
Payer: MEDICARE

## 2024-04-17 DIAGNOSIS — Z95.1 POSTSURGICAL AORTOCORONARY BYPASS STATUS: ICD-10-CM

## 2024-04-17 DIAGNOSIS — R07.89 CHEST TIGHTNESS: ICD-10-CM

## 2024-04-17 DIAGNOSIS — Z95.1 S/P CABG X 3: ICD-10-CM

## 2024-04-17 LAB
LV EJECT FRACT  99904: 63
LV EJECT FRACT MOD 2C 99903: 59.12
LV EJECT FRACT MOD 4C 99902: 70.52
LV EJECT FRACT MOD BP 99901: 63.25

## 2024-04-17 PROCEDURE — 93306 TTE W/DOPPLER COMPLETE: CPT

## 2024-04-17 PROCEDURE — 93306 TTE W/DOPPLER COMPLETE: CPT | Mod: 26 | Performed by: INTERNAL MEDICINE

## 2024-04-18 ENCOUNTER — TELEPHONE (OUTPATIENT)
Dept: CARDIOLOGY | Facility: MEDICAL CENTER | Age: 69
End: 2024-04-18
Payer: MEDICARE

## 2024-04-18 DIAGNOSIS — E78.5 DYSLIPIDEMIA: ICD-10-CM

## 2024-04-18 DIAGNOSIS — I50.31 ACUTE HEART FAILURE WITH PRESERVED EJECTION FRACTION (HCC): ICD-10-CM

## 2024-04-18 RX ORDER — FUROSEMIDE 20 MG/1
20 TABLET ORAL PRN
Qty: 30 TABLET | Refills: 2 | Status: SHIPPED | OUTPATIENT
Start: 2024-04-18

## 2024-04-18 RX ORDER — ATORVASTATIN CALCIUM 80 MG/1
80 TABLET, FILM COATED ORAL NIGHTLY
Qty: 90 TABLET | Refills: 3 | Status: SHIPPED | OUTPATIENT
Start: 2024-04-18

## 2024-04-18 NOTE — TELEPHONE ENCOUNTER
Jessica Angel P.A.-C.       I will call him after 3 before end of day to discuss all of his recent test results

## 2024-04-18 NOTE — TELEPHONE ENCOUNTER
PUMP LINE CALL    Pt called on pump line, stated he did his echo and there is some fluid build up(?), pt is a little concerned, is leaving out of town for 2weeks tomorrow. Pt stated he might need more of his afib meds as well he only has a few left. Would like Jessica to call him back or a nurse. Today is good after 3pm, or tomorrow before 8am.    Thank you!

## 2024-04-19 NOTE — TELEPHONE ENCOUNTER
BILLIE Escobar personally discussed all results with him. Effusion on xray is stable. No need for thoracentesis at this time. Some changes on echo with right sided systolic dysfunction which could be contributing to the effusion and maybe some HFpEF, but his exercise tolerance is excellent and his ntproBNP is normal for his age. We will still  trial some lasix to see if this helps. LDL is not at goal. We will increase atorvastatin to 80mg daily. All of his questions were answered and he was appreciative for the call.

## 2024-05-03 ASSESSMENT — ENCOUNTER SYMPTOMS
PND: 0
PSYCHIATRIC NEGATIVE: 1
SHORTNESS OF BREATH: 0
DIZZINESS: 0
BLURRED VISION: 0
CLAUDICATION: 0
GASTROINTESTINAL NEGATIVE: 1
DOUBLE VISION: 0
VOMITING: 0
CHILLS: 0
BRUISES/BLEEDS EASILY: 0
NEUROLOGICAL NEGATIVE: 1
EYES NEGATIVE: 1
MUSCULOSKELETAL NEGATIVE: 1
ABDOMINAL PAIN: 0
ORTHOPNEA: 0
PALPITATIONS: 0
COUGH: 0
MYALGIAS: 0
LOSS OF CONSCIOUSNESS: 0
NAUSEA: 0
FEVER: 0
HEADACHES: 0
CONSTITUTIONAL NEGATIVE: 1
WEAKNESS: 0

## 2024-05-11 DIAGNOSIS — I50.31 ACUTE HEART FAILURE WITH PRESERVED EJECTION FRACTION (HCC): ICD-10-CM

## 2024-05-13 RX ORDER — FUROSEMIDE 20 MG/1
20 TABLET ORAL PRN
Qty: 90 TABLET | Refills: 1 | Status: SHIPPED | OUTPATIENT
Start: 2024-05-13

## 2024-05-16 NOTE — THERAPY
Physical Therapy Contact Note    PT consult received and acknowledged. EMR indicates patient is POD1 following OHS. Will initiate PT evaluation POD2 per protocol as able and appropriate.     Daphne Merino, PT, DPT  [phone]     What Type Of Note Output Would You Prefer (Optional)?: Bullet Format How Severe Is Your Rash?: moderate Is This A New Presentation, Or A Follow-Up?: Rash

## 2024-05-29 ENCOUNTER — TELEPHONE (OUTPATIENT)
Dept: CARDIOLOGY | Facility: MEDICAL CENTER | Age: 69
End: 2024-05-29
Payer: MEDICARE

## 2024-05-29 DIAGNOSIS — E78.5 DYSLIPIDEMIA: ICD-10-CM

## 2024-05-29 NOTE — TELEPHONE ENCOUNTER
----- Message from Jessica Angel P.A.-C. sent at 5/29/2024 11:05 AM PDT -----  Please have him repeat a lipid panel.

## 2024-05-29 NOTE — TELEPHONE ENCOUNTER
----- Message from Jessica Angel P.A.-C. sent at 5/29/2024 11:05 AM PDT -----  Please have him repeat a lipid panel.             Phone number called: [phone] (home)      Call outcome: Left message for patient to call back. Labs ordered.

## 2024-06-11 ENCOUNTER — HOSPITAL ENCOUNTER (OUTPATIENT)
Dept: LAB | Facility: MEDICAL CENTER | Age: 69
End: 2024-06-11
Attending: PHYSICIAN ASSISTANT
Payer: MEDICARE

## 2024-06-11 ENCOUNTER — TELEPHONE (OUTPATIENT)
Dept: CARDIOLOGY | Facility: MEDICAL CENTER | Age: 69
End: 2024-06-11
Payer: MEDICARE

## 2024-06-11 DIAGNOSIS — E78.5 DYSLIPIDEMIA: ICD-10-CM

## 2024-06-11 LAB
CHOLEST SERPL-MCNC: 134 MG/DL (ref 100–199)
FASTING STATUS PATIENT QL REPORTED: NORMAL
HDLC SERPL-MCNC: 47 MG/DL
LDLC SERPL CALC-MCNC: 73 MG/DL
TRIGL SERPL-MCNC: 68 MG/DL (ref 0–149)

## 2024-06-11 PROCEDURE — 80061 LIPID PANEL: CPT

## 2024-06-11 PROCEDURE — 36415 COLL VENOUS BLD VENIPUNCTURE: CPT

## 2024-06-11 NOTE — TELEPHONE ENCOUNTER
PUMP LINE CALL  AM     PT CALLED IN ON PUMP LINE, GOT HIS BLOOD WORK DONE. WOULD LIKE ASHLEIGH TO CALL HIM BACK TO GO OVER RESULTS, OR HER NURSE.  PT WOULD LIKE A CB AFTER 4PM ANY DAY.  THANK YOU!!

## 2024-06-11 NOTE — TELEPHONE ENCOUNTER
Phone number called: [phone] (home)      Call outcome: I spoke to pt let him know that AM is still on vacation. Labs WNL

## 2024-06-17 RX ORDER — EZETIMIBE 10 MG/1
10 TABLET ORAL DAILY
Qty: 90 TABLET | Refills: 3 | Status: SHIPPED | OUTPATIENT
Start: 2024-06-17

## 2024-06-17 NOTE — TELEPHONE ENCOUNTER
BILLIE Escobar; Enrique Cherry R.N.  Caller: Unspecified (6 days ago,  3:48 PM)  Cholesterol still not at goal. Start zetia 10mg daily and repeat lipid panel in 3 months.       Phone number called: [phone] (home)      Call outcome: Spoke with patient and let them know AM's message.

## 2024-07-16 ENCOUNTER — TELEPHONE (OUTPATIENT)
Dept: CARDIOTHORACIC SURGERY | Facility: MEDICAL CENTER | Age: 69
End: 2024-07-16
Payer: MEDICARE

## 2024-07-23 ENCOUNTER — TELEPHONE (OUTPATIENT)
Dept: CARDIOLOGY | Facility: MEDICAL CENTER | Age: 69
End: 2024-07-23
Payer: MEDICARE

## 2024-07-30 ENCOUNTER — OFFICE VISIT (OUTPATIENT)
Dept: CARDIOLOGY | Facility: MEDICAL CENTER | Age: 69
End: 2024-07-30
Attending: PHYSICIAN ASSISTANT
Payer: MEDICARE

## 2024-07-30 ENCOUNTER — TELEPHONE (OUTPATIENT)
Dept: CARDIOLOGY | Facility: MEDICAL CENTER | Age: 69
End: 2024-07-30
Payer: MEDICARE

## 2024-07-30 VITALS
SYSTOLIC BLOOD PRESSURE: 92 MMHG | RESPIRATION RATE: 16 BRPM | WEIGHT: 198 LBS | HEIGHT: 70 IN | DIASTOLIC BLOOD PRESSURE: 60 MMHG | HEART RATE: 75 BPM | OXYGEN SATURATION: 95 % | BODY MASS INDEX: 28.35 KG/M2

## 2024-07-30 DIAGNOSIS — I20.9 ANGINA PECTORIS (HCC): ICD-10-CM

## 2024-07-30 DIAGNOSIS — Z95.1 HX OF CABG: ICD-10-CM

## 2024-07-30 DIAGNOSIS — R07.89 CHEST TIGHTNESS: ICD-10-CM

## 2024-07-30 DIAGNOSIS — R07.9 CHEST PAIN, UNSPECIFIED TYPE: ICD-10-CM

## 2024-07-30 DIAGNOSIS — Z95.1 S/P CABG X 3: ICD-10-CM

## 2024-07-30 DIAGNOSIS — I50.31 ACUTE HEART FAILURE WITH PRESERVED EJECTION FRACTION (HCC): ICD-10-CM

## 2024-07-30 DIAGNOSIS — I48.91 ATRIAL FIBRILLATION, UNSPECIFIED TYPE (HCC): ICD-10-CM

## 2024-07-30 DIAGNOSIS — I10 ESSENTIAL HYPERTENSION, BENIGN: ICD-10-CM

## 2024-07-30 DIAGNOSIS — E78.5 DYSLIPIDEMIA: ICD-10-CM

## 2024-07-30 DIAGNOSIS — Z95.1 POSTSURGICAL AORTOCORONARY BYPASS STATUS: ICD-10-CM

## 2024-07-30 LAB — EKG IMPRESSION: NORMAL

## 2024-07-30 PROCEDURE — 93005 ELECTROCARDIOGRAM TRACING: CPT | Performed by: PHYSICIAN ASSISTANT

## 2024-07-30 PROCEDURE — 99212 OFFICE O/P EST SF 10 MIN: CPT | Performed by: PHYSICIAN ASSISTANT

## 2024-07-30 RX ORDER — RANOLAZINE 500 MG/1
500 TABLET, EXTENDED RELEASE ORAL 2 TIMES DAILY
Qty: 60 TABLET | Refills: 3 | Status: SHIPPED | OUTPATIENT
Start: 2024-07-30

## 2024-07-30 ASSESSMENT — FIBROSIS 4 INDEX: FIB4 SCORE: 1.25

## 2024-07-31 ENCOUNTER — TELEPHONE (OUTPATIENT)
Dept: RADIOLOGY | Facility: MEDICAL CENTER | Age: 69
End: 2024-07-31
Payer: MEDICARE

## 2024-07-31 ENCOUNTER — APPOINTMENT (OUTPATIENT)
Dept: ADMISSIONS | Facility: MEDICAL CENTER | Age: 69
End: 2024-07-31
Attending: INTERNAL MEDICINE
Payer: MEDICARE

## 2024-07-31 PROBLEM — T84.84XA PAINFUL ORTHOPAEDIC HARDWARE (HCC): Status: ACTIVE | Noted: 2024-07-31

## 2024-07-31 ASSESSMENT — ENCOUNTER SYMPTOMS
CHILLS: 0
HEADACHES: 0
DIZZINESS: 0
ORTHOPNEA: 0
GASTROINTESTINAL NEGATIVE: 1
EYES NEGATIVE: 1
NAUSEA: 0
ABDOMINAL PAIN: 0
MUSCULOSKELETAL NEGATIVE: 1
CONSTITUTIONAL NEGATIVE: 1
BRUISES/BLEEDS EASILY: 0
PND: 0
LOSS OF CONSCIOUSNESS: 0
WEAKNESS: 0
NEUROLOGICAL NEGATIVE: 1
BLURRED VISION: 0
MYALGIAS: 0
CLAUDICATION: 0
FEVER: 0
COUGH: 0
SHORTNESS OF BREATH: 1
VOMITING: 0
PSYCHIATRIC NEGATIVE: 1
PALPITATIONS: 0
DOUBLE VISION: 0

## 2024-08-01 RX ORDER — EZETIMIBE 10 MG/1
10 TABLET ORAL DAILY
COMMUNITY

## 2024-08-01 RX ORDER — ATORVASTATIN CALCIUM 80 MG/1
80 TABLET, FILM COATED ORAL NIGHTLY
COMMUNITY

## 2024-08-01 ASSESSMENT — FIBROSIS 4 INDEX: FIB4 SCORE: 1.25

## 2024-08-05 ENCOUNTER — PRE-ADMISSION TESTING (OUTPATIENT)
Dept: ADMISSIONS | Facility: MEDICAL CENTER | Age: 69
End: 2024-08-05
Payer: MEDICARE

## 2024-08-05 NOTE — OR NURSING
Attempted RN pre admit tele red chart call.  Voicemail left asking for call back today to complete pre admit.

## 2024-08-06 ENCOUNTER — APPOINTMENT (OUTPATIENT)
Dept: CARDIOLOGY | Facility: MEDICAL CENTER | Age: 69
End: 2024-08-06
Attending: PHYSICIAN ASSISTANT
Payer: MEDICARE

## 2024-08-06 ENCOUNTER — HOSPITAL ENCOUNTER (OUTPATIENT)
Facility: MEDICAL CENTER | Age: 69
End: 2024-08-06
Attending: INTERNAL MEDICINE | Admitting: INTERNAL MEDICINE
Payer: MEDICARE

## 2024-08-06 VITALS
RESPIRATION RATE: 16 BRPM | BODY MASS INDEX: 27.62 KG/M2 | OXYGEN SATURATION: 96 % | SYSTOLIC BLOOD PRESSURE: 132 MMHG | WEIGHT: 192.9 LBS | DIASTOLIC BLOOD PRESSURE: 80 MMHG | HEART RATE: 60 BPM | HEIGHT: 70 IN | TEMPERATURE: 97.8 F

## 2024-08-06 DIAGNOSIS — Z95.1 S/P CABG X 3: ICD-10-CM

## 2024-08-06 DIAGNOSIS — R07.89 CHEST TIGHTNESS: ICD-10-CM

## 2024-08-06 LAB
ALBUMIN SERPL BCP-MCNC: 4.3 G/DL (ref 3.2–4.9)
ALBUMIN/GLOB SERPL: 1.4 G/DL
ALP SERPL-CCNC: 74 U/L (ref 30–99)
ALT SERPL-CCNC: 24 U/L (ref 2–50)
ANION GAP SERPL CALC-SCNC: 13 MMOL/L (ref 7–16)
APTT PPP: 28 SEC (ref 24.7–36)
AST SERPL-CCNC: 18 U/L (ref 12–45)
BILIRUB SERPL-MCNC: 0.9 MG/DL (ref 0.1–1.5)
BUN SERPL-MCNC: 17 MG/DL (ref 8–22)
CALCIUM ALBUM COR SERPL-MCNC: 8.8 MG/DL (ref 8.5–10.5)
CALCIUM SERPL-MCNC: 9 MG/DL (ref 8.5–10.5)
CHLORIDE SERPL-SCNC: 103 MMOL/L (ref 96–112)
CO2 SERPL-SCNC: 21 MMOL/L (ref 20–33)
CREAT SERPL-MCNC: 0.76 MG/DL (ref 0.5–1.4)
EKG IMPRESSION: NORMAL
EKG IMPRESSION: NORMAL
ERYTHROCYTE [DISTWIDTH] IN BLOOD BY AUTOMATED COUNT: 44.5 FL (ref 35.9–50)
GFR SERPLBLD CREATININE-BSD FMLA CKD-EPI: 97 ML/MIN/1.73 M 2
GLOBULIN SER CALC-MCNC: 3 G/DL (ref 1.9–3.5)
GLUCOSE SERPL-MCNC: 112 MG/DL (ref 65–99)
HCT VFR BLD AUTO: 46.9 % (ref 42–52)
HGB BLD-MCNC: 16.4 G/DL (ref 14–18)
INR PPP: 0.98 (ref 0.87–1.13)
MCH RBC QN AUTO: 30.9 PG (ref 27–33)
MCHC RBC AUTO-ENTMCNC: 35 G/DL (ref 32.3–36.5)
MCV RBC AUTO: 88.5 FL (ref 81.4–97.8)
PLATELET # BLD AUTO: 265 K/UL (ref 164–446)
PMV BLD AUTO: 8.4 FL (ref 9–12.9)
POTASSIUM SERPL-SCNC: 4.3 MMOL/L (ref 3.6–5.5)
PROT SERPL-MCNC: 7.3 G/DL (ref 6–8.2)
PROTHROMBIN TIME: 13.1 SEC (ref 12–14.6)
RBC # BLD AUTO: 5.3 M/UL (ref 4.7–6.1)
SODIUM SERPL-SCNC: 137 MMOL/L (ref 135–145)
WBC # BLD AUTO: 6.7 K/UL (ref 4.8–10.8)

## 2024-08-06 PROCEDURE — 93571 IV DOP VEL&/PRESS C FLO 1ST: CPT | Mod: 26,52,RC | Performed by: INTERNAL MEDICINE

## 2024-08-06 PROCEDURE — 160002 HCHG RECOVERY MINUTES (STAT)

## 2024-08-06 PROCEDURE — 85610 PROTHROMBIN TIME: CPT

## 2024-08-06 PROCEDURE — 160046 HCHG PACU - 1ST 60 MINS PHASE II

## 2024-08-06 PROCEDURE — 85730 THROMBOPLASTIN TIME PARTIAL: CPT

## 2024-08-06 PROCEDURE — 160035 HCHG PACU - 1ST 60 MINS PHASE I

## 2024-08-06 PROCEDURE — 160036 HCHG PACU - EA ADDL 30 MINS PHASE I

## 2024-08-06 PROCEDURE — 700117 HCHG RX CONTRAST REV CODE 255: Performed by: INTERNAL MEDICINE

## 2024-08-06 PROCEDURE — 99152 MOD SED SAME PHYS/QHP 5/>YRS: CPT | Performed by: INTERNAL MEDICINE

## 2024-08-06 PROCEDURE — 700102 HCHG RX REV CODE 250 W/ 637 OVERRIDE(OP)

## 2024-08-06 PROCEDURE — 160047 HCHG PACU  - EA ADDL 30 MINS PHASE II

## 2024-08-06 PROCEDURE — 80053 COMPREHEN METABOLIC PANEL: CPT

## 2024-08-06 PROCEDURE — 85027 COMPLETE CBC AUTOMATED: CPT

## 2024-08-06 PROCEDURE — 93010 ELECTROCARDIOGRAM REPORT: CPT | Mod: 76 | Performed by: INTERNAL MEDICINE

## 2024-08-06 PROCEDURE — 93010 ELECTROCARDIOGRAM REPORT: CPT | Performed by: INTERNAL MEDICINE

## 2024-08-06 PROCEDURE — 700111 HCHG RX REV CODE 636 W/ 250 OVERRIDE (IP): Mod: JZ

## 2024-08-06 PROCEDURE — 700105 HCHG RX REV CODE 258: Performed by: INTERNAL MEDICINE

## 2024-08-06 PROCEDURE — A9270 NON-COVERED ITEM OR SERVICE: HCPCS

## 2024-08-06 PROCEDURE — 92978 ENDOLUMINL IVUS OCT C 1ST: CPT | Mod: 26,LC | Performed by: INTERNAL MEDICINE

## 2024-08-06 PROCEDURE — 93459 L HRT ART/GRFT ANGIO: CPT | Mod: 26,59 | Performed by: INTERNAL MEDICINE

## 2024-08-06 PROCEDURE — 93005 ELECTROCARDIOGRAM TRACING: CPT | Performed by: INTERNAL MEDICINE

## 2024-08-06 PROCEDURE — 92978 ENDOLUMINL IVUS OCT C 1ST: CPT

## 2024-08-06 PROCEDURE — 700111 HCHG RX REV CODE 636 W/ 250 OVERRIDE (IP): Mod: JZ | Performed by: INTERNAL MEDICINE

## 2024-08-06 PROCEDURE — 700101 HCHG RX REV CODE 250

## 2024-08-06 PROCEDURE — 92937 PRQ TRLUML REVSC CAB GRF 1: CPT | Mod: LC | Performed by: INTERNAL MEDICINE

## 2024-08-06 RX ORDER — CLOPIDOGREL 300 MG/1
600 TABLET, FILM COATED ORAL ONCE
Status: DISCONTINUED | OUTPATIENT
Start: 2024-08-06 | End: 2024-08-06

## 2024-08-06 RX ORDER — HEPARIN SODIUM 200 [USP'U]/100ML
INJECTION, SOLUTION INTRAVENOUS
Status: COMPLETED
Start: 2024-08-06 | End: 2024-08-06

## 2024-08-06 RX ORDER — CLOPIDOGREL BISULFATE 75 MG/1
75 TABLET ORAL DAILY
Status: DISCONTINUED | OUTPATIENT
Start: 2024-08-07 | End: 2024-08-06 | Stop reason: HOSPADM

## 2024-08-06 RX ORDER — VERAPAMIL HYDROCHLORIDE 2.5 MG/ML
INJECTION, SOLUTION INTRAVENOUS
Status: COMPLETED
Start: 2024-08-06 | End: 2024-08-06

## 2024-08-06 RX ORDER — HEPARIN SODIUM 1000 [USP'U]/ML
INJECTION, SOLUTION INTRAVENOUS; SUBCUTANEOUS
Status: COMPLETED
Start: 2024-08-06 | End: 2024-08-06

## 2024-08-06 RX ORDER — SODIUM CHLORIDE 9 MG/ML
INJECTION, SOLUTION INTRAVENOUS CONTINUOUS
Status: DISCONTINUED | OUTPATIENT
Start: 2024-08-06 | End: 2024-08-06 | Stop reason: HOSPADM

## 2024-08-06 RX ORDER — LIDOCAINE HYDROCHLORIDE 20 MG/ML
INJECTION, SOLUTION INFILTRATION; PERINEURAL
Status: COMPLETED
Start: 2024-08-06 | End: 2024-08-06

## 2024-08-06 RX ORDER — BIVALIRUDIN 250 MG/5ML
INJECTION, POWDER, LYOPHILIZED, FOR SOLUTION INTRAVENOUS
Status: COMPLETED
Start: 2024-08-06 | End: 2024-08-06

## 2024-08-06 RX ORDER — CLOPIDOGREL 300 MG/1
TABLET, FILM COATED ORAL
Status: COMPLETED
Start: 2024-08-06 | End: 2024-08-06

## 2024-08-06 RX ORDER — MIDAZOLAM HYDROCHLORIDE 1 MG/ML
INJECTION INTRAMUSCULAR; INTRAVENOUS
Status: COMPLETED
Start: 2024-08-06 | End: 2024-08-06

## 2024-08-06 RX ORDER — ASPIRIN 81 MG/1
81 TABLET ORAL DAILY
Status: DISCONTINUED | OUTPATIENT
Start: 2024-08-07 | End: 2024-08-06 | Stop reason: HOSPADM

## 2024-08-06 RX ORDER — CLOPIDOGREL BISULFATE 75 MG/1
75 TABLET ORAL DAILY
Qty: 100 TABLET | Refills: 3 | Status: SHIPPED | OUTPATIENT
Start: 2024-08-06

## 2024-08-06 RX ADMIN — IOHEXOL 98 ML: 350 INJECTION, SOLUTION INTRAVENOUS at 09:51

## 2024-08-06 RX ADMIN — BIVALIRUDIN 250 MG: 250 INJECTION, POWDER, LYOPHILIZED, FOR SOLUTION INTRAVENOUS at 09:15

## 2024-08-06 RX ADMIN — NITROGLYCERIN 10 ML: 20 INJECTION INTRAVENOUS at 08:50

## 2024-08-06 RX ADMIN — LIDOCAINE HYDROCHLORIDE: 20 INJECTION, SOLUTION INFILTRATION; PERINEURAL at 08:49

## 2024-08-06 RX ADMIN — VERAPAMIL HYDROCHLORIDE 5 MG: 2.5 INJECTION, SOLUTION INTRAVENOUS at 08:50

## 2024-08-06 RX ADMIN — CLOPIDOGREL BISULFATE 600 MG: 300 TABLET, FILM COATED ORAL at 09:52

## 2024-08-06 RX ADMIN — HEPARIN SODIUM 2000 UNITS: 200 INJECTION, SOLUTION INTRAVENOUS at 08:50

## 2024-08-06 RX ADMIN — FENTANYL CITRATE 100 MCG: 50 INJECTION, SOLUTION INTRAMUSCULAR; INTRAVENOUS at 09:03

## 2024-08-06 RX ADMIN — HEPARIN SODIUM: 1000 INJECTION, SOLUTION INTRAVENOUS; SUBCUTANEOUS at 08:49

## 2024-08-06 RX ADMIN — BIVALIRUDIN 0.2 MG/KG/HR: 250 INJECTION, POWDER, LYOPHILIZED, FOR SOLUTION INTRAVENOUS at 12:45

## 2024-08-06 RX ADMIN — MIDAZOLAM HYDROCHLORIDE 2 MG: 1 INJECTION, SOLUTION INTRAMUSCULAR; INTRAVENOUS at 09:03

## 2024-08-06 ASSESSMENT — FIBROSIS 4 INDEX: FIB4 SCORE: 1.25

## 2024-08-06 NOTE — OR NURSING
1005 - patient arrived to pacu.  2 identifiers verified, attached to monitors, alarms/parameters verified, and received report.  Right groin site assessed with cath lab RN.  Site is CDI, soft with gauze and tegaderm dressing in place.  Oriented to unit and plan of care discussed.       1009 APRN at bedside.     1020 belongings returned to patient, friend updated of  time around 4 pm.    1137 bedrest orders clarified with MD, 2 hours best rest total, bedrest to end at 1205.     1210 2 hours bed rest complete, patient up to bathroom steady on feet, returned to bed, no bleeding at groin site.     1500 site with scant pink tinged drainage, site soft.    1538 patient up to bathroom and ambulated in unit, groin site post walk CDI and soft. No bleeding noted.     1602 Pt. discharged (home). With (friend). RN instructed patient on discharge instructions and gave pt copy of instructions.

## 2024-08-06 NOTE — DISCHARGE INSTRUCTIONS
POST ANGIOGRAM  General Care Instructions  Maintain a bandage over the incision site for 24 hours.  It's normal to find a small bruise or dime-sized lump at the insertion site. This should disappear within a few weeks.  Do not apply lotions or powders to the site.  Do not immerse the catheter insertion site in water (bathtub/swimming) for five days. It is ok to shower 24 hours after the procedure.  You may resume your normal diet immediately; on the day of your procedure, drink 6-10 glasses of water to help flush the contrast liquid out of your system.  If the doctor inserted the catheter in through your groin:  Walking short distances on a flat surface is OK. Limit going up/down stairs for the first 2 days.  DO NOT do yard work, drive, squat, lift heavy objects, or play sports for 2 days; or until your health care provider tells you it is OK.  No lifting over 10lbs for 1 week    Medications  If your current medications need to be changed, you will be provided with an updated list of your medications prior to discharge.  If you take warfarin (Coumadin), resume taking your usual dose the evening after the procedure.  DO NOT STOP taking prescribed blood thinning (anti-platelet) medications unless instructed by your cardiologist.  These medications include:  Aspirin, Clopidogrel (Plavix), Ticagrelor (Brilinta), or Prasugrel (Effient)   If you take one of the following anticoagulants, RESUME 24 HOURS after your procedure:  Apixiban (Eliquis), Rivaroxaban (Xarelto), Dabigatran (Pradaxa), Edoxaban (Savaysa)  If you take metformin (Glucophage), RESUME 48 HOURS after your procedure.    When to call your healthcare provider  Call your cardiologist right away at [phone] if you have any of the following:   Problems/Concerns taking any of your prescribed heart medicines.   The insertion site has increasing pain, swelling, redness, bleeding, or drainage.   Your arm or leg below where the insertion site changes color, is  cool, or is numb.   You have chest pain or shortness of breath that does not go away with rest.   Your pulse feels irregular -- very slow (less than 60 beats/minute) or very fast (over 100 beats/minute).   You have dizziness, fainting, or you are very tired.   You are coughing up blood or yellow or green mucus.   You have chills or a fever over 101°F (38.3°C).    If there is bleeding at the catheter insertion site, apply pressure for 10 minutes.  If bleeding persists, call 911, and continue to hold pressure until advanced medical support arrives.        Exercising Safely After Percutaneous Coronary Intervention (PCI)  After percutaneous coronary intervention (PCI), which involves angioplasty and often stenting, it's important to focus on your heart health. Exercise can help strengthen your heart. It can also help you feel good and improve your overall health. Talk with your health care provider or cardiac rehab team member about good options for you.  Start slowly. Work up to more vigorous exercise as you get stronger. Aim for at least 150 minutes of exercise each week.  Include aerobic activities. These make the heart beat faster. They work the heart and lungs, and improve the body's ability to use oxygen. Good choices include walking, swimming, and biking .  Always follow your doctor's recommendation for exercise.   You have been referred to cardiac rehabilitation, which is important for your recovery.  You may [facility] at [phone] to learn more and schedule a visit.        Lifestyle Management After Percutaneous Coronary Intervention (PCI)  Percutaneous coronary intervention (PCI)  involves angioplasty and often stenting. This procedure can open arteries and relieve symptoms. But, it doesn't cure coronary artery disease. New blockages can still form. You need to take steps to prevent this by managing risk factors. Doing so will help make your heart and arteries healthier.  Your doctor may prescribe cardiac rehabilitation to help with this lifelong process.  Understanding risk factors  Some risk factors for coronary artery disease can be controlled. These include smoking, high blood pressure, cholesterol, diabetes, and obesity. They can be managed with medication, diet, and exercise. Support and counseling can also play a role. The effort will pay off! Managing risk factors can help you be more active, feel better, and reduce the risk of heart attack.    If you smoke, quit!  If your doctor has been urging you to quit smoking, it's for good reasons. Smoking damages your heart, blood vessels, and lungs. The good news is that quitting can halt or even reverse the damage of smoking. To quit now:  Get medical help. Ask your doctor for advice on stop-smoking programs. Also ask about medications or nicotine replacement therapy products that may help you quit smoking.  Get support. Join a support group. Ask for help from your family and friends.  Don't give up. It often takes several tries to succeed in quitting smoking.  Avoid secondhand smoke. Ask family and friends not to smoke around you.     What to Expect Post Sedation    Rest and take it easy for the first 24 hours.  A responsible adult is recommended to remain with you during that time.  It is normal to feel sleepy.  We encourage you to not do anything that requires balance, judgment or coordination.    FOR 24 HOURS DO NOT:  Drive, operate machinery or run household appliances.  Drink beer or alcoholic beverages.  Make important decisions or sign legal documents.    To avoid nausea, slowly advance diet as tolerated, avoiding spicy or greasy foods for the first day.  Add more substantial food to your diet according to your provider's instructions.  INCREASE FLUIDS AND FIBER TO AVOID CONSTIPATION.    MILD FLU-LIKE SYMPTOMS ARE NORMAL.  YOU MAY EXPERIENCE GENERALIZED MUSCLE ACHES, THROAT IRRITATION, HEADACHE AND/OR SOME NAUSEA.    If any  questions arise, call your provider.  If your provider is not available, please feel free to call the Surgical Center at [phone].    MEDICATIONS: Resume taking daily medication.  Take prescribed pain medication with food.  If no medication is prescribed, you may take non-aspirin pain medication if needed.  PAIN MEDICATION CAN BE VERY CONSTIPATING.  Take a stool softener or laxative such as senokot, pericolace, or milk of magnesia if needed.    Last pain medication given at

## 2024-08-06 NOTE — PROCEDURES
"CARDIAC CATHETERIZATION REPORT    REFERRING: ESPERANZA Escobar    PROCEDURE PHYSICIAN: Carter Morris MD, [facility], [facility]  ASSISTANT: None    IMPRESSIONS:  1.  Angina with unsatisfactory control despite medical therapy due to neointimal hyperplasia within the vein graft to the OM 2  2.  Successful PCI of the vein graft to OM 2 using 2 overlapping drug-eluting stents, IVUS  3.  Nonflow limiting stenosis in the vein graft to the RCA  4.  Obstructive three-vessel coronary artery disease with patent bypass grafts  5.  Mild elevation LVEDP at 18 mmHg    Recommendations:  Dual platelet therapy for 6 months    Pre-procedure diagnosis:  Recurrent angina following bypass  Post-procedure diagnosis: Same    Procedure performed  Selective coronary angiography  Left heart catheterization  Bypass graft angiography  Percutaneous coronary intervention - Stent Placement (VG to OM 2)  Intravascular Ultrasound (VG to OM 2)  Instantaneous wave free ratio (iFR) (VG to RCA)    Conscious sedation was supervised by myself and administered by trained personnel using fentanyl and versed between 846 and 949. The patient tolerated sedation without complication.     Procedure Description  1. Access: 6 Citizen of [place] right femoral artery Micropuncture technique was utilized following local anesthesia with lidocaine.  Fluoroscopic guidance was utilized for femoral access Dynamic ultrasound was utilized to gain access    2. Diagnostic description: The catheter was passed to the central circulation with the aide of J tipped 0.35\" wire. A 6F JR4, 6F JL4, and 6F MUNA were used to inject the coronary circulation, bypass grafts  and enter the left ventricle during invasive hemodynamic monitoring.     3. Description of Intervention: After confirming therapeutic anticoagulation intervention proceeded. A 6F JR4 guiding catheter was used. The lesion in the vein graft to OM 2 was crossed with a 0.014\" [place] wire.  I performed IVUS which demonstrated neointimal " hyperplasia in the proximal one third of the vein graft with corresponding severe stenosis.  I then exchanged for a 5.0 spider filter wire and then performed angioplasty with a 4.0 balloon.  I then deployed overlapping 4.0 x 38 mm and 4.0 x 24 mm Synergy XD LISA stents.  The stents are posted with a 4.5 NC balloon throughout.  I then repeated IVUS which demonstrated further opportunity for stent expansion.  And performed dilation of the proximal two thirds of stent length with a 5.0 balloon.  Angiogram showed excellent results in the spider filter device was removed.  I then removed the JR4 and introduced a multipurpose guide which was used to perform IFR of the vein graft to the RCA using a Rascon Omni wire.  The IFR was 1.0, indicating nonflow limiting stenosis.    4. Closing: At completion of the procedure the relevant equipment was removed from the body and hemostasis achieved by Perclose    Findings   Hemodynamics:   Aorta: 103/57 mmHg  LVEDP: 18 mmHg  No significant pullback gradient across the aortic valve    Coronary Anatomy   Left Main: Normal   LAD:  70% mid stenosis with competitive filling distally via the LIMA. No significant runoff disease. The first diagonal has 40% stenosis proximally   LCx:  MLI in the AV groove. The OM1 is small with diffuse 40% stenosis. The OM2 has 80% stenosis and fills by the diseased VG     RCA: Dominant, Occluded in the mid portion  the PDA has diffuse 40% stenosis     Grafts   LIMA-LAD: Patent   VG-OM2: 80% proximal stenosis   VG-PDA: Patent with proximal ectasia. iFR 1.0    Results of intervention to the VG-OM2:  Pre: 80% stenosis and VADIM III flow  Post: 0% residual stenosis and VADIM III flow. No dissection or distal embolization.    Technical Factors  1. Complications: None  2. Estimated Blood Loss: <50 cc  3. Specimens: None  4. Contrast Volume: 98 ml  5. Medications: Radial cocktail (Verapamil 2.5 mg, Nitroglycerin 100 mcg) Heparin 2000 units Bivalirudin Clopidogrel  600 mg  6. Radiation (air kerma): 303 mGy

## 2024-08-07 ENCOUNTER — APPOINTMENT (OUTPATIENT)
Dept: MEDICAL GROUP | Facility: MEDICAL CENTER | Age: 69
End: 2024-08-07
Payer: MEDICARE

## 2024-08-07 ENCOUNTER — PRE-ADMISSION TESTING (OUTPATIENT)
Dept: ADMISSIONS | Facility: MEDICAL CENTER | Age: 69
End: 2024-08-07
Attending: INTERNAL MEDICINE
Payer: MEDICARE

## 2024-08-07 VITALS
DIASTOLIC BLOOD PRESSURE: 50 MMHG | BODY MASS INDEX: 28.2 KG/M2 | HEIGHT: 70 IN | SYSTOLIC BLOOD PRESSURE: 108 MMHG | OXYGEN SATURATION: 94 % | HEART RATE: 60 BPM | WEIGHT: 197 LBS | TEMPERATURE: 97 F

## 2024-08-07 DIAGNOSIS — G89.29 CHRONIC PAIN OF RIGHT KNEE: ICD-10-CM

## 2024-08-07 DIAGNOSIS — I25.119 ATHEROSCLEROSIS OF NATIVE CORONARY ARTERY OF NATIVE HEART WITH ANGINA PECTORIS (HCC): Primary | ICD-10-CM

## 2024-08-07 DIAGNOSIS — Z95.5 PRESENCE OF DRUG COATED STENT IN CORONARY ARTERY: ICD-10-CM

## 2024-08-07 DIAGNOSIS — I83.91 ASYMPTOMATIC VARICOSE VEINS OF RIGHT LOWER EXTREMITY: ICD-10-CM

## 2024-08-07 DIAGNOSIS — M25.561 CHRONIC PAIN OF RIGHT KNEE: ICD-10-CM

## 2024-08-07 DIAGNOSIS — E78.5 DYSLIPIDEMIA: ICD-10-CM

## 2024-08-07 DIAGNOSIS — Z95.1 HX OF CABG: ICD-10-CM

## 2024-08-07 PROBLEM — E66.9 OBESITY (BMI 30.0-34.9): Status: RESOLVED | Noted: 2022-08-05 | Resolved: 2024-08-07

## 2024-08-07 PROBLEM — R03.0 ELEVATED BLOOD PRESSURE READING IN OFFICE WITHOUT DIAGNOSIS OF HYPERTENSION: Status: RESOLVED | Noted: 2022-08-05 | Resolved: 2024-08-07

## 2024-08-07 PROBLEM — E66.811 OBESITY (BMI 30.0-34.9): Status: RESOLVED | Noted: 2022-08-05 | Resolved: 2024-08-07

## 2024-08-07 PROCEDURE — 3078F DIAST BP <80 MM HG: CPT | Performed by: STUDENT IN AN ORGANIZED HEALTH CARE EDUCATION/TRAINING PROGRAM

## 2024-08-07 PROCEDURE — 3074F SYST BP LT 130 MM HG: CPT | Performed by: STUDENT IN AN ORGANIZED HEALTH CARE EDUCATION/TRAINING PROGRAM

## 2024-08-07 PROCEDURE — 99214 OFFICE O/P EST MOD 30 MIN: CPT | Performed by: STUDENT IN AN ORGANIZED HEALTH CARE EDUCATION/TRAINING PROGRAM

## 2024-08-07 ASSESSMENT — FIBROSIS 4 INDEX: FIB4 SCORE: 0.94

## 2024-08-07 NOTE — OR NURSING
"Patient scheduled for tele PAT at 3:45 pm for procedure on 8/16/24 with Dr. Bee, but patient is showing up as \"not scheduled\".  Spoke with Malorie from cardiology.  Per Malorie\"  Patient was seen yesterday.  Per Chio in pre-reg: patient is not scheduled for any procedure on 8/16/24.    "

## 2024-08-07 NOTE — PROGRESS NOTES
"Subjective:     CC:   Chief Complaint   Patient presents with    Follow-Up      6 MO FV    Knee Pain     RIGHT KNEE PAIN          HPI:   Joe presents today with  Verbal consent was acquired by the patient to use [employer] ambient listening note generation during this visit Yes   History of Present Illness  The patient presents for evaluation of multiple medical concerns.    He maintains an active lifestyle. In January 2024, he underwent a quadruple bypass surgery. About a month ago, he experienced chest pressure while walking, which led to angiogram and yesterday he had  placement of two stents. Currently on lipitor 80 mg , zetia 10 mg , aspirin 81 mg and palvix 75 mg daily for CAD with stents. He is also on coreg . BP is stable , no dizziness or chest pain.He discontinued ranolazine. He denies experiencing heartburn with his medications.    He is experiencing right knee pain, which he suspects may be due to arthritis. He denies any swelling. The pain has been progressively worsening, with some days being better than others. Last week, after working on a stressful cabin with his neighbor, his knee pain flared up. He does not believe there is a tear in his knee. He has not taken any oTC medications    Supplemental Information  He has had varicose veins for many years. They do not cause any swelling.    SOCIAL HISTORY  He is a retired contractor.       Health Maintenance: Completed    ROS:  ROS    Review of systems unremarkable except for concerns noted by patient or items listed.    Please see HPI for additional ROS.      Objective:     Exam:  /50 (BP Location: Left arm, Patient Position: Sitting, BP Cuff Size: Adult)   Pulse 60   Temp 36.1 °C (97 °F) (Temporal)   Ht 1.778 m (5' 10\")   Wt 89.4 kg (197 lb)   SpO2 94%   BMI 28.27 kg/m²  Body mass index is 28.27 kg/m².    Physical Exam  Constitutional:       General: He is not in acute distress.     Appearance: Normal appearance.   HENT:      Head: " Normocephalic.   Cardiovascular:      Rate and Rhythm: Normal rate and regular rhythm.      Pulses: Normal pulses.      Heart sounds: Normal heart sounds.   Pulmonary:      Effort: Pulmonary effort is normal.      Breath sounds: Normal breath sounds.   Skin:     General: Skin is warm.   Neurological:      Mental Status: He is alert and oriented to person, place, and time.   Psychiatric:         Mood and Affect: Mood normal.         Behavior: Behavior normal.       Groin wound dressing present . No swelling or redness around. Slightly tender.         Labs: reviewed     Assessment & Plan:     68 y.o. male with the following -     1. Atherosclerosis of native coronary artery of native heart with angina pectoris (HCC)  2. Hx of CABG  3. Presence of drug coated stent in coronary artery  Chronic, stable   With coronary artery disease status post CABG 3 vessels in January 2024 with recent reoccurrence of chest pain angiogram done and has 2 stents placed on 8/6/2024.  Plan  Continue following up with cardiology  Recommended to follow instructions and recommendations per cardiology  Continue Lipitor 80 mg, Zetia 10 mg, aspirin 81 mg and Plavix 75 mg daily  Continue carvedilol as prescribed by cardiology        4. Dyslipidemia  Chronic, stable   The 10-year ASCVD risk score (Pendergrass DK, et al., 2019) is: 11.3%  Continue Lipitor 80 mg and Zetia 10 mg   Recommended to continue low fat healthy diet     5. Asymptomatic varicose veins of right lower extremity  Chronic, stable  Asymptomatic at present  Continue to monitor  Recommended compression socks if develop any swelling        6. Chronic pain of right knee  Chronic, gradually worsening  Most likely arthritis /wear-and-tear of knee joint  Patient is a vigorous walker and runner who used to walk 5 to 10 miles daily.  Plan:  A referral to orthopedics was made.  Education and counseling provided and strongly advised to avoid any seeds given he is on dual antiplatelet  treatment.instead take Tylenol 1000 mg three times a day as needed. Topical Bengay was suggested to be used as needed.   - Referral to Orthopedics      Follow-up in 3 to 6 months for annual wellness    Please note that this dictation was created using voice recognition software. I have made every reasonable attempt to correct obvious errors, but I expect that there are errors of grammar and possibly content that I did not discover before finalizing the note.

## 2024-08-08 ENCOUNTER — TELEPHONE (OUTPATIENT)
Dept: CARDIOLOGY | Facility: MEDICAL CENTER | Age: 69
End: 2024-08-08
Payer: MEDICARE

## 2024-08-08 NOTE — LETTER
PROCEDURE/SURGERY CLEARANCE FORM      Encounter Date: 8/8/2024    Patient: Joe Morelos  YOB: 1955    CARDIOLOGIST:  Jessica Angel P.A.-C.    REFERRING DOCTOR:  No ref. provider found    The following procedure/surgery: Removal, Hardware - Left                                            Additional comments: He should not proceed with this procedure unless it is emergent and if it is emergent then he will need to be bridged with heparin as he just had two stents placed two days ago and cannot interrupt DAPT.       Electronically signed       Signature   Jessica Angel P.A.-C.

## 2024-08-08 NOTE — TELEPHONE ENCOUNTER
Per AM - He should not proceed with this procedure unless it is emergent and if it is emergent then he will need to be bridged with heparin as he just had two stents placed two days ago and cannot interrupt DAPT.      Letter sent.

## 2024-08-08 NOTE — TELEPHONE ENCOUNTER
Last OV: 07/30/2024  Proposed Surgery: Removal, Hardware - Left   Surgery Date: TBD   Requesting Office Name: [facility]   Fax Number: [phone]  Preference of Location (default is surgery center unless specified by Cardiologist or [facility])  Prior Clearance Addressed: No      Anticoags/Antiplatelets: Clopidogrel  and Aspirin  Anticoags/Antiplatelet managed by Cardiology? YES    Outstanding Cardiac Imaging : Yes  Other DX-CHEST-2 VIEWS [[id number]]  Clearance to provider to review  Stent, Cardiac Devices, or Catheterization: Yes  Within the last 6 months. Forward to provider to review.  Ablation, TAVR/Valve (including open heart), Cardioversion: No  Recent Cardiac Hospitalization: Yes  Date:  8/6/2024            When: Hospitalized in the last 3 months. Forward to provider to review.   History (cardiac history):   Past Medical History:   Diagnosis Date    BMI 27.0-27.9,adult 07/28/2020    Elevated blood pressure reading in office without diagnosis of hypertension 08/05/2022    Heart burn     High cholesterol     Hypertension     Obesity (BMI 30.0-34.9) 08/05/2022             Surgical Clearance Letter Sent: No Provider to advise.   **Scan clearance request letter into [facility].**

## 2024-08-15 ENCOUNTER — OFFICE VISIT (OUTPATIENT)
Dept: CARDIOLOGY | Facility: MEDICAL CENTER | Age: 69
End: 2024-08-15
Attending: PHYSICIAN ASSISTANT
Payer: MEDICARE

## 2024-08-15 VITALS
RESPIRATION RATE: 16 BRPM | BODY MASS INDEX: 27.8 KG/M2 | WEIGHT: 194.2 LBS | OXYGEN SATURATION: 96 % | HEIGHT: 70 IN | HEART RATE: 77 BPM | SYSTOLIC BLOOD PRESSURE: 108 MMHG | DIASTOLIC BLOOD PRESSURE: 60 MMHG

## 2024-08-15 DIAGNOSIS — I51.9 DYSFUNCTION OF RIGHT CARDIAC VENTRICLE: ICD-10-CM

## 2024-08-15 DIAGNOSIS — I48.91 ATRIAL FIBRILLATION, UNSPECIFIED TYPE (HCC): ICD-10-CM

## 2024-08-15 DIAGNOSIS — I10 ESSENTIAL HYPERTENSION, BENIGN: ICD-10-CM

## 2024-08-15 DIAGNOSIS — Z95.1 S/P CABG X 3: ICD-10-CM

## 2024-08-15 DIAGNOSIS — I20.9 ANGINA PECTORIS (HCC): ICD-10-CM

## 2024-08-15 DIAGNOSIS — Z95.5 S/P DRUG ELUTING CORONARY STENT PLACEMENT: ICD-10-CM

## 2024-08-15 DIAGNOSIS — Z95.1 HX OF CABG: ICD-10-CM

## 2024-08-15 DIAGNOSIS — Z95.1 POSTSURGICAL AORTOCORONARY BYPASS STATUS: ICD-10-CM

## 2024-08-15 DIAGNOSIS — E78.5 DYSLIPIDEMIA: ICD-10-CM

## 2024-08-15 PROCEDURE — 99214 OFFICE O/P EST MOD 30 MIN: CPT | Performed by: PHYSICIAN ASSISTANT

## 2024-08-15 PROCEDURE — 3078F DIAST BP <80 MM HG: CPT | Performed by: PHYSICIAN ASSISTANT

## 2024-08-15 PROCEDURE — 99211 OFF/OP EST MAY X REQ PHY/QHP: CPT | Performed by: PHYSICIAN ASSISTANT

## 2024-08-15 PROCEDURE — 3074F SYST BP LT 130 MM HG: CPT | Performed by: PHYSICIAN ASSISTANT

## 2024-08-15 ASSESSMENT — ENCOUNTER SYMPTOMS
NEUROLOGICAL NEGATIVE: 1
EYES NEGATIVE: 1
BRUISES/BLEEDS EASILY: 0
MYALGIAS: 0
WEAKNESS: 0
BLURRED VISION: 0
MUSCULOSKELETAL NEGATIVE: 1
ABDOMINAL PAIN: 0
SHORTNESS OF BREATH: 0
FEVER: 0
PSYCHIATRIC NEGATIVE: 1
ORTHOPNEA: 0
HEADACHES: 0
COUGH: 0
LOSS OF CONSCIOUSNESS: 0
CHILLS: 0
DOUBLE VISION: 0
CONSTITUTIONAL NEGATIVE: 1
PND: 0
VOMITING: 0
NAUSEA: 0
GASTROINTESTINAL NEGATIVE: 1
CLAUDICATION: 0
PALPITATIONS: 0
DIZZINESS: 0

## 2024-08-15 ASSESSMENT — FIBROSIS 4 INDEX: FIB4 SCORE: 0.94

## 2024-08-15 NOTE — PROGRESS NOTES
Chief Complaint   Patient presents with    Dyslipidemia     NP F/V DX: Dyslipidemia           Subjective     Johnny Morelos is a 68 y.o. male with a history of dyslipidemia, hypertension, prediabetes and obesity who presents today for follow up.       His primary cardiologist is Dr. Eldridge. Last seen 4/12/2024 by me. At that exam, he was doing well other than an occasional chest tightness on inspiration. He was have to walk 5 miles daily at 18.6 minutes per mile. He did have concerns that his pleural effusion may return. He was recommended to repeat a chest xray to ensure stability/resolution of his pleural effusion and to repeat an echocardiogram along with NT-proBNP testing. His amiodarone was discontinued because it was 3 months post surgery without recurrence and clopidogrel was discontinued as well. He was to repeat a lipid panel and to follow up in 6 months. His chest xray showed persistence of a small pleural effusion and he was recommended to trial some lasix. His LVEF was preserved on repeat echo. 7/23/2024 he reached out to the office with concerns of shortness of breath and a fear his effusion was back. He was given ED precautions, recommended to use lasix and to follow up ASAP.    Last seen 7/30/2024 by me.  At that exam, he was extremely worried about his heart and felt as though something was not right.  He did have some chest pressure on exertion that intensified the morning exercised.  He was still able to walk 5 miles, but did feel significant symptoms.  The risk versus benefits of continued medical management versus coronary angiogram were discussed.  He elected to start Ranexa and to proceed with coronary angiogram which was performed on 8/6/2024 and revealed neointimal hyperplasia within the vein graft to the OM 2.  He underwent PCI LISA of the vein graft to OM 2 using 2 overlapping stents.  He was started on clopidogrel and aspirin for at least 6 months.    Today, he reports he is doing very  well.  He did have an episode this weekend where he sneezed and felt chest pain.  He thinks that he may have pulled a muscle, because it was reproducible upon palpation.  He indicates that it is now approximately 1/third the intensity from prior.  When it first occurred, he did have difficulty with inspiration, but this has since resolved.  He started walking again 4 days post angiogram and has now built-up to 4 miles daily.  He notes that his miles are approximately 18 minutes in length.  He continues to enjoy walking and rafting on the [place]. No palpitations. No shortness of breath, dyspnea on exertion, orthopnea or PND. No lower extremity edema. No dizziness or lightheadedness. No syncope or presyncope.      Past Medical History:   Diagnosis Date    BMI 27.0-27.9,adult 07/28/2020    Elevated blood pressure reading in office without diagnosis of hypertension 08/05/2022    Heart burn     High cholesterol     Hypertension     Obesity (BMI 30.0-34.9) 08/05/2022     Past Surgical History:   Procedure Laterality Date    MULTIPLE CORONARY ARTERY BYPASS ENDO VEIN HARVEST  1/18/2024    Procedure: CORONARY ARTERY BYPASS GRAFT X3, ENDOSCOPIC VEIN HARVEST;  Surgeon: Arnaldo Bacon D.O.;  Location: [facility];  Service: Cardiothoracic    ECHOCARDIOGRAM, TRANSESOPHAGEAL, INTRAOPERATIVE  1/18/2024    Procedure: ECHOCARDIOGRAM, TRANSESOPHAGEAL, INTRAOPERATIVE;  Surgeon: Arnaldo Bacon D.O.;  Location: [facility];  Service: Cardiothoracic    ENDARTERECTOMY  1/18/2024    Procedure: ENDARTERECTOMY- RIGHT CORONARY ARTERY;  Surgeon: Arnaldo Bacon D.O.;  Location: [facility];  Service: Cardiothoracic    PB RECONSTRUC TOE DEFORM,SOFT TISSUE Left 12/30/2021    Procedure: LEFT FOOT HALLUX VALGUS CORRECTION, LEFT AKIN OSTEOTOMY,;  Surgeon: Ollie Kovacs M.D.;  Location: [facility];  Service: Orthopedics    KY COR HLX VLGS PRX PHLX OSTEOT Left 12/30/2021     Procedure: LEFT DISTAL SOFT TISSUE PROCEDURE;  Surgeon: Ollie Kovacs M.D.;  Location: [facility];  Service: Orthopedics    PB OSTEOTOMY METATARSALS,MULTIPLE Left 12/30/2021    Procedure: LEFT SECOND, THIRD DISTAL METATARSAL OSTEOTOMY;  Surgeon: Ollie Kovacs M.D.;  Location: [facility];  Service: Orthopedics    PB PART EXCIS 5TH METATARSAL HEAD Left 12/30/2021    Procedure: LEFT SECOND, THIRD METATARSOPHALANGEAL JOINT RECONSTRUCTION;  Surgeon: Ollie Kovacs M.D.;  Location: [facility];  Service: Orthopedics    PB REPAIR OF HAMMERTOE,ONE Left 12/30/2021    Procedure: LEFT SECOND AND THIRD HAMMERTOE CORRECTION;  Surgeon: Ollie Kovacs M.D.;  Location: [facility];  Service: Orthopedics    PB EXCIS INTERDIGITAL NEUROMA,EA Left 12/30/2021    Procedure: LEFT SECOND WEBSPACE NEUROMA EXCISION;  Surgeon: Ollie Kovacs M.D.;  Location: [facility];  Service: Orthopedics     Family History   Problem Relation Age of Onset    Heart Disease Mother         CHF    Heart Disease Father         bypass age 49, 75     Social History     Socioeconomic History    Marital status: Single     Spouse name: Not on file    Number of children: Not on file    Years of education: Not on file    Highest education level: Not on file   Occupational History    Not on file   Tobacco Use    Smoking status: Never    Smokeless tobacco: Never   Vaping Use    Vaping status: Never Used   Substance and Sexual Activity    Alcohol use: Yes     Alcohol/week: 0.6 oz     Types: 1 Shots of liquor per week     Comment: once a month    Drug use: Not Currently    Sexual activity: Not on file   Other Topics Concern    Not on file   Social History Narrative    Not on file     Social Determinants of Health     Financial Resource Strain: Not on file   Food Insecurity: Not on file   Transportation Needs: Not on file   Physical Activity: Not on file  "  Stress: Not on file   Social Connections: Not on file   Intimate Partner Violence: Not on file   Housing Stability: Not on file     No Known Allergies  Outpatient Encounter Medications as of 8/15/2024   Medication Sig Dispense Refill    clopidogrel (PLAVIX) 75 MG Tab Take 1 Tablet by mouth every day. 100 Tablet 3    atorvastatin (LIPITOR) 80 MG tablet Take 80 mg by mouth every evening.      ezetimibe (ZETIA) 10 MG Tab Take 10 mg by mouth every day.      carvedilol (COREG) 3.125 MG Tab Take 1 Tablet by mouth 2 times a day with meals. 200 Tablet 3    aspirin 81 MG EC tablet Take 1 Tablet by mouth every day. 100 Tablet 2     No facility-administered encounter medications on file as of 8/15/2024.     Review of Systems   Constitutional: Negative.  Negative for chills, fever and malaise/fatigue.   HENT: Negative.     Eyes: Negative.  Negative for blurred vision and double vision.   Respiratory:  Negative for cough and shortness of breath.    Cardiovascular:  Positive for chest pain. Negative for palpitations, orthopnea, claudication, leg swelling and PND.   Gastrointestinal: Negative.  Negative for abdominal pain, nausea and vomiting.   Genitourinary: Negative.    Musculoskeletal: Negative.  Negative for myalgias.   Skin: Negative.  Negative for rash.   Neurological: Negative.  Negative for dizziness, loss of consciousness, weakness and headaches.   Endo/Heme/Allergies: Negative.  Does not bruise/bleed easily.   Psychiatric/Behavioral: Negative.                Objective     /60 (BP Location: Left arm, Patient Position: Sitting, BP Cuff Size: Adult)   Pulse 77   Resp 16   Ht 1.778 m (5' 10\")   Wt 88.1 kg (194 lb 3.2 oz)   SpO2 96%   BMI 27.86 kg/m²     Physical Exam  Vitals reviewed.   Constitutional:       General: He is not in acute distress.     Appearance: Normal appearance.   HENT:      Head: Normocephalic and atraumatic.      Right Ear: External ear normal.      Left Ear: External ear normal.   Eyes:    "   General: No scleral icterus.     Extraocular Movements: Extraocular movements intact.      Conjunctiva/sclera: Conjunctivae normal.      Pupils: Pupils are equal, round, and reactive to light.   Cardiovascular:      Rate and Rhythm: Normal rate and regular rhythm.      Pulses: Normal pulses.      Heart sounds: Normal heart sounds. No murmur heard.     No friction rub. No gallop.      Comments: Reproducible chest pain upon deep palpation  Pulmonary:      Effort: Pulmonary effort is normal.      Breath sounds: Normal breath sounds.   Abdominal:      General: Bowel sounds are normal.      Palpations: Abdomen is soft.      Tenderness: There is no abdominal tenderness.   Musculoskeletal:         General: Normal range of motion.      Cervical back: Normal range of motion and neck supple.      Right lower leg: No edema.      Left lower leg: No edema.   Skin:     General: Skin is warm and dry.      Capillary Refill: Capillary refill takes less than 2 seconds.   Neurological:      General: No focal deficit present.      Mental Status: He is alert and oriented to person, place, and time.   Psychiatric:         Mood and Affect: Mood normal.         Behavior: Behavior normal.         Judgment: Judgment normal.       Lab Results   Component Value Date/Time    CHOLSTRLTOT 134 06/11/2024 06:04 AM    LDL 73 06/11/2024 06:04 AM    HDL 47 06/11/2024 06:04 AM    TRIGLYCERIDE 68 06/11/2024 06:04 AM       Lab Results   Component Value Date/Time    SODIUM 137 08/06/2024 07:02 AM    POTASSIUM 4.3 08/06/2024 07:02 AM    CHLORIDE 103 08/06/2024 07:02 AM    CO2 21 08/06/2024 07:02 AM    GLUCOSE 112 (H) 08/06/2024 07:02 AM    BUN 17 08/06/2024 07:02 AM    CREATININE 0.76 08/06/2024 07:02 AM     Lab Results   Component Value Date/Time    ALKPHOSPHAT 74 08/06/2024 07:02 AM    ASTSGOT 18 08/06/2024 07:02 AM    ALTSGPT 24 08/06/2024 07:02 AM    TBILIRUBIN 0.9 08/06/2024 07:02 AM          EKG 7/30/2024 personally interpreted by me as sinus  rhythm with RSR'  NM stress test 10/19/2023  NUCLEAR IMAGING INTERPRETATION   Questionable moderate sized reversible perfusion defect of the inferior wall.      SDS would be 3.   No focal wall motion abnormality seen.   LVEF 57%.   ECG INTERPRETATION   ST depression 1mm horizontal lateral leads at peak stress.     Echocardiogram 11/1/2023  CONCLUSIONS  Normal left ventricular systolic function. The ejection fraction is   measured to be 61 % by Reed's biplane.  Normal right ventricular size and systolic function.  Aortic sclerosis without stenosis. Mild aortic insufficiency.  Unable to estimate right ventricular systolic pressure due to an   inadequate tricuspid regurgitant jet.  Mild dilatation of ascending aorta 4.3cm.  No prior study is available for comparison.     [facility] 12/21/2023  HEMODYNAMICS:   Aortic pressure: 120 /70 mmHg  LVEDP: 18 mmHg  No significant aortic gradient on pullback  CORONARY ANGIOGRAPHY:  The left main coronary artery : Large-caliber vessel with mild luminal irregularities, bifurcates to LAD and left circumflex  The left anterior descending coronary artery : Large-caliber transapical vessel with calcified proximal/midportion with diffuse disease throughout the proximal/mid LAD at 70%, iFR 0.75 suggestive of hemodynamically significant stenosis.  Left-to-right collaterals  The left circumflex coronary artery : Large-caliber vessel that gives rise to medium OM1 with diffuse nonobstructive CAD, large OM 2 that has severe 80% proximal stenosis before its bifurcation.  Left-to-right collaterals.  The right coronary artery  : Large-caliber dominant vessel with  of its midportion fills via left-to-right collaterals  INSTANTANEOUS WAVE FREE RATIO:  IFR of the proximal/mid left anterior descending coronary artery was 0.75: suggestive of a hemodynamically significant stenosis.  IMPRESSION:  Severe three-vessel CAD (mid RCA , mid cx-OM and prox/mid LAD IFR 0.75)  Mildly elevated resting LVEDP 18  mmHg with no significant transaortic gradient on pullback  RECOMMENDATIONS:  CT surgery have been consulted, they will arrange for outpatient consultation to discuss CABG as a revascularization option for this patient  Ongoing optimal secondary ASCVD prevention  TR band protocol     Echocardiogram 4/17/2024  CONCLUSIONS  Compared to the images of the prior study on 11/01/2023 - the right   ventricular function is now reduced.  The ascending aorta is not well   seen on the current exam, previously dilated at 4.3 cm.  Normal left ventricular systolic function.  The left ventricular ejection fraction is visually estimated to be 65%.  Normal diastolic function.  Reduced right ventricular systolic function.  Mild aortic insufficiency.       [facility] 8/6/2024  IMPRESSIONS:  1.  Angina with unsatisfactory control despite medical therapy due to neointimal hyperplasia within the vein graft to the OM 2  2.  Successful PCI of the vein graft to OM 2 using 2 overlapping drug-eluting stents, IVUS  3.  Nonflow limiting stenosis in the vein graft to the RCA  4.  Obstructive three-vessel coronary artery disease with patent bypass grafts  5.  Mild elevation LVEDP at 18 mmHg     Recommendations:  Dual platelet therapy for 6 months     Pre-procedure diagnosis:  Recurrent angina following bypass  Post-procedure diagnosis: Same     Procedure performed  Selective coronary angiography  Left heart catheterization  Bypass graft angiography  Percutaneous coronary intervention - Stent Placement (VG to OM 2)  Intravascular Ultrasound (VG to OM 2)  Instantaneous wave free ratio (iFR) (VG to RCA)  Findings   Hemodynamics:   Aorta: 103/57 mmHg  LVEDP: 18 mmHg  No significant pullback gradient across the aortic valve     Coronary Anatomy              Left Main: Normal              LAD:  70% mid stenosis with competitive filling distally via the LIMA. No significant runoff disease. The first diagonal has 40% stenosis proximally              LCx:  MLI in  the AV groove. The OM1 is small with diffuse 40% stenosis. The OM2 has 80% stenosis and fills by the diseased VG                RCA: Dominant, Occluded in the mid portion  the PDA has diffuse 40% stenosis                Grafts              LIMA-LAD: Patent              VG-OM2: 80% proximal stenosis              VG-PDA: Patent with proximal ectasia. iFR 1.0     Results of intervention to the VG-OM2:  Pre: 80% stenosis and VADIM III flow  Post: 0% residual stenosis and VADIM III flow. No dissection or distal embolization.         Assessment & Plan     1. Hx of CABG        2. S/P CABG x 3        3. Atrial fibrillation, unspecified type ([facility])        4. Dyslipidemia        5. Postsurgical aortocoronary bypass status        6. Angina pectoris ([facility])        7. Essential hypertension, benign        8. Dysfunction of right cardiac ventricle        9. S/P drug eluting coronary stent placement            Medical Decision Making: Today's Assessment/Status/Plan:        CAD s/p CABG x3 (LIMA to LAD, RSVG to OM1, RSVG to PDA) 1/18/2024  RV dysfunction  -No further angina.  -S/p PCI LISA X2 to the OM/VG  - Continue carvedilol 3.125mg BID.   - He was not interested in cardiac rehab.   - Continue atorvastatin and zetia.  -Continue DAPT with aspirin and Plavix for at least the next 6 months.  Discussed pending ankle surgery with Dr. Morris who recommends he proceed with DAPT uninterrupted for period of 6 months which would be ideal.  If he stops DAPT prior to 6 months, he will be at risk for in-stent thrombosis.  -He would like to trial stopping Ranexa.  Discussed that this is reasonable given his recent revascularization.  If he has recurrence of angina, recommended he reach out to the office at which point we can consider reinitiating Ranexa.     Hypertension  - Blood pressure is well controlled  - Continue at home BP monitoring.  - Continue carvedilol 3.125mg BID.      Post op afib  - Asymptomatic  - Regular rate and rhythm on exam  today.  - He completed 3 months of amiodarone therapy.   - No evidence of recurrence.    - Anticoagulation not currently indicated given no prior history and elicited post op after CABG.  - Continue coreg.     Dyslipidemia  -LDL 73; goal <70  - Recently started on zetia. Repeat lipid panel pending for September.   - Continue atorvastatin 80mg daily and zetia 10mg daily.   - Continue diet and lifestyle modifications.      Follow up in 3 months or sooner with clinical changes.     Encouraged him to reach out via MyChart or telephone with any questions or concerns.     Thank you for allowing me to participate in the care of Joe Morelos .    Jessica Angel PA-C, Cardiology  [facility]  [address] [address]  Phone: [phone]  Fax: [phone]     I personally spent a total of 45 minutes which includes face-to-face time and non-face-to-face time spent on preparing to see the patient, reviewing hospital notes and tests, obtaining history from the patient, performing a medically appropriate exam, counseling and educating the patient, ordering medications/tests/procedures/referrals as clinically indicated, and documenting information in the electronic medical record.     I personally spent a total of 36 minutes which includes face-to-face time and non-face-to-face time spent on preparing to see the patient, reviewing hospital notes and tests, obtaining history from the patient, performing a medically appropriate exam, counseling and educating the patient, ordering medications/tests/procedures/referrals as clinically indicated, and documenting information in the electronic medical record.

## 2024-08-19 ENCOUNTER — TELEPHONE (OUTPATIENT)
Dept: CARDIOLOGY | Facility: MEDICAL CENTER | Age: 69
End: 2024-08-19
Payer: MEDICARE

## 2024-08-19 NOTE — TELEPHONE ENCOUNTER
PUMP LINE CALL  AM       PT STATED THAT HE SPOKE TO HIS DOCTOR ANNE-MARIE MCCARTHY IF IT WAS OKAY FOR HIM TO HAVE HIS FOOT SCREW REMOVE, HE CLEARED HIM AND WANTED TO LET ASHLEIGH NGUYEN KNOW THAT HE CAN CONTACT HIM FOR ANY OTHER QUESTIONS, SO SHE CAN SIGN HIS SURGICAL CLEARANCE.

## 2024-08-19 NOTE — TELEPHONE ENCOUNTER
Phone Number Called:  [phone]     Call outcome: Did not leave a detailed message. Requested patient to call back.    Message: Called to inform patient that clearance letter has been sent for hardware removal. Left VM for patient to call back when able.

## 2024-08-19 NOTE — TELEPHONE ENCOUNTER
Expand All Collapse All  Last OV: 8.15.2024  Proposed Surgery: Removal Hardware -left    Surgery Date: TBD  Requesting Office Name: [place] [facility]  Fax Number: [phone]  Preference of Location (default is surgery center unless specified by Cardiologist or [facility])  Prior Clearance Addressed: No        Anticoags/Antiplatelets: Clopidogrel  and Aspirin  Anticoags/Antiplatelet managed by Cardiology? YES    Outstanding Cardiac Imaging : Yes  Other DX-CHEST 2  Clearance to provider to review  Stent, Cardiac Devices, or Catheterization: Yes  Date : 08.06.2024   Ablation, TAVR/Valve (including open heart), Cardioversion: No  Recent Cardiac Hospitalization: No            When: Greater than 3 months since hospitalization   History (cardiac history):   Past Medical History        Past Medical History:   Diagnosis Date    BMI 27.0-27.9,adult 07/28/2020    Elevated blood pressure reading in office without diagnosis of hypertension 08/05/2022    Heart burn      High cholesterol      Hypertension      Obesity (BMI 30.0-34.9) 08/05/2022                     Surgical Clearance Letter Sent: YES   **Scan clearance request letter into [facility].**

## 2024-08-19 NOTE — TELEPHONE ENCOUNTER
Last OV: 8.15.2024  Proposed Surgery: Removal Hardware -left    Surgery Date: TBD  Requesting Office Name: [facility]  Fax Number: [phone]  Preference of Location (default is surgery center unless specified by Cardiologist or [facility])  Prior Clearance Addressed: No      Anticoags/Antiplatelets: Clopidogrel  and Aspirin  Anticoags/Antiplatelet managed by Cardiology? YES    Outstanding Cardiac Imaging : Yes  Other DX-CHEST 2  Clearance to provider to review  Stent, Cardiac Devices, or Catheterization: Yes  Date : 08.06.2024   Ablation, TAVR/Valve (including open heart), Cardioversion: No  Recent Cardiac Hospitalization: No            When: Greater than 3 months since hospitalization   History (cardiac history):   Past Medical History:   Diagnosis Date    BMI 27.0-27.9,adult 07/28/2020    Elevated blood pressure reading in office without diagnosis of hypertension 08/05/2022    Heart burn     High cholesterol     Hypertension     Obesity (BMI 30.0-34.9) 08/05/2022             Surgical Clearance Letter Sent: YES   **Scan clearance request letter into [facility].**

## 2024-08-19 NOTE — LETTER
PROCEDURE/SURGERY CLEARANCE FORM      Encounter Date: 8/19/2024    Patient: Joe Morelos  YOB: 1955    CARDIOLOGIST:  Jessica Angel P.A.-C.    REFERRING DOCTOR:  No ref. provider found    the following procedure/surgery: Removal Hardware -left                                            PROCEDURE/SURGERY CLEARANCE FORM    Date: 8/19/2024   Patient Name: Joe Morelos    Dear Surgeon or Proceduralist,      Thank you for your request for cardiac stratification of our mutual patient Joe Morelos 1955. We have reviewed their [facility] records; and to the best of our understanding this patient has not had stenting, ablation, cardiothoracic surgery or hospitalization for cardiovascular reasons in the past 6 months.  Joe Morelos has been seen within the past 18 months and is considered to have non-modifiable cardiac risk for this low-risk procedure/surgery. They may proceed from a cardiovascular standpoint and may hold their antiplatelet/anticoagulation as briefly as possible. Please have patient resume this medication when hemodynamically stable to do so.     Aspirin or Prasugrel   - hold 7 days prior to procedure/surgery, resume when hemodynamically stable      Clopidrogrel or Ticagrelor  - hold 7 days for all neurological procedures, hold 5 days prior to all other procedure/surgery,  resume when hemodynamically stable     Warfarin - hold 7 days for all neurological procedures, hold 5 days prior to all other procedure/surgery and coordinate with [facility] Anticoagulation Clinic ([phone]) INR testing and dose management.      Pradaxa/Xarelto/Eliquis/Savesya - hold 1 day prior to procedure for low bleeding risk procedure, 2 days for high bleeding risk procedure, or consider holding 3 days or longer for patients with reduced kidney function (CrCl <30mL/min) or spinal/cranial surgeries/procedures.      If they have a mechanical heart valve, please coordinate with [facility]  Anticoagulation Service ([phone]) the proper management of their anticoagulant in the periprocedural or perioperative period.      Some patients have higher risk for cardiovascular complications or holding medication. If our patient has had prior complications of holding antiplatelet or anticoagulants in the past and we have seen them after these events, we have addressed these concerns with the patient. They are at an unknown degree of increased risk for recurrent complication.  You may hold anticoagulation/antiplatelets for the procedure or surgery if the benefits of the procedure or surgery outweigh this nonmodifiable risk.      If Joe Morelos 1955 has new symptoms of heart failure decompensation, unstable arrythmia, or angina please reach out and we will assess the patient.      If you have other patient-specific concerns, please feel free to reach out to the patient's cardiologist directly at [phone].     Thank you,       [facility]         electronic signature        MD Signature   Jessica Angel P.A.-C.

## 2024-09-24 ENCOUNTER — HOSPITAL ENCOUNTER (OUTPATIENT)
Dept: LAB | Facility: MEDICAL CENTER | Age: 69
End: 2024-09-24
Attending: PHYSICIAN ASSISTANT
Payer: MEDICARE

## 2024-09-24 DIAGNOSIS — E78.5 DYSLIPIDEMIA: ICD-10-CM

## 2024-09-24 PROCEDURE — 80061 LIPID PANEL: CPT

## 2024-09-24 PROCEDURE — 36415 COLL VENOUS BLD VENIPUNCTURE: CPT

## 2024-09-25 LAB
CHOLEST SERPL-MCNC: 123 MG/DL (ref 100–199)
HDLC SERPL-MCNC: 45 MG/DL
LDLC SERPL CALC-MCNC: 59 MG/DL
TRIGL SERPL-MCNC: 96 MG/DL (ref 0–149)

## 2024-10-07 ENCOUNTER — APPOINTMENT (OUTPATIENT)
Dept: ADMISSIONS | Facility: MEDICAL CENTER | Age: 69
End: 2024-10-07
Attending: ORTHOPAEDIC SURGERY
Payer: MEDICARE

## 2024-10-10 ENCOUNTER — PRE-ADMISSION TESTING (OUTPATIENT)
Dept: ADMISSIONS | Facility: MEDICAL CENTER | Age: 69
End: 2024-10-10
Attending: ORTHOPAEDIC SURGERY
Payer: MEDICARE

## 2024-10-10 NOTE — OR NURSING
Preadmit: Telephone preadmit done with patient scheduled for upcoming procedure on 11/12/2024 with Dr. Kovacs. Pre-procedure instructions, fasting guidelines, check in location, and medication instructions reviewed with patient. Patient aware to hold any vitamins, supplements, aspirin, aleve and ibuprofen between now and surgery day. Patient verbalized understanding of all instructions. Copy of medication instructions and preadmit instructions sent to patient via regular mail per patient request.  Patient will find a ride, not sure who yet.

## 2024-10-15 ENCOUNTER — TELEPHONE (OUTPATIENT)
Dept: CARDIOLOGY | Facility: MEDICAL CENTER | Age: 69
End: 2024-10-15
Payer: MEDICARE

## 2024-10-28 ENCOUNTER — TELEPHONE (OUTPATIENT)
Dept: CARDIOLOGY | Facility: MEDICAL CENTER | Age: 69
End: 2024-10-28
Payer: MEDICARE

## 2024-10-31 ENCOUNTER — TELEPHONE (OUTPATIENT)
Dept: CARDIOLOGY | Facility: MEDICAL CENTER | Age: 69
End: 2024-10-31
Payer: MEDICARE

## 2024-11-06 ENCOUNTER — TELEPHONE (OUTPATIENT)
Dept: CARDIOLOGY | Facility: MEDICAL CENTER | Age: 69
End: 2024-11-06
Payer: MEDICARE

## 2024-11-06 NOTE — TELEPHONE ENCOUNTER
Phone number called: [phone] (home)      Call outcome: . Tried to call the pt but there was no answer. Left detailed VM

## 2024-11-06 NOTE — TELEPHONE ENCOUNTER
----- Message from Physician Assistant Jessica Angel P.A.-C. sent at 11/6/2024  1:45 PM PST -----  Please call him to tell him the good news! I spoke to the doctor doing his surgery and he can continue DAPT through surgery. Wish him the best of luck.  ----- Message -----  From: Ollie Kovacs M.D.  Sent: 11/5/2024  11:11 AM PST  To: BILLIE Escobar,  He really does not need to stop that medication for this surgery.  Very small incisions and very little blood loss.  No problem keeping it going.  Thank you!    SW  ----- Message -----  From: Jessica Angel P.A.-C.  Sent: 11/5/2024  10:36 AM PST  To: Ollie Kovacs M.D.    I just want you to know that this patient was specifically instructed that he is not to hold DAPT for at least 6 months due to potential instent restenosis. I told him he can proceed as long as you are ok doing it on DAPT. He is not happy with that answer and may end up stopping it on his own accord, but I just wanted to make you aware.

## 2024-11-11 ENCOUNTER — ANESTHESIA EVENT (OUTPATIENT)
Dept: SURGERY | Facility: MEDICAL CENTER | Age: 69
End: 2024-11-11
Payer: MEDICARE

## 2024-11-11 NOTE — OR NURSING
Preadmit: Call to patient to encourage increased oral fluid intake the day prior to procedure/surgery including intake of electrolyte drinks such as Gatorade or electrolyte water. Patient may have clear liquids until 2 hours prior to surgery, unless otherwise directed by their surgeon.    Surgery date 11/12/24

## 2024-11-12 ENCOUNTER — APPOINTMENT (OUTPATIENT)
Dept: RADIOLOGY | Facility: MEDICAL CENTER | Age: 69
End: 2024-11-12
Attending: ORTHOPAEDIC SURGERY
Payer: MEDICARE

## 2024-11-12 ENCOUNTER — HOSPITAL ENCOUNTER (OUTPATIENT)
Facility: MEDICAL CENTER | Age: 69
End: 2024-11-12
Attending: ORTHOPAEDIC SURGERY | Admitting: ORTHOPAEDIC SURGERY
Payer: MEDICARE

## 2024-11-12 ENCOUNTER — ANESTHESIA (OUTPATIENT)
Dept: SURGERY | Facility: MEDICAL CENTER | Age: 69
End: 2024-11-12
Payer: MEDICARE

## 2024-11-12 VITALS
WEIGHT: 201.06 LBS | SYSTOLIC BLOOD PRESSURE: 127 MMHG | HEART RATE: 62 BPM | BODY MASS INDEX: 28.78 KG/M2 | OXYGEN SATURATION: 92 % | DIASTOLIC BLOOD PRESSURE: 71 MMHG | TEMPERATURE: 97.1 F | RESPIRATION RATE: 13 BRPM | HEIGHT: 70 IN

## 2024-11-12 LAB
ALBUMIN SERPL BCP-MCNC: 4.5 G/DL (ref 3.2–4.9)
ALBUMIN/GLOB SERPL: 1.3 G/DL
ALP SERPL-CCNC: 78 U/L (ref 30–99)
ALT SERPL-CCNC: 36 U/L (ref 2–50)
ANION GAP SERPL CALC-SCNC: 12 MMOL/L (ref 7–16)
APTT PPP: 28.1 SEC (ref 24.7–36)
AST SERPL-CCNC: 33 U/L (ref 12–45)
BILIRUB SERPL-MCNC: 0.7 MG/DL (ref 0.1–1.5)
BUN SERPL-MCNC: 13 MG/DL (ref 8–22)
CALCIUM ALBUM COR SERPL-MCNC: 9.1 MG/DL (ref 8.5–10.5)
CALCIUM SERPL-MCNC: 9.5 MG/DL (ref 8.5–10.5)
CHLORIDE SERPL-SCNC: 104 MMOL/L (ref 96–112)
CO2 SERPL-SCNC: 21 MMOL/L (ref 20–33)
CREAT SERPL-MCNC: 0.83 MG/DL (ref 0.5–1.4)
ERYTHROCYTE [DISTWIDTH] IN BLOOD BY AUTOMATED COUNT: 41.6 FL (ref 35.9–50)
GFR SERPLBLD CREATININE-BSD FMLA CKD-EPI: 95 ML/MIN/1.73 M 2
GLOBULIN SER CALC-MCNC: 3.4 G/DL (ref 1.9–3.5)
GLUCOSE SERPL-MCNC: 102 MG/DL (ref 65–99)
HCT VFR BLD AUTO: 47.7 % (ref 42–52)
HGB BLD-MCNC: 16.3 G/DL (ref 14–18)
MCH RBC QN AUTO: 30.4 PG (ref 27–33)
MCHC RBC AUTO-ENTMCNC: 34.2 G/DL (ref 32.3–36.5)
MCV RBC AUTO: 88.8 FL (ref 81.4–97.8)
PLATELET # BLD AUTO: 301 K/UL (ref 164–446)
PMV BLD AUTO: 8.9 FL (ref 9–12.9)
POTASSIUM SERPL-SCNC: 4.1 MMOL/L (ref 3.6–5.5)
PROT SERPL-MCNC: 7.9 G/DL (ref 6–8.2)
RBC # BLD AUTO: 5.37 M/UL (ref 4.7–6.1)
SODIUM SERPL-SCNC: 137 MMOL/L (ref 135–145)
WBC # BLD AUTO: 5.9 K/UL (ref 4.8–10.8)

## 2024-11-12 PROCEDURE — 85027 COMPLETE CBC AUTOMATED: CPT

## 2024-11-12 PROCEDURE — 160039 HCHG SURGERY MINUTES - EA ADDL 1 MIN LEVEL 3: Performed by: ORTHOPAEDIC SURGERY

## 2024-11-12 PROCEDURE — 85730 THROMBOPLASTIN TIME PARTIAL: CPT

## 2024-11-12 PROCEDURE — 502240 HCHG MISC OR SUPPLY RC 0272: Performed by: ORTHOPAEDIC SURGERY

## 2024-11-12 PROCEDURE — 160046 HCHG PACU - 1ST 60 MINS PHASE II: Performed by: ORTHOPAEDIC SURGERY

## 2024-11-12 PROCEDURE — 700111 HCHG RX REV CODE 636 W/ 250 OVERRIDE (IP): Performed by: ORTHOPAEDIC SURGERY

## 2024-11-12 PROCEDURE — 160048 HCHG OR STATISTICAL LEVEL 1-5: Performed by: ORTHOPAEDIC SURGERY

## 2024-11-12 PROCEDURE — 700111 HCHG RX REV CODE 636 W/ 250 OVERRIDE (IP): Mod: JG | Performed by: ORTHOPAEDIC SURGERY

## 2024-11-12 PROCEDURE — 36415 COLL VENOUS BLD VENIPUNCTURE: CPT

## 2024-11-12 PROCEDURE — 160025 RECOVERY II MINUTES (STATS): Performed by: ORTHOPAEDIC SURGERY

## 2024-11-12 PROCEDURE — 160009 HCHG ANES TIME/MIN: Performed by: ORTHOPAEDIC SURGERY

## 2024-11-12 PROCEDURE — 28090 REMOVAL OF FOOT LESION: CPT | Mod: ASROC,LT | Performed by: NURSE PRACTITIONER

## 2024-11-12 PROCEDURE — 160028 HCHG SURGERY MINUTES - 1ST 30 MINS LEVEL 3: Performed by: ORTHOPAEDIC SURGERY

## 2024-11-12 PROCEDURE — 700111 HCHG RX REV CODE 636 W/ 250 OVERRIDE (IP): Mod: JZ | Performed by: ANESTHESIOLOGY

## 2024-11-12 PROCEDURE — C1713 ANCHOR/SCREW BN/BN,TIS/BN: HCPCS | Performed by: ORTHOPAEDIC SURGERY

## 2024-11-12 PROCEDURE — 700111 HCHG RX REV CODE 636 W/ 250 OVERRIDE (IP): Performed by: ANESTHESIOLOGY

## 2024-11-12 PROCEDURE — 20680 REMOVAL OF IMPLANT DEEP: CPT | Mod: ASROC | Performed by: NURSE PRACTITIONER

## 2024-11-12 PROCEDURE — 700102 HCHG RX REV CODE 250 W/ 637 OVERRIDE(OP): Performed by: ANESTHESIOLOGY

## 2024-11-12 PROCEDURE — 73620 X-RAY EXAM OF FOOT: CPT | Mod: LT

## 2024-11-12 PROCEDURE — 28090 REMOVAL OF FOOT LESION: CPT | Mod: LT | Performed by: ORTHOPAEDIC SURGERY

## 2024-11-12 PROCEDURE — 160002 HCHG RECOVERY MINUTES (STAT): Performed by: ORTHOPAEDIC SURGERY

## 2024-11-12 PROCEDURE — 160035 HCHG PACU - 1ST 60 MINS PHASE I: Performed by: ORTHOPAEDIC SURGERY

## 2024-11-12 PROCEDURE — 700101 HCHG RX REV CODE 250: Performed by: ANESTHESIOLOGY

## 2024-11-12 PROCEDURE — A9270 NON-COVERED ITEM OR SERVICE: HCPCS | Performed by: ANESTHESIOLOGY

## 2024-11-12 PROCEDURE — 80053 COMPREHEN METABOLIC PANEL: CPT

## 2024-11-12 PROCEDURE — 700105 HCHG RX REV CODE 258: Performed by: ANESTHESIOLOGY

## 2024-11-12 PROCEDURE — 20680 REMOVAL OF IMPLANT DEEP: CPT | Performed by: ORTHOPAEDIC SURGERY

## 2024-11-12 RX ORDER — ONDANSETRON 2 MG/ML
INJECTION INTRAMUSCULAR; INTRAVENOUS PRN
Status: DISCONTINUED | OUTPATIENT
Start: 2024-11-12 | End: 2024-11-12 | Stop reason: SURG

## 2024-11-12 RX ORDER — BUPIVACAINE HYDROCHLORIDE 5 MG/ML
INJECTION, SOLUTION EPIDURAL; INTRACAUDAL
Status: DISCONTINUED | OUTPATIENT
Start: 2024-11-12 | End: 2024-11-12 | Stop reason: HOSPADM

## 2024-11-12 RX ORDER — ALBUTEROL SULFATE 5 MG/ML
2.5 SOLUTION RESPIRATORY (INHALATION)
Status: DISCONTINUED | OUTPATIENT
Start: 2024-11-12 | End: 2024-11-12 | Stop reason: HOSPADM

## 2024-11-12 RX ORDER — HALOPERIDOL 5 MG/ML
1 INJECTION INTRAMUSCULAR
Status: DISCONTINUED | OUTPATIENT
Start: 2024-11-12 | End: 2024-11-12 | Stop reason: HOSPADM

## 2024-11-12 RX ORDER — DEXAMETHASONE SODIUM PHOSPHATE 4 MG/ML
INJECTION, SOLUTION INTRA-ARTICULAR; INTRALESIONAL; INTRAMUSCULAR; INTRAVENOUS; SOFT TISSUE PRN
Status: DISCONTINUED | OUTPATIENT
Start: 2024-11-12 | End: 2024-11-12 | Stop reason: SURG

## 2024-11-12 RX ORDER — BUPIVACAINE HYDROCHLORIDE 5 MG/ML
INJECTION, SOLUTION EPIDURAL; INTRACAUDAL
Status: DISCONTINUED
Start: 2024-11-12 | End: 2024-11-12 | Stop reason: HOSPADM

## 2024-11-12 RX ORDER — MEPERIDINE HYDROCHLORIDE 25 MG/ML
6.25 INJECTION INTRAMUSCULAR; INTRAVENOUS; SUBCUTANEOUS
Status: DISCONTINUED | OUTPATIENT
Start: 2024-11-12 | End: 2024-11-12 | Stop reason: HOSPADM

## 2024-11-12 RX ORDER — OXYCODONE HCL 5 MG/5 ML
5 SOLUTION, ORAL ORAL
Status: COMPLETED | OUTPATIENT
Start: 2024-11-12 | End: 2024-11-12

## 2024-11-12 RX ORDER — ACETAMINOPHEN 500 MG
1000 TABLET ORAL ONCE
Status: DISCONTINUED | OUTPATIENT
Start: 2024-11-12 | End: 2024-11-12 | Stop reason: HOSPADM

## 2024-11-12 RX ORDER — OXYCODONE HCL 5 MG/5 ML
10 SOLUTION, ORAL ORAL
Status: COMPLETED | OUTPATIENT
Start: 2024-11-12 | End: 2024-11-12

## 2024-11-12 RX ORDER — CEFAZOLIN SODIUM 1 G/3ML
2 INJECTION, POWDER, FOR SOLUTION INTRAMUSCULAR; INTRAVENOUS ONCE
Status: COMPLETED | OUTPATIENT
Start: 2024-11-12 | End: 2024-11-12

## 2024-11-12 RX ORDER — PHENYLEPHRINE HCL IN 0.9% NACL 1 MG/10 ML
SYRINGE (ML) INTRAVENOUS PRN
Status: DISCONTINUED | OUTPATIENT
Start: 2024-11-12 | End: 2024-11-12 | Stop reason: SURG

## 2024-11-12 RX ORDER — LABETALOL HYDROCHLORIDE 5 MG/ML
5 INJECTION, SOLUTION INTRAVENOUS
Status: DISCONTINUED | OUTPATIENT
Start: 2024-11-12 | End: 2024-11-12 | Stop reason: HOSPADM

## 2024-11-12 RX ORDER — ONDANSETRON 2 MG/ML
4 INJECTION INTRAMUSCULAR; INTRAVENOUS
Status: DISCONTINUED | OUTPATIENT
Start: 2024-11-12 | End: 2024-11-12 | Stop reason: HOSPADM

## 2024-11-12 RX ORDER — SODIUM CHLORIDE 9 MG/ML
INJECTION, SOLUTION INTRAVENOUS CONTINUOUS
Status: DISCONTINUED | OUTPATIENT
Start: 2024-11-12 | End: 2024-11-12 | Stop reason: HOSPADM

## 2024-11-12 RX ORDER — LIDOCAINE HYDROCHLORIDE 20 MG/ML
INJECTION, SOLUTION EPIDURAL; INFILTRATION; INTRACAUDAL; PERINEURAL PRN
Status: DISCONTINUED | OUTPATIENT
Start: 2024-11-12 | End: 2024-11-12 | Stop reason: SURG

## 2024-11-12 RX ORDER — HYDRALAZINE HYDROCHLORIDE 20 MG/ML
5 INJECTION INTRAMUSCULAR; INTRAVENOUS
Status: DISCONTINUED | OUTPATIENT
Start: 2024-11-12 | End: 2024-11-12 | Stop reason: HOSPADM

## 2024-11-12 RX ORDER — HYDROMORPHONE HYDROCHLORIDE 1 MG/ML
0.1 INJECTION, SOLUTION INTRAMUSCULAR; INTRAVENOUS; SUBCUTANEOUS
Status: DISCONTINUED | OUTPATIENT
Start: 2024-11-12 | End: 2024-11-12 | Stop reason: HOSPADM

## 2024-11-12 RX ORDER — SODIUM CHLORIDE 9 MG/ML
INJECTION, SOLUTION INTRAVENOUS
Status: DISCONTINUED | OUTPATIENT
Start: 2024-11-12 | End: 2024-11-12 | Stop reason: SURG

## 2024-11-12 RX ORDER — HYDROMORPHONE HYDROCHLORIDE 1 MG/ML
0.4 INJECTION, SOLUTION INTRAMUSCULAR; INTRAVENOUS; SUBCUTANEOUS
Status: DISCONTINUED | OUTPATIENT
Start: 2024-11-12 | End: 2024-11-12 | Stop reason: HOSPADM

## 2024-11-12 RX ORDER — HYDROMORPHONE HYDROCHLORIDE 1 MG/ML
0.2 INJECTION, SOLUTION INTRAMUSCULAR; INTRAVENOUS; SUBCUTANEOUS
Status: DISCONTINUED | OUTPATIENT
Start: 2024-11-12 | End: 2024-11-12 | Stop reason: HOSPADM

## 2024-11-12 RX ADMIN — PROPOFOL 200 MG: 10 INJECTION, EMULSION INTRAVENOUS at 07:07

## 2024-11-12 RX ADMIN — ONDANSETRON 4 MG: 2 INJECTION INTRAMUSCULAR; INTRAVENOUS at 07:17

## 2024-11-12 RX ADMIN — LIDOCAINE HYDROCHLORIDE 100 MG: 20 INJECTION, SOLUTION EPIDURAL; INFILTRATION; INTRACAUDAL; PERINEURAL at 07:07

## 2024-11-12 RX ADMIN — Medication 100 MCG: at 07:12

## 2024-11-12 RX ADMIN — SODIUM CHLORIDE: 9 INJECTION, SOLUTION INTRAVENOUS at 07:03

## 2024-11-12 RX ADMIN — FENTANYL CITRATE 50 MCG: 50 INJECTION, SOLUTION INTRAMUSCULAR; INTRAVENOUS at 07:04

## 2024-11-12 RX ADMIN — Medication 100 MCG: at 07:31

## 2024-11-12 RX ADMIN — FENTANYL CITRATE 25 MCG: 50 INJECTION, SOLUTION INTRAMUSCULAR; INTRAVENOUS at 08:18

## 2024-11-12 RX ADMIN — DEXAMETHASONE SODIUM PHOSPHATE 4 MG: 4 INJECTION INTRA-ARTICULAR; INTRALESIONAL; INTRAMUSCULAR; INTRAVENOUS; SOFT TISSUE at 07:12

## 2024-11-12 RX ADMIN — CEFAZOLIN 2 G: 1 INJECTION, POWDER, FOR SOLUTION INTRAMUSCULAR; INTRAVENOUS at 07:07

## 2024-11-12 RX ADMIN — OXYCODONE HYDROCHLORIDE 5 MG: 5 SOLUTION ORAL at 08:18

## 2024-11-12 ASSESSMENT — FIBROSIS 4 INDEX: FIB4 SCORE: 0.96

## 2024-11-12 ASSESSMENT — PAIN DESCRIPTION - PAIN TYPE
TYPE: SURGICAL PAIN

## 2024-11-12 NOTE — H&P
Surgery Orthopedic History & Physical Note    Date  11/12/2024    Primary Care Physician  Carrie Orozco M.D.    CC  Pre-Op Diagnosis Codes:      * Painful orthopaedic hardware (HCC) [T84.84XA]    HPI  Patient ID:  Johnny Morelos is a 68 y.o. male.     Chief Complaint:  Pain of the Left Foot     Last Surgery: Left Foot Hallux Valgus Correction, Left Akin Osteotomy, - Left, Left Distal Soft Tissue Procedure - Left, Left Second, Third Distal Metatarsal Osteotomy - Left, Left Second, Third Metatarsophalangeal Joint Reconstruction - Left, Left Second And Third Hammertoe Correction - Left, and Left Second Webspace Neuroma Excision - Left on 12/30/2021     HPI Johnny is 2-1/2 years postop and overall has done well.  His primary complaints were his medial eminence as well as neuroma and metatarsalgia type pain.  These pains have resolved however his lesser toes have gone back into valgus.  This does not significantly bother him.  What bothers him now is that he is developed hardware type pain as well as a ganglion medially in his left foot.  This is bothering him in shoes and when he skis.     Review of Systems is negative     Past Medical History        Past Medical History:   Diagnosis Date    BMI 27.0-27.9,adult 07/28/2020    Heart burn      High cholesterol      Hypertension           Medications Ordered Prior to Encounter          Current Outpatient Medications on File Prior to Visit   Medication Sig Dispense Refill    ranolazine (RANEXA) 500 MG TABLET SR 12 HR Take 1 Tablet by mouth 2 times a day. 60 Tablet 3    ezetimibe (ZETIA) 10 MG Tab Take 1 Tablet by mouth every day. 90 Tablet 3    furosemide (LASIX) 20 MG Tab TAKE 1 TABLET BY MOUTH AS NEEDED (TAKE 1 DAILY AS NEED FOR SHORTNESS OF BREATH, LOWER EXTREMITY EDEMA OR WEIGHT GAIN OF 3LBS IN A DAY OR 5LBS IN A DAY). (Patient not taking: Reported on 7/30/2024) 90 Tablet 1    atorvastatin (LIPITOR) 80 MG tablet Take 1 Tablet by mouth every evening. 90 Tablet 3     carvedilol (COREG) 3.125 MG Tab Take 1 Tablet by mouth 2 times a day with meals. 200 Tablet 3    acetaminophen (TYLENOL) 500 MG Tab Take 2 Tablets by mouth every 6 hours as needed for Mild Pain. (Patient not taking: Reported on 7/30/2024) 30 Tablet 0    omeprazole (PRILOSEC) 20 MG delayed-release capsule Take 1 Capsule by mouth every day. (Patient not taking: Reported on 7/30/2024) 30 Capsule 2    aspirin 81 MG EC tablet Take 1 Tablet by mouth every day. 100 Tablet 2      No current facility-administered medications on file prior to visit.                  Objective  Ortho Exam  Alert and oriented no apparent distress.  As always very pleasant conversation.  Chest breathing comfortably heart regular rate and rhythm.  He has a wide forefoot bilaterally.  On the surgical side he has maintained corrected hallux valgus however has developed a palpable and visible bursa over his distal screw.  His proximal screw was also palpable.  He does not have any wounds.  His lesser toes are in valgus but no longer does he have prominent metatarsal heads.  He is neurovascularly intact.     Imaging Result(s):   Dx-Foot-Complete 3+  New x-rays taken today AP lateral oblique of the left foot show that he   has healed his chevron osteotomy.  Both screws are backing out a bit but   particularly the distal screw.  His distal metatarsal osteotomies have   healed but his lesser toes particularly the second and third are in   dorsiflexion and valgus.           Assessment & Plan  Encounter Diagnoses:   Pain from implanted hardware, initial encounter     Ganglion cyst of left foot     Pain in left foot         Orders Placed This Encounter    DX-FOOT-COMPLETE 3+ LEFT      Johnny is a very pleasant active 68-year-old gentleman status post hallux valgus correction now with hardware pain and a ganglion that has developed along the medial foot.  The the persistent valgus of his lesser toes is not bothersome to him.  He is indicated for removal of  both screws and excision of his ganglion.  He will be weightbearing as tolerated in his shoe postoperatively.  I filled out a scheduling form and look forward to seeing him on a scheduled date.       Past Medical History:   Diagnosis Date    BMI 27.0-27.9,adult 07/28/2020    Cardiovascular disease     Elevated blood pressure reading in office without diagnosis of hypertension 08/05/2022    Heart burn     Hemorrhagic disorder (HCC)     High cholesterol     Hypertension     Obesity (BMI 30.0-34.9) 08/05/2022       Past Surgical History:   Procedure Laterality Date    MULTIPLE CORONARY ARTERY BYPASS ENDO VEIN HARVEST  1/18/2024    Procedure: CORONARY ARTERY BYPASS GRAFT X3, ENDOSCOPIC VEIN HARVEST;  Surgeon: Arnaldo Bacon D.O.;  Location: SURGERY [facility];  Service: Cardiothoracic    ECHOCARDIOGRAM, TRANSESOPHAGEAL, INTRAOPERATIVE  1/18/2024    Procedure: ECHOCARDIOGRAM, TRANSESOPHAGEAL, INTRAOPERATIVE;  Surgeon: Arnaldo Bacon D.O.;  Location: [facility];  Service: Cardiothoracic    ENDARTERECTOMY  1/18/2024    Procedure: ENDARTERECTOMY- RIGHT CORONARY ARTERY;  Surgeon: Arnaldo Bacon D.O.;  Location: SURGERY [facility];  Service: Cardiothoracic    PB RECONSTRUC TOE DEFORM,SOFT TISSUE Left 12/30/2021    Procedure: LEFT FOOT HALLUX VALGUS CORRECTION, LEFT AKIN OSTEOTOMY,;  Surgeon: Ollie Kovacs M.D.;  Location: [facility];  Service: Orthopedics    AL COR HLX VLGS PRX PHLX OSTEOT Left 12/30/2021    Procedure: LEFT DISTAL SOFT TISSUE PROCEDURE;  Surgeon: Ollie Kovacs M.D.;  Location: [facility];  Service: Orthopedics    PB OSTEOTOMY METATARSALS,MULTIPLE Left 12/30/2021    Procedure: LEFT SECOND, THIRD DISTAL METATARSAL OSTEOTOMY;  Surgeon: Ollie Kovacs M.D.;  Location: [facility];  Service: Orthopedics    PB PART EXCIS 5TH METATARSAL HEAD Left 12/30/2021    Procedure: LEFT SECOND, THIRD METATARSOPHALANGEAL JOINT  RECONSTRUCTION;  Surgeon: Ollie Kovacs M.D.;  Location: [facility];  Service: Orthopedics    PB REPAIR OF HAMMERTOE,ONE Left 12/30/2021    Procedure: LEFT SECOND AND THIRD HAMMERTOE CORRECTION;  Surgeon: Ollie Kovacs M.D.;  Location: [facility];  Service: Orthopedics    PB EXCIS INTERDIGITAL NEUROMA,EA Left 12/30/2021    Procedure: LEFT SECOND WEBSPACE NEUROMA EXCISION;  Surgeon: Ollie Kovacs M.D.;  Location: [facility];  Service: Orthopedics       Current Facility-Administered Medications   Medication Dose Route Frequency Provider Last Rate Last Admin    acetaminophen (Tylenol) tablet 1,000 mg  1,000 mg Oral Once Neetu Saravia M.D.        ceFAZolin (Ancef) injection 2 g  2 g Intravenous Once Ollie Kovacs M.D.        BUPIVACAINE HCL (PF) 0.5 % INJ SOLN                Social History     Socioeconomic History    Marital status: Single     Spouse name: Not on file    Number of children: Not on file    Years of education: Not on file    Highest education level: Not on file   Occupational History    Not on file   Tobacco Use    Smoking status: Never    Smokeless tobacco: Never   Vaping Use    Vaping status: Never Used   Substance and Sexual Activity    Alcohol use: Yes     Alcohol/week: 0.6 oz     Types: 1 Shots of liquor per week     Comment: once a month    Drug use: Not Currently    Sexual activity: Not on file   Other Topics Concern    Not on file   Social History Narrative    Not on file     Social Drivers of Health     Financial Resource Strain: Not on file   Food Insecurity: Not on file   Transportation Needs: Not on file   Physical Activity: Not on file   Stress: Not on file   Social Connections: Not on file   Intimate Partner Violence: Not on file   Housing Stability: Not on file       Family History   Problem Relation Age of Onset    Heart Disease Mother         CHF    Heart Disease Father         bypass age 49, 75        Allergies  Patient has no known allergies.    Review of Systems  Negative    Physical Exam    Vital Signs  Blood Pressure : 137/87   Temperature: 36.3 °C (97.4 °F)   Pulse: 67   Respiration: 18   Pulse Oximetry: 97 %   Heart: RRR  Chest: Breathing comfortably      Labs:                    Radiology:  No orders to display         Assessment/Plan:  Pre-Op Diagnosis Codes:      * Painful orthopaedic hardware (HCC) [T84.84XA]  Procedure(s):  LEFT FOOT REMOVAL OF HARDWARE, BURSA EXCISION  BURSECTOMY

## 2024-11-12 NOTE — PROGRESS NOTES
0741- Patient arrived in PACU and was connected to monitors. Report received from anesthesiologist and OR RN. Vitals signs are stable. Patient is on 6L O2 via mask.  Dressing is CDI. Patient can wiggle toes and they are warm.     0800- Patient is starting to wake up. Vitals signs are stable. Pain is 2/10 and patient denies medication at this time.    0817- Patient meets phase II criteria. Discussed discharge plan of care and patient expressed understanding. All needs met at this time.    0845- Patient feels ready to be discharged and meets discharge criteria set by Dr. Kovacs. Patient is going to get dressed.    0850- Called patient's ride, Dax, and he is on the way for pickup.     0857- Provided patient is discharge education. All questions answered.    0910- PIV removed and patient discharged home via wheelchair.

## 2024-11-12 NOTE — ANESTHESIA POSTPROCEDURE EVALUATION
Patient: Joe Morelos    Procedure Summary       Date: 11/12/24 Room / Location: [facility] ROOM 24 / SURGERY SAME DAY [facility]    Anesthesia Start:  Anesthesia Stop:     Procedures:       LEFT FOOT REMOVAL OF HARDWARE, BURSA EXCISION (Left)      BURSECTOMY Diagnosis:       Painful orthopaedic hardware (HCC)      (Painful orthopaedic hardware (HCC) [T84.84XA])    Surgeons: Ollie Kovacs M.D. Responsible Provider:     Anesthesia Type: general ASA Status: 3            Final Anesthesia Type: general  Last vitals  BP   Blood Pressure : (!) 145/83    Temp   36.2 °C (97.1 °F)    Pulse   65   Resp   20    SpO2   99 %      Anesthesia Post Evaluation    Patient location during evaluation: PACU  Patient participation: complete - patient participated  Level of consciousness: awake and alert    Airway patency: patent  Anesthetic complications: no  Cardiovascular status: hemodynamically stable  Respiratory status: acceptable  Hydration status: euvolemic    PONV: none          No notable events documented.     Nurse Pain Score: 0 (NPRS)

## 2024-11-12 NOTE — OP REPORT
SURGEON:  Ollie Kovacs MD      PREOPERATIVE DIAGNOSIS:    1.  Status post left hallux valgus correction procedure  2.  Left medial foot painful hardware  3.  Left medial foot ganglion cyst    POSTOPERATIVE DIAGNOSIS:    Same    PROCEDURES PERFORMED:      1.  Removal of hardware left foot through the distal incision  2.  Removal of hardware left foot through the proximal incision  3.  Left ganglion cyst excision    ASSISTANT: DENIS Cuellar    ANESTHESIA:   General with 30 cc local half percent Marcaine without epinephrine    IMPLANTS: None, removal of REGGIE screws x 2    TOURNIQUET TIME: 15 minutes at 250 mmHg    EBL: 10 cc    COMPLICATIONS:  None.    DISPOSITION:  Stable to Post Anesthesia Care Unit.    INDICATIONS: Johnny is a pleasant and active 69-year-old gentleman underwent hallux valgus corrective procedure.  He is overall done well in terms of significant improvement in his pain.  He has however developed pain related to screw heads x 2 and has developed a ganglion cyst.    PROCEDURE IN DETAIL:   Greeted in the preop holding area and identified by name medical record number.  The left lower extremity was marked.  Risks of the procedure including bleeding, infection, pain, malunion, nonunion, neurovascular damage and need for more surgery were discussed and he provided written consent.  He was taken to the operating room and placed on table in supine position.  Preop antibiotics were administered and general anesthesia was induced.  A nonsterile tourniquet was placed on the left thigh and the left lower extremity prepped and draped in the usual sterile fashion.  An operative pause was taken where all present were in agreement with patient identification, laterality and procedure to be performed.  The limb was elevated for 5 minutes and the tourniquet raised to 250 mmHg.    We made a 1 cm longitudinal incision directly over a palpable cyst on the medial border of the foot.  We did so plantar to the  dorsal cutaneous nerve.  Careful blunt dissection was carried out around the cyst.  It measured about 8 mm x 10 mm and was removed in its entirety.  There was underlying scar that we removed with a rongeur.  We then dissected deep to this area to that of the distal screw.  It was able to be removed.  Screw tract was curetted.    I made a 1 cm longitudinal incision over the medial border of the foot about 4 cm proximal to the previous incision.  Blunt dissection was carried distally.  Through the screw head with radiographic assist.  We used a guidepin to cannulate the screw and were able to remove it with the appropriate .  Screw tract was curetted.  Radiographically we had nice healing of his osteotomy without evidence of interval complication.  Tourniquet was let down hemostasis were achieved.  Incisions copiously irrigated.  Skin closed with 3-0 nylon in horizontal mattress fashion.  Adaptic gauze and a compressive wrap was placed.  He was awakened and explained in stable condition.    POSTOPERATIVE COURSE: He will discharge home today assuming adequate pain control mobilization.  Weightbearing as tolerated in a protective shoe.  I will see him in 10 to 14 days for suture removal and wound check, we will wean him out of the shoe at that time.    Ollie Kovacs MD

## 2024-11-12 NOTE — DISCHARGE INSTRUCTIONS
HOME CARE INSTRUCTIONS    ACTIVITY: Rest and take it easy for the first 24 hours.  A responsible adult is recommended to remain with you during that time.  It is normal to feel sleepy.  We encourage you to not do anything that requires balance, judgment or coordination.    FOR 24 HOURS DO NOT:  Drive, operate machinery or run household appliances.  Drink beer or alcoholic beverages.  Make important decisions or sign legal documents.    SPECIAL INSTRUCTIONS:   Weight bearing as tolerated on left lower extremity in postop shoe   Ice and elevate   Stay ahead of pain   Keep dressing clean and dry until follow up appointment.    DIET: To avoid nausea, slowly advance diet as tolerated, avoiding spicy or greasy foods for the first day.  Add more substantial food to your diet according to your physician's instructions. INCREASE FLUIDS AND FIBER TO AVOID CONSTIPATION.      MEDICATIONS: Resume taking daily medication.  Take prescribed pain medication with food.  If no medication is prescribed, you may take non-aspirin pain medication if needed.  PAIN MEDICATION CAN BE VERY CONSTIPATING.  Take a stool softener or laxative such as senokot, pericolace, or milk of magnesia if needed.    Prescription given for Oxycodone, Vistaril, and Gabapentin.  Last pain medication given at 8:17. Next dose of oxycodone may be taken at 2:17 pm.     A follow-up appointment should be arranged with your doctor; call to schedule.    You should CALL YOUR PHYSICIAN if you develop:  Fever greater than 101 degrees F.  Pain not relieved by medication, or persistent nausea or vomiting.  Excessive bleeding (blood soaking through dressing) or unexpected drainage from the wound.  Extreme redness or swelling around the incision site, drainage of pus or foul smelling drainage.  Inability to urinate or empty your bladder within 8 hours.  Problems with breathing or chest pain.    You should call 911 if you develop problems with breathing or chest pain.  If you  are unable to contact your doctor or surgical center, you should go to the nearest emergency room or urgent care center.  Physician's telephone #: [phone]    MILD FLU-LIKE SYMPTOMS ARE NORMAL.  YOU MAY EXPERIENCE GENERALIZED MUSCLE ACHES, THROAT IRRITATION, HEADACHE AND/OR SOME NAUSEA.    If any questions arise, call your doctor.  If your doctor is not available, please feel free to call the Surgical Center at [phone].  The Center is open Monday through Friday from 7AM to 7PM.      A registered nurse may call you a few days after your surgery to see how you are doing after your procedure.    You may also receive a survey in the mail within the next two weeks and we ask that you take a few moments to complete the survey and return it to us.  Our goal is to provide you with very good care and we value your comments.     Depression / Suicide Risk    As you are discharged from this [facility] facility, it is important to learn how to keep safe from harming yourself.    Recognize the warning signs:  Abrupt changes in personality, positive or negative- including increase in energy   Giving away possessions  Change in eating patterns- significant weight changes-  positive or negative  Change in sleeping patterns- unable to sleep or sleeping all the time   Unwillingness or inability to communicate  Depression  Unusual sadness, discouragement and loneliness  Talk of wanting to die  Neglect of personal appearance   Rebelliousness- reckless behavior  Withdrawal from people/activities they love  Confusion- inability to concentrate     If you or a loved one observes any of these behaviors or has concerns about self-harm, here's what you can do:  Talk about it- your feelings and reasons for harming yourself  Remove any means that you might use to hurt yourself (examples: pills, rope, extension cords, firearm)  Get professional help from the community (Mental Health, Substance Abuse, psychological counseling)  Do  not be alone:Call your Safe Contact- someone whom you trust who will be there for you.  Call your local CRISIS HOTLINE 971-2236 or 467-864-5475  Call your local Children's Mobile Crisis Response Team [place] (720) 648-1119 or www.[employer]  Call the toll free National Suicide Prevention Hotlines   National Suicide Prevention Lifeline 713-364-GLMI (4658)  [facility] Line Network 800-TVWIXYP (557-9688)    I acknowledge receipt and understanding of these Home Care instructions.

## 2024-11-12 NOTE — ANESTHESIA TIME REPORT
Anesthesia Start and Stop Event Times       Date Time Event    11/12/2024 0641 Ready for Procedure     0703 Anesthesia Start     0743 Anesthesia Stop          Responsible Staff  11/12/24      Name Role Begin End    Neetu Saravia M.D. Anesth 0703 0743          Overtime Reason:  no overtime (within assigned shift)    Comments:

## 2024-11-12 NOTE — ANESTHESIA PREPROCEDURE EVALUATION
Case: [id number] Date/Time: 11/12/24 0715    Procedures:       LEFT FOOT REMOVAL OF HARDWARE, BURSA EXCISION (Left)      BURSECTOMY    Diagnosis: Painful orthopaedic hardware (HCC) [T84.84XA]    Pre-op diagnosis: Painful orthopaedic hardware (HCC) [T84.84XA]    Location: [facility] ROOM 24 / SURGERY SAME DAY [facility]    Surgeons: Ollie Kovacs M.D.          70 yo M with history of CAD s/p CABGx3 1/2024 and post-op afib now in sinus rhythm. Active. No current CP/SOB/N/V symptoms.     NPO  Relevant Problems   ANESTHESIA   (negative) History of anesthesia complications      CARDIAC   (positive) Atherosclerosis of native coronary artery of native heart with angina pectoris (HCC)   (positive) Atrial fibrillation (HCC)   (positive) Presence of drug coated stent in coronary artery      Other   (positive) Hx of CABG       Physical Exam    Airway   Mallampati: II  TM distance: >3 FB  Neck ROM: full       Cardiovascular - normal exam  Rhythm: regular  Rate: normal  (-) murmur     Dental - normal exam        Facial Hair   Pulmonary - normal exam  Breath sounds clear to auscultation     Abdominal    Neurological - normal exam                   Anesthesia Plan    ASA 3   ASA physical status 3 criteria: CAD/stents (> 3 months)    Plan - general       Airway plan will be LMA          Induction: intravenous    Postoperative Plan: Postoperative administration of opioids is intended.    Pertinent diagnostic labs and testing reviewed    Informed Consent:    Anesthetic plan and risks discussed with patient.

## 2024-11-12 NOTE — ANESTHESIA PROCEDURE NOTES
Airway    Date/Time: 11/12/2024 7:08 AM    Performed by: Neetu Saravia M.D.  Authorized by: Neetu Saravia M.D.    Location:  OR  Urgency:  Elective  Difficult Airway: No    Indications for Airway Management:  Anesthesia      Spontaneous Ventilation: absent    Sedation Level:  Deep  Preoxygenated: Yes    Mask Difficulty Assessment:  0 - not attempted  Final Airway Type:  Supraglottic airway  Final Supraglottic Airway:  Standard LMA    SGA Size:  5  Number of Attempts at Approach:  1

## 2024-11-12 NOTE — LETTER
October 2, 2024    Patient Name: Joe Morelos  Surgeon Name: Ollie Kovacs M.D.  Surgery Facility: [facility] ([address] [facility]  Surgery Date: 11/12/2024    The time of your surgery is not final and may change up to and until the day of your surgery. You will be contacted 24-48 hours prior to your surgery date with your check-in and surgery time.    If you will not be at one of the below numbers please call the surgery scheduler at [phone]  Preferred Phone: [phone]    BEFORE YOUR SURGERY   Pre Registration and/or Lab Work must be done within and no earlier than 28 days prior to your surgery date. Your scheduled facility will contact you regarding all required preregistration and/or lab work. If you have not been contacted within 7 days of your scheduled procedure please call [facility] at [phone] for an appointment as soon as possible.    [facility] offers a class for patients undergoing a total hip/knee replacement surgery scheduled at our outpatient surgery center. Information about what to expect for preparation, the day of surgery and recovery will be given. We highly recommend bringing your support for surgery with you to the class, as well as any questions you may have. If you are interested in attending the class, please call [phone] for scheduling.     Pre op Appointment:  Instructions: Bring a list of all medications you are taking including the dosing and frequency.    DAY OF YOUR SURGERY  Nothing to eat or drink after midnight     Refrain from smoking any substance after midnight prior to surgery. Smoking may interfere with the anesthetic and frequently produces nausea during the recovery period.    Continue taking all lifesaving medications. Including the morning of your surgery with small sip of water.    Please do NOT take on the day of surgery:  Diuretics: examples- furosemide (Lasix),  spironolactone, hydrochlorothiazide  ACE-inhibitors: examples- lisinopril, ramipril, enalapril  “ARBs”: examples- losartan, Olmesartan, valsartan    Please arrive at the hospital/surgery center at the check-in time provided.     An adult will need to bring you and take you home after your surgery.     AFTER YOUR SURGERY  Post op Appointment:   Date: 11/27/24   Time: 10:00AM   With: Ollie Kovacs MD   Location: [address] [place] [id number]     TIME OFF WORK  FMLA or Disability forms can be faxed directly to: [phone] or you may drop them off at [address] [place] [address] [place] [address]. Our office charges a $35.00 fee per form. Forms will be completed within 10 business days of drop off and payment received. For the status of your forms you may contact our disability office directly [phone].    MEDICATION INSTRUCTIONS **Please read section completely**  The following medications should be stopped a minimum of 10 days prior to surgery:  All over the counter, Supplements & Herbal medications    Anorectics: Phentermine (Adipex-P, Lomaira and Suprenza), Phentermine-topiramate (Qsymia), Bupropion-naltrexone (Contrave)    **If you are on Bupropion for anxiety/depression, please continue this medication up until the day of surgery.     Opiod Partial Agonists/Opioid Antagonists: Buprenorphine (Suboxone, Belbuca, Butrans, Probuphine Implant, Sublocade), Naltrexone (ReVia, Vivitrol), Naloxone    Amphetamines: Dextroamphetamine/Amphetamine (Adderall, Mydayis), Methylphenidate Hydrochloride (Concerta, Metadate, Methylin, Ritalin)    The following medications should be stopped 5 days prior to surgery:  Blood Thinners: Any Aspirin, Aspirin products, anti-inflammatories such as ibuprofen and any blood thinners such as Coumadin and Plavix. Please consult your prescribing physician if you are on life saving blood thinners, in regards to when to stop medications prior to surgery.     The following medications should be  stopped a minimum of 3 days prior to surgery:  PDE-5 inhibitors: Sildenafil (Viagra), Tadalafil (Cialis), Vardenafil (Levitra), Avanafil (Stendra)    MAO Inhibitors: Rasagiline (Azilect), Selegiline (Eldepryl, Emsam, Selapar), Isocarboxazid (Marplan), Phenelzine (Nardil)    You can use [employer] to message your Care Team. Don't have a [employer] account? Sign up here:       COVID and INFLUENZA NOTICE TO PATIENTS    Currently, the [facility] does not routinely test patients for COVID-19 or Influenza prior to their elective surgery.  However, if patients develop the following symptoms prior to their surgery date, they should voluntarily test for COVID-19 and Influenza and notify the surgical office of their condition and results.  The symptoms warranting testing would be any two of the following:    Fever (Temp above 100.4 F)  Chills  Cough  Shortness of breath or difficulty breathing  Fatigue  Myalgias (muscle or body aches)  Headache  Sore Throat  Congestion or Runny Nose  Nausea or vomiting  Diarrhea  New loss of taste or smell    Having these symptoms prior to surgery can significantly increase your risk of morbidity and mortality under anesthesia, which may compromise your health and require a transfer to a hospital for a higher level of care.  Therefore, it is advisable to notify the surgical team of any illness in order to get information for the appropriate time to delay the surgery to minimize these preventable risks.    Your health and safety are our number one priority at the [facility], and we are thankful that you entrust us with your care.  Please help us ensure the best possible surgical and anesthetic outcome by sharing appropriate health information with our perioperative team and staff.  We look forward to taking great care of you!    Thank you for your time and consideration on this matter.    Erickson German MD  Medical Director  Anesthesiologist  [facility]

## 2024-11-20 ENCOUNTER — OFFICE VISIT (OUTPATIENT)
Dept: CARDIOLOGY | Facility: MEDICAL CENTER | Age: 69
End: 2024-11-20
Attending: PHYSICIAN ASSISTANT
Payer: MEDICARE

## 2024-11-20 VITALS
HEART RATE: 67 BPM | DIASTOLIC BLOOD PRESSURE: 64 MMHG | WEIGHT: 204.2 LBS | SYSTOLIC BLOOD PRESSURE: 100 MMHG | HEIGHT: 70 IN | BODY MASS INDEX: 29.23 KG/M2 | OXYGEN SATURATION: 95 % | RESPIRATION RATE: 18 BRPM

## 2024-11-20 DIAGNOSIS — Z95.5 S/P DRUG ELUTING CORONARY STENT PLACEMENT: ICD-10-CM

## 2024-11-20 DIAGNOSIS — R07.89 CHEST TIGHTNESS: ICD-10-CM

## 2024-11-20 DIAGNOSIS — E78.5 DYSLIPIDEMIA: ICD-10-CM

## 2024-11-20 DIAGNOSIS — I25.83 CORONARY ARTERY DISEASE DUE TO LIPID RICH PLAQUE: ICD-10-CM

## 2024-11-20 DIAGNOSIS — Z79.899 HIGH RISK MEDICATION USE: ICD-10-CM

## 2024-11-20 DIAGNOSIS — I48.91 ATRIAL FIBRILLATION, UNSPECIFIED TYPE (HCC): ICD-10-CM

## 2024-11-20 DIAGNOSIS — Z95.1 S/P CABG X 3: ICD-10-CM

## 2024-11-20 DIAGNOSIS — I25.10 CORONARY ARTERY DISEASE DUE TO LIPID RICH PLAQUE: ICD-10-CM

## 2024-11-20 DIAGNOSIS — I10 ESSENTIAL HYPERTENSION, BENIGN: ICD-10-CM

## 2024-11-20 DIAGNOSIS — I51.9 DYSFUNCTION OF RIGHT CARDIAC VENTRICLE: ICD-10-CM

## 2024-11-20 PROCEDURE — 3078F DIAST BP <80 MM HG: CPT | Performed by: PHYSICIAN ASSISTANT

## 2024-11-20 PROCEDURE — 3074F SYST BP LT 130 MM HG: CPT | Performed by: PHYSICIAN ASSISTANT

## 2024-11-20 PROCEDURE — 99212 OFFICE O/P EST SF 10 MIN: CPT | Performed by: PHYSICIAN ASSISTANT

## 2024-11-20 PROCEDURE — 99214 OFFICE O/P EST MOD 30 MIN: CPT | Performed by: PHYSICIAN ASSISTANT

## 2024-11-20 ASSESSMENT — ENCOUNTER SYMPTOMS
HEADACHES: 0
CHILLS: 0
DIZZINESS: 0
COUGH: 0
MYALGIAS: 0
BLURRED VISION: 0
WEAKNESS: 0
PSYCHIATRIC NEGATIVE: 1
DOUBLE VISION: 0
CONSTITUTIONAL NEGATIVE: 1
CLAUDICATION: 0
GASTROINTESTINAL NEGATIVE: 1
NEUROLOGICAL NEGATIVE: 1
EYES NEGATIVE: 1
BRUISES/BLEEDS EASILY: 0
PALPITATIONS: 0
PND: 0
SHORTNESS OF BREATH: 0
ORTHOPNEA: 0
LOSS OF CONSCIOUSNESS: 0
NAUSEA: 0
VOMITING: 0
FEVER: 0
MUSCULOSKELETAL NEGATIVE: 1
ABDOMINAL PAIN: 0

## 2024-11-20 ASSESSMENT — FIBROSIS 4 INDEX: FIB4 SCORE: 1.26

## 2024-11-20 NOTE — PROGRESS NOTES
Chief Complaint   Patient presents with    Atrial Fibrillation    Follow-Up     Dx: Atherosclerosis of native coronary artery of native heart with angina pectoris (HCC)          Subjective     Johnny Morelos is a 69 y.o. male with a history of CAD s/p CABG s/p subsequent stent placement in the VG to OM2, post operative atrial fibrillation, right ventricular dysfunction, dyslipidemia, hypertension, prediabetes and obesity who presents today for follow up.     His primary cardiologist is Dr. Eldridge. Last seen by me 8/15/2024. At that exam he he was seen after his coronary angiogram with stent placement. He was doing very well other than some slight sternal pain after sneezing that was reproducible on palpation. He was able to walk 4 miles daily without any cardiac symptoms. He wished to stop ranexa since he was post stent so this was discontinued. He was encouraged to continue DAPT without interruption. His medications were continued and he was to follow up in 3 months. 11/12/2024 he underwent foot hardware removal without any complications.    Today, he reports he is doing very well. He has been back to his usual activities and is walking 5 miles daily on a more intense route. He did sneeze 2 days ago and felt like he pulled a muscle around his sternum but this is resolving. He did end up stopping DAPT for 1-2 days prior to his foot procedure because he was in [place], but he immediately resumed it when he returned home. No chest pain or palpitations. No shortness of breath, dyspnea on exertion, orthopnea or PND. No lower extremity edema. No dizziness or lightheadedness. No syncope or presyncope.       Past Medical History:   Diagnosis Date    BMI 27.0-27.9,adult 07/28/2020    Cardiovascular disease     Elevated blood pressure reading in office without diagnosis of hypertension 08/05/2022    Heart burn     Hemorrhagic disorder (HCC)     High cholesterol     Hypertension     Obesity (BMI 30.0-34.9) 08/05/2022      Past Surgical History:   Procedure Laterality Date    HARDWARE REMOVAL ORTHO Left 11/12/2024    Procedure: LEFT FOOT REMOVAL OF HARDWARE, BURSA EXCISION;  Surgeon: Ollie Kovacs M.D.;  Location: SURGERY SAME DAY [facility];  Service: Orthopedics    BURSA EXCISION Left 11/12/2024    Procedure: BURSECTOMY;  Surgeon: Ollie Kovacs M.D.;  Location: SURGERY SAME DAY [facility];  Service: Orthopedics    MULTIPLE CORONARY ARTERY BYPASS ENDO VEIN HARVEST  1/18/2024    Procedure: CORONARY ARTERY BYPASS GRAFT X3, ENDOSCOPIC VEIN HARVEST;  Surgeon: Arnaldo Bacon D.O.;  Location: SURGERY [facility];  Service: Cardiothoracic    ECHOCARDIOGRAM, TRANSESOPHAGEAL, INTRAOPERATIVE  1/18/2024    Procedure: ECHOCARDIOGRAM, TRANSESOPHAGEAL, INTRAOPERATIVE;  Surgeon: Arnaldo Bacon D.O.;  Location: SURGERY [facility];  Service: Cardiothoracic    ENDARTERECTOMY  1/18/2024    Procedure: ENDARTERECTOMY- RIGHT CORONARY ARTERY;  Surgeon: Arnaldo Bacon D.O.;  Location: SURGERY [facility];  Service: Cardiothoracic    PB RECONSTRUC TOE DEFORM,SOFT TISSUE Left 12/30/2021    Procedure: LEFT FOOT HALLUX VALGUS CORRECTION, LEFT AKIN OSTEOTOMY,;  Surgeon: Ollie Kovacs M.D.;  Location: [facility];  Service: Orthopedics    CA COR HLX VLGS PRX PHLX OSTEOT Left 12/30/2021    Procedure: LEFT DISTAL SOFT TISSUE PROCEDURE;  Surgeon: Ollie Kovacs M.D.;  Location: [facility];  Service: Orthopedics    PB OSTEOTOMY METATARSALS,MULTIPLE Left 12/30/2021    Procedure: LEFT SECOND, THIRD DISTAL METATARSAL OSTEOTOMY;  Surgeon: Ollie Kovacs M.D.;  Location: [facility];  Service: Orthopedics    PB PART EXCIS 5TH METATARSAL HEAD Left 12/30/2021    Procedure: LEFT SECOND, THIRD METATARSOPHALANGEAL JOINT RECONSTRUCTION;  Surgeon: Ollie Kovacs M.D.;  Location: [facility];  Service: Orthopedics    PB REPAIR OF HAMMERTOE,ONE Left 12/30/2021     Procedure: LEFT SECOND AND THIRD HAMMERTOE CORRECTION;  Surgeon: Ollie Kovacs M.D.;  Location: [facility];  Service: Orthopedics    PB EXCIS INTERDIGITAL NEUROMA,EA Left 12/30/2021    Procedure: LEFT SECOND WEBSPACE NEUROMA EXCISION;  Surgeon: Ollie Kovacs M.D.;  Location: [facility];  Service: Orthopedics     Family History   Problem Relation Age of Onset    Heart Disease Mother         CHF    Heart Disease Father         bypass age 49, 75     Social History     Socioeconomic History    Marital status: Single     Spouse name: Not on file    Number of children: Not on file    Years of education: Not on file    Highest education level: Not on file   Occupational History    Not on file   Tobacco Use    Smoking status: Never    Smokeless tobacco: Never   Vaping Use    Vaping status: Never Used   Substance and Sexual Activity    Alcohol use: Yes     Alcohol/week: 0.6 oz     Types: 1 Shots of liquor per week     Comment: once a month    Drug use: Not Currently    Sexual activity: Not on file   Other Topics Concern    Not on file   Social History Narrative    Not on file     Social Drivers of Health     Financial Resource Strain: Not on file   Food Insecurity: Not on file   Transportation Needs: Not on file   Physical Activity: Not on file   Stress: Not on file   Social Connections: Not on file   Intimate Partner Violence: Not on file   Housing Stability: Not on file     No Known Allergies  Outpatient Encounter Medications as of 11/20/2024   Medication Sig Dispense Refill    clopidogrel (PLAVIX) 75 MG Tab Take 1 Tablet by mouth every day. 100 Tablet 3    atorvastatin (LIPITOR) 80 MG tablet Take 80 mg by mouth every evening.      ezetimibe (ZETIA) 10 MG Tab Take 10 mg by mouth every day.      carvedilol (COREG) 3.125 MG Tab Take 1 Tablet by mouth 2 times a day with meals. 200 Tablet 3    aspirin 81 MG EC tablet Take 1 Tablet by mouth every day. 100 Tablet 2    gabapentin  "(NEURONTIN) 300 MG Cap 1 po q hs prn nerve pain (Patient not taking: Reported on 11/20/2024) 14 Capsule 0     No facility-administered encounter medications on file as of 11/20/2024.     Review of Systems   Constitutional: Negative.  Negative for chills, fever and malaise/fatigue.   HENT: Negative.     Eyes: Negative.  Negative for blurred vision and double vision.   Respiratory:  Negative for cough and shortness of breath.    Cardiovascular:  Negative for chest pain, palpitations, orthopnea, claudication, leg swelling and PND.   Gastrointestinal: Negative.  Negative for abdominal pain, nausea and vomiting.   Genitourinary: Negative.    Musculoskeletal: Negative.  Negative for myalgias.   Skin: Negative.  Negative for rash.   Neurological: Negative.  Negative for dizziness, loss of consciousness, weakness and headaches.   Endo/Heme/Allergies: Negative.  Does not bruise/bleed easily.   Psychiatric/Behavioral: Negative.                Objective     /64 (BP Location: Left arm, Patient Position: Sitting, BP Cuff Size: Adult)   Pulse 67   Resp 18   Ht 1.778 m (5' 10\")   Wt 92.6 kg (204 lb 3.2 oz)   SpO2 95%   BMI 29.30 kg/m²     Physical Exam  Vitals reviewed.   Constitutional:       General: He is not in acute distress.     Appearance: Normal appearance.   HENT:      Head: Normocephalic and atraumatic.      Right Ear: External ear normal.      Left Ear: External ear normal.   Eyes:      General: No scleral icterus.     Extraocular Movements: Extraocular movements intact.      Conjunctiva/sclera: Conjunctivae normal.      Pupils: Pupils are equal, round, and reactive to light.   Cardiovascular:      Rate and Rhythm: Normal rate and regular rhythm.      Pulses: Normal pulses.      Heart sounds: Normal heart sounds. No murmur heard.     No friction rub. No gallop.   Pulmonary:      Effort: Pulmonary effort is normal.      Breath sounds: Normal breath sounds.   Abdominal:      General: Bowel sounds are normal. "      Palpations: Abdomen is soft.      Tenderness: There is no abdominal tenderness.   Musculoskeletal:         General: Normal range of motion.      Cervical back: Normal range of motion and neck supple.      Right lower leg: No edema.      Left lower leg: No edema.   Skin:     General: Skin is warm and dry.      Capillary Refill: Capillary refill takes less than 2 seconds.   Neurological:      General: No focal deficit present.      Mental Status: He is alert and oriented to person, place, and time.   Psychiatric:         Mood and Affect: Mood normal.         Behavior: Behavior normal.         Judgment: Judgment normal.       Lab Results   Component Value Date/Time    CHOLSTRLTOT 123 09/24/2024 06:04 AM    LDL 59 09/24/2024 06:04 AM    HDL 45 09/24/2024 06:04 AM    TRIGLYCERIDE 96 09/24/2024 06:04 AM       Lab Results   Component Value Date/Time    SODIUM 137 11/12/2024 06:23 AM    POTASSIUM 4.1 11/12/2024 06:23 AM    CHLORIDE 104 11/12/2024 06:23 AM    CO2 21 11/12/2024 06:23 AM    GLUCOSE 102 (H) 11/12/2024 06:23 AM    BUN 13 11/12/2024 06:23 AM    CREATININE 0.83 11/12/2024 06:23 AM     Lab Results   Component Value Date/Time    ALKPHOSPHAT 78 11/12/2024 06:23 AM    ASTSGOT 33 11/12/2024 06:23 AM    ALTSGPT 36 11/12/2024 06:23 AM    TBILIRUBIN 0.7 11/12/2024 06:23 AM       EKG 7/30/2024 personally interpreted by me as sinus rhythm with RSR'  NM stress test 10/19/2023  NUCLEAR IMAGING INTERPRETATION   Questionable moderate sized reversible perfusion defect of the inferior wall.      SDS would be 3.   No focal wall motion abnormality seen.   LVEF 57%.   ECG INTERPRETATION   ST depression 1mm horizontal lateral leads at peak stress.     Echocardiogram 11/1/2023  CONCLUSIONS  Normal left ventricular systolic function. The ejection fraction is   measured to be 61 % by Reed's biplane.  Normal right ventricular size and systolic function.  Aortic sclerosis without stenosis. Mild aortic insufficiency.  Unable to  estimate right ventricular systolic pressure due to an   inadequate tricuspid regurgitant jet.  Mild dilatation of ascending aorta 4.3cm.  No prior study is available for comparison.     [facility] 12/21/2023  HEMODYNAMICS:   Aortic pressure: 120 /70 mmHg  LVEDP: 18 mmHg  No significant aortic gradient on pullback  CORONARY ANGIOGRAPHY:  The left main coronary artery : Large-caliber vessel with mild luminal irregularities, bifurcates to LAD and left circumflex  The left anterior descending coronary artery : Large-caliber transapical vessel with calcified proximal/midportion with diffuse disease throughout the proximal/mid LAD at 70%, iFR 0.75 suggestive of hemodynamically significant stenosis.  Left-to-right collaterals  The left circumflex coronary artery : Large-caliber vessel that gives rise to medium OM1 with diffuse nonobstructive CAD, large OM 2 that has severe 80% proximal stenosis before its bifurcation.  Left-to-right collaterals.  The right coronary artery  : Large-caliber dominant vessel with  of its midportion fills via left-to-right collaterals  INSTANTANEOUS WAVE FREE RATIO:  IFR of the proximal/mid left anterior descending coronary artery was 0.75: suggestive of a hemodynamically significant stenosis.  IMPRESSION:  Severe three-vessel CAD (mid RCA , mid cx-OM and prox/mid LAD IFR 0.75)  Mildly elevated resting LVEDP 18 mmHg with no significant transaortic gradient on pullback  RECOMMENDATIONS:  CT surgery have been consulted, they will arrange for outpatient consultation to discuss CABG as a revascularization option for this patient  Ongoing optimal secondary ASCVD prevention  TR band protocol     Echocardiogram 4/17/2024  CONCLUSIONS  Compared to the images of the prior study on 11/01/2023 - the right   ventricular function is now reduced.  The ascending aorta is not well   seen on the current exam, previously dilated at 4.3 cm.  Normal left ventricular systolic function.  The left ventricular  ejection fraction is visually estimated to be 65%.  Normal diastolic function.  Reduced right ventricular systolic function.  Mild aortic insufficiency.         [facility] 8/6/2024  IMPRESSIONS:  1.  Angina with unsatisfactory control despite medical therapy due to neointimal hyperplasia within the vein graft to the OM 2  2.  Successful PCI of the vein graft to OM 2 using 2 overlapping drug-eluting stents, IVUS  3.  Nonflow limiting stenosis in the vein graft to the RCA  4.  Obstructive three-vessel coronary artery disease with patent bypass grafts  5.  Mild elevation LVEDP at 18 mmHg     Recommendations:  Dual platelet therapy for 6 months     Pre-procedure diagnosis:  Recurrent angina following bypass  Post-procedure diagnosis: Same     Procedure performed  Selective coronary angiography  Left heart catheterization  Bypass graft angiography  Percutaneous coronary intervention - Stent Placement (VG to OM 2)  Intravascular Ultrasound (VG to OM 2)  Instantaneous wave free ratio (iFR) (VG to RCA)  Findings   Hemodynamics:   Aorta: 103/57 mmHg  LVEDP: 18 mmHg  No significant pullback gradient across the aortic valve     Coronary Anatomy              Left Main: Normal              LAD:  70% mid stenosis with competitive filling distally via the LIMA. No significant runoff disease. The first diagonal has 40% stenosis proximally              LCx:  MLI in the AV groove. The OM1 is small with diffuse 40% stenosis. The OM2 has 80% stenosis and fills by the diseased VG                RCA: Dominant, Occluded in the mid portion  the PDA has diffuse 40% stenosis                Grafts              LIMA-LAD: Patent              VG-OM2: 80% proximal stenosis              VG-PDA: Patent with proximal ectasia. iFR 1.0     Results of intervention to the VG-OM2:  Pre: 80% stenosis and VADIM III flow  Post: 0% residual stenosis and VADIM III flow. No dissection or distal embolization.         Assessment & Plan     1. Dyslipidemia  Lipid Profile       2. Essential hypertension, benign  Comp Metabolic Panel      3. S/P CABG x 3  Lipid Profile      4. S/P drug eluting coronary stent placement  CBC WITH DIFFERENTIAL    Lipid Profile      5. High risk medication use  CBC WITH DIFFERENTIAL      6. Atrial fibrillation, unspecified type (HCC)        7. Dysfunction of right cardiac ventricle        8. Chest tightness        9. Coronary artery disease due to lipid rich plaque            Medical Decision Making: Today's Assessment/Status/Plan:        CAD s/p CABG x3 (LIMA to LAD, RSVG to OM1, RSVG to PDA) 1/18/2024  RV dysfunction  -No further angina.  -S/p PCI LISA X2 to the OM/VG 8/6/2024  - Continue carvedilol 3.125mg BID.   - He was not interested in cardiac rehab.   - Continue atorvastatin and zetia.  -Continue DAPT with aspirin and Plavix until 8/6/2025.   - Repeat CMP, CBC, and lipid panel 8/2025     Hypertension  - Blood pressure is well controlled  - Continue at home BP monitoring.  - Continue carvedilol 3.125mg BID.      Post op afib  - Asymptomatic  - Regular rate and rhythm on exam today.  - He completed 3 months of amiodarone therapy.   - No evidence of recurrence.    - Anticoagulation not currently indicated given no prior history and elicited post op after CABG.  - Continue coreg.     Dyslipidemia  -LDL 59; goal <70  - Continue atorvastatin 80mg daily and zetia 10mg daily.   - Continue diet and lifestyle modifications.   - Repeat CMP and lipid panel 8/2025.    Follow up in 9 months or sooner with clinical changes.    Encouraged him to reach out via MyChart or telephone with any questions or concerns.    Thank you for allowing me to participate in the care of Joe Morelos .    Jessica Angel PA-C, Cardiology  [facility]  [facility], [address]  [address] [address]  Phone: [phone]  Fax: [phone]    PLEASE NOTE: This note was created using voice recognition  software. I have made every reasonable attempt to correct obvious errors, but I expect that there are errors of grammar and possibly content that I did not discover before finalizing the note.

## 2024-12-03 ENCOUNTER — APPOINTMENT (OUTPATIENT)
Dept: CARDIOLOGY | Facility: MEDICAL CENTER | Age: 69
End: 2024-12-03
Attending: STUDENT IN AN ORGANIZED HEALTH CARE EDUCATION/TRAINING PROGRAM
Payer: MEDICARE

## 2025-02-07 ENCOUNTER — OFFICE VISIT (OUTPATIENT)
Dept: MEDICAL GROUP | Facility: MEDICAL CENTER | Age: 70
End: 2025-02-07
Payer: MEDICARE

## 2025-02-07 VITALS
OXYGEN SATURATION: 95 % | HEART RATE: 75 BPM | SYSTOLIC BLOOD PRESSURE: 130 MMHG | TEMPERATURE: 96.9 F | BODY MASS INDEX: 29.96 KG/M2 | DIASTOLIC BLOOD PRESSURE: 60 MMHG | WEIGHT: 209.3 LBS | HEIGHT: 70 IN

## 2025-02-07 DIAGNOSIS — M25.561 CHRONIC PAIN OF RIGHT KNEE: ICD-10-CM

## 2025-02-07 DIAGNOSIS — Z95.5 PRESENCE OF DRUG COATED STENT IN CORONARY ARTERY: ICD-10-CM

## 2025-02-07 DIAGNOSIS — G89.29 CHRONIC PAIN OF RIGHT KNEE: ICD-10-CM

## 2025-02-07 DIAGNOSIS — Z87.11 HISTORY OF PEPTIC ULCER: ICD-10-CM

## 2025-02-07 DIAGNOSIS — N40.1 BENIGN PROSTATIC HYPERPLASIA WITH INCOMPLETE BLADDER EMPTYING: ICD-10-CM

## 2025-02-07 DIAGNOSIS — R39.14 BENIGN PROSTATIC HYPERPLASIA WITH INCOMPLETE BLADDER EMPTYING: ICD-10-CM

## 2025-02-07 DIAGNOSIS — Z00.00 ENCOUNTER FOR MEDICARE ANNUAL WELLNESS EXAM: Primary | ICD-10-CM

## 2025-02-07 DIAGNOSIS — Z95.1 HX OF CABG: ICD-10-CM

## 2025-02-07 DIAGNOSIS — I83.91 ASYMPTOMATIC VARICOSE VEINS OF RIGHT LOWER EXTREMITY: ICD-10-CM

## 2025-02-07 DIAGNOSIS — R21 RASH: ICD-10-CM

## 2025-02-07 DIAGNOSIS — I48.91 ATRIAL FIBRILLATION, UNSPECIFIED TYPE (HCC): ICD-10-CM

## 2025-02-07 DIAGNOSIS — E78.5 DYSLIPIDEMIA: ICD-10-CM

## 2025-02-07 PROBLEM — D62 ACUTE BLOOD LOSS AS CAUSE OF POSTOPERATIVE ANEMIA: Status: RESOLVED | Noted: 2024-01-18 | Resolved: 2025-02-07

## 2025-02-07 PROBLEM — T84.84XA PAINFUL ORTHOPAEDIC HARDWARE (HCC): Status: RESOLVED | Noted: 2024-07-31 | Resolved: 2025-02-07

## 2025-02-07 PROBLEM — R97.20 ELEVATED PROSTATE SPECIFIC ANTIGEN (PSA): Status: RESOLVED | Noted: 2022-08-05 | Resolved: 2025-02-07

## 2025-02-07 PROBLEM — M79.672 PAIN IN LEFT FOOT: Status: RESOLVED | Noted: 2021-10-25 | Resolved: 2025-02-07

## 2025-02-07 PROBLEM — R73.9 HYPERGLYCEMIA: Status: RESOLVED | Noted: 2020-07-28 | Resolved: 2025-02-07

## 2025-02-07 PROBLEM — K86.9 PANCREATIC LESION: Status: RESOLVED | Noted: 2020-07-28 | Resolved: 2025-02-07

## 2025-02-07 PROCEDURE — 3078F DIAST BP <80 MM HG: CPT | Performed by: STUDENT IN AN ORGANIZED HEALTH CARE EDUCATION/TRAINING PROGRAM

## 2025-02-07 PROCEDURE — G0439 PPPS, SUBSEQ VISIT: HCPCS | Mod: 25 | Performed by: STUDENT IN AN ORGANIZED HEALTH CARE EDUCATION/TRAINING PROGRAM

## 2025-02-07 PROCEDURE — 99213 OFFICE O/P EST LOW 20 MIN: CPT | Performed by: STUDENT IN AN ORGANIZED HEALTH CARE EDUCATION/TRAINING PROGRAM

## 2025-02-07 PROCEDURE — 3075F SYST BP GE 130 - 139MM HG: CPT | Performed by: STUDENT IN AN ORGANIZED HEALTH CARE EDUCATION/TRAINING PROGRAM

## 2025-02-07 RX ORDER — TAMSULOSIN HYDROCHLORIDE 0.4 MG/1
0.4 CAPSULE ORAL
Qty: 100 CAPSULE | Refills: 1 | Status: SHIPPED | OUTPATIENT
Start: 2025-02-07

## 2025-02-07 RX ORDER — NYSTATIN 100000 U/G
1 CREAM TOPICAL 2 TIMES DAILY
Qty: 30 G | Refills: 1 | Status: SHIPPED | OUTPATIENT
Start: 2025-02-07

## 2025-02-07 ASSESSMENT — PATIENT HEALTH QUESTIONNAIRE - PHQ9: CLINICAL INTERPRETATION OF PHQ2 SCORE: 0

## 2025-02-07 ASSESSMENT — FIBROSIS 4 INDEX: FIB4 SCORE: 1.26

## 2025-02-07 ASSESSMENT — ACTIVITIES OF DAILY LIVING (ADL): BATHING_REQUIRES_ASSISTANCE: 0

## 2025-02-07 ASSESSMENT — ENCOUNTER SYMPTOMS: GENERAL WELL-BEING: GOOD

## 2025-02-07 NOTE — PROGRESS NOTES
Chief Complaint   Patient presents with    Annual Exam       HPI:  Joe Morelos is a 69 y.o. here for Medicare Annual Wellness Visit     Verbal consent was acquired by the patient to use [employer] ambient listening note generation during this visit Yes   History of Present Illness  The patient presents for evaluation of peptic ulcer disease, rash, prostate enlargement, and knee pain.    He has a history of peptic ulcer disease and gastritis. He has not taken Rolaids for the past 2 weeks. Over the past 4 years, he has only taken Rolaids approximately once every 6 months. He reports no current symptoms of acid indigestion and is trying to eat healthy.    He received an injection in his knee due to bone-on-bone contact, which provided significant relief. He walked 4.5 miles 2 days ago without any knee discomfort. However, he experienced muscle strain while attempting a telma position during yoga. He plans to continue walking to alleviate the muscle strain. He went skiing 4 times last week without any knee issues, but he experiences mild knee discomfort after walking approximately 4 miles. He uses Voltaren cream, which he finds beneficial, applying it in the morning before housework and yoga.    He developed a mild cold 10 days ago, accompanied by weakness, fatigue, and body aches. He also experienced scalp pain. He has never received influenza or pneumonia vaccines and expresses regret over receiving the COVID-19 vaccine. He declines the pneumonia and shingles vaccines.    He occasionally develops a non-itchy heat rash. He also experiences raw skin on his buttocks, which he treats with Cortisone 10, and is considering using Preparation H. This issue has been recurring for the past 5 years, with occasional rawness that resolves with Cortisone application. He also reports a sore on his nose, which is improving.    He recalls an incident 3 years ago where he was unable to urinate and required catheterization at  [facility] emergency department. He was subsequently prescribed medication, which he has been taking for the past 3 years. He occasionally experiences tightness and wakes up 2 to 3 times at night to urinate, which he attributes to his high water intake. He reports a good urine flow, but notes that it slows down and stops before resuming, and ends with a trickle.    Supplemental Information  He saw his cardiologist a couple of months back and is on a yearly follow-up now. He started walking immediately the next day after his surgery. Initially, he could only walk 300 yards, but he worked up to a quarter mile and now walks 4.5 miles every day. He is trying to lose weight but is having a hard time. He called the nurse and said he was regressing. They took out 2 liters of fluid from his lungs, which was 6 pounds. He walked 3 miles right after he got out of the hospital and felt great. About 3 months later, he started getting a heavy chest again. They put 2 stents in, and he had to be on blood thinners until next August. He is on 2 medications for cholesterol, Zetia and Lipitor, and 2 antiplatelets, baby aspirin and Plavix 75 mg. He is also on carvedilol. He is on 5 medications now and will be on 4 in August. He is signed up to get his blood work done to check his cholesterol levels. He is active, walking, skiing, floating in the river, and riding his bike. He feels like he is never going to be the same. He wishes he could sleep better. He sleeps 7 hours, but it takes him 9 hours to do it. He wakes up, watches a western, and then goes back to sleep. He sleeps hard and has great dreams and intense dreams.    FAMILY HISTORY  His father had sleep problems.    MEDICATIONS  Current: Zetia, Lipitor, baby aspirin, Plavix 75 mg, carvedilol, Voltaren  Past: Prilosec    IMMUNIZATIONS  He has never taken an influenza vaccine. He has never taken a pneumonia vaccine. He declines the pneumonia and shingles vaccines.       Patient  Active Problem List    Diagnosis Date Noted    Chronic pain of right knee 02/07/2025    Asymptomatic varicose veins of right lower extremity 02/07/2025    Presence of drug coated stent in coronary artery 08/07/2024    Hx of CABG 01/29/2024    Atrial fibrillation (HCC) 01/29/2024    Atherosclerosis of native coronary artery of native heart with angina pectoris (HCC) 01/18/2024    ED (erectile dysfunction) 08/05/2022    BMI 30.0-30.9,adult 08/05/2022    Dyslipidemia 03/31/2021    History of peptic ulcer 07/28/2020       Current Outpatient Medications   Medication Sig Dispense Refill    gabapentin (NEURONTIN) 300 MG Cap 1 po q hs prn nerve pain 14 Capsule 0    clopidogrel (PLAVIX) 75 MG Tab Take 1 Tablet by mouth every day. 100 Tablet 3    atorvastatin (LIPITOR) 80 MG tablet Take 80 mg by mouth every evening.      ezetimibe (ZETIA) 10 MG Tab Take 10 mg by mouth every day.      carvedilol (COREG) 3.125 MG Tab Take 1 Tablet by mouth 2 times a day with meals. 200 Tablet 3    aspirin 81 MG EC tablet Take 1 Tablet by mouth every day. 100 Tablet 2     No current facility-administered medications for this visit.          Current supplements as per medication list.     Allergies: Patient has no known allergies.    Current social contact/activities:      He  reports that he has never smoked. He has never used smokeless tobacco. He reports current alcohol use of about 0.6 oz of alcohol per week. He reports that he does not currently use drugs.  Counseling given: Not Answered      ROS:    Gait: Uses no assistive device  Ostomy: No  Other tubes: No  Amputations: No  Chronic oxygen use: No  Last eye exam: unknown   Wears hearing aids: No   : Denies any urinary leakage during the last 6 months    Screening:  Depression Screening  Little interest or pleasure in doing things?  0 - not at all  Feeling down, depressed , or hopeless? 0 - not at all  Patient Health Questionnaire Score: 0     If depressive symptoms identified deferred to  follow up visit unless specifically addressed in assessment and plan.    Interpretation of PHQ-9 Total Score   Score Severity   1-4 No Depression   5-9 Mild Depression   10-14 Moderate Depression   15-19 Moderately Severe Depression   20-27 Severe Depression    Screening for Cognitive Impairment  Do you or any of your friends or family members have any concern about your memory? No  Three Minute Recall (Leader, Season, Table) 2/3    Erwin clock face with all 12 numbers and set the hands to show 10 minutes after 11.  Yes    Cognitive concerns identified deferred for follow up unless specifically addressed in assessment and plan.    Fall Risk Assessment  Has the patient had two or more falls in the last year or any fall with injury in the last year?  No    Safety Assessment  Do you always wear your seatbelt?  No  Any changes to home needed to function safely? No  Difficulty hearing.  Yes  Patient counseled about all safety risks that were identified.    Functional Assessment ADLs  Are there any barriers preventing you from cooking for yourself or meeting nutritional needs?  No.    Are there any barriers preventing you from driving safely or obtaining transportation?  No.    Are there any barriers preventing you from using a telephone or calling for help?  No    Are there any barriers preventing you from shopping?  No.    Are there any barriers preventing you from taking care of your own finances?  No    Are there any barriers preventing you from managing your medications?  No    Are there any barriers preventing you from showering, bathing or dressing yourself? No    Are there any barriers preventing you from doing housework or laundry? No  Are there any barriers preventing you from using the toilet?No  Are you currently engaging in any exercise or physical activity?  Yes.      Self-Assessment of Health  What is your perception of your health? Good    Do you sleep more than six hours a night? Yes    In the past 7 days,  how much did pain keep you from doing your normal work? None    Do you spend quality time with family or friends (virtually or in person)? No    Do you usually eat a heart healthy diet that constists of a variety of fruits, vegetables, whole grains and fiber? Yes    Do you eat foods high in fat and/or Fast Food more than three times per week? No    How concerned are you that your medical conditions are not being well managed? Not at all    Are you worried that in the next 2 months, you may not have stable housing that you own, rent, or stay in as part of a household? No      Advance Care Planning  Do you have an Advance Directive, Living Will, Durable Power of , or POLST? Yes    Living Will     is on file      Health Maintenance Summary            Overdue - Pneumococcal Vaccine: 65+ Years (1 of 2 - PCV) Never done      No completion history exists for this topic.              Overdue - IMM DTaP/Tdap/Td Vaccine (1 - Tdap) Never done      No completion history exists for this topic.              Overdue - Zoster (Shingles) Vaccines (1 of 2) Never done      No completion history exists for this topic.              Overdue - Influenza Vaccine (1) Never done      No completion history exists for this topic.              Overdue - COVID-19 Vaccine (3 - 2024-25 season) Overdue since 9/1/2024      04/10/2021  Imm Admin: MODERNA SARS-COV-2 VACCINE (12+)    03/12/2021  Imm Admin: MODERNA SARS-COV-2 VACCINE (12+)              Annual Wellness Visit (Yearly) Next due on 2/7/2026 02/07/2025  Visit Dx: Encounter for Medicare annual wellness exam    08/05/2022  Level of Service: LA ANNUAL WELLNESS VISIT-INCLUDES PPPS SUBSEQUE*              Colorectal Cancer Screening (Colon Cancer Screening Cologuard Stool (FIT DNA) - Preferred) Tentatively due on 10/30/2026      10/30/2023  COLOGUARD COLON CANCER SCREENING    10/30/2023  COLOGUARD RESULT component of COLOGUARD COLON CANCER SCREENING              Hepatitis C Screening   Tentatively Complete      2021  Hepatitis C Antibody component of HCV Scrn ( 8060-0826 1xLife)              Hepatitis A Vaccine (Hep A) (Series Information) Aged Out      No completion history exists for this topic.              Hepatitis B Vaccine (Hep B) (Series Information) Aged Out      No completion history exists for this topic.              HPV Vaccines (Series Information) Aged Out      No completion history exists for this topic.              Polio Vaccine (Inactivated Polio) (Series Information) Aged Out      No completion history exists for this topic.              Meningococcal Immunization (Series Information) Aged Out      No completion history exists for this topic.                    Patient Care Team:  Carrie Orozco M.D. as PCP - General (Internal Medicine)  Mirna Frey M.D. as PCP - [facility] Paneled        Social History     Tobacco Use    Smoking status: Never    Smokeless tobacco: Never   Vaping Use    Vaping status: Never Used   Substance Use Topics    Alcohol use: Yes     Alcohol/week: 0.6 oz     Types: 1 Shots of liquor per week     Comment: once a month    Drug use: Not Currently     Family History   Problem Relation Age of Onset    Heart Disease Mother         CHF    Heart Disease Father         bypass age 49, 75     He  has a past medical history of BMI 27.0-27.9,adult (2020), Cardiovascular disease, Elevated blood pressure reading in office without diagnosis of hypertension (2022), Heart burn, Hemorrhagic disorder (HCC), High cholesterol, Hyperglycemia (2020), Hypertension, Obesity (BMI 30.0-34.9) (2022), Pain in left foot (10/25/2021), Painful orthopaedic hardware (HCC) (2024), and Pancreatic lesion (2020).   Past Surgical History:   Procedure Laterality Date    HARDWARE REMOVAL ORTHO Left 2024    Procedure: LEFT FOOT REMOVAL OF HARDWARE, BURSA EXCISION;  Surgeon: Ollie Kovacs M.D.;  Location: SURGERY SAME DAY [facility];   Service: Orthopedics    BURSA EXCISION Left 11/12/2024    Procedure: BURSECTOMY;  Surgeon: Ollie Kovacs M.D.;  Location: SURGERY SAME DAY [facility];  Service: Orthopedics    MULTIPLE CORONARY ARTERY BYPASS ENDO VEIN HARVEST  1/18/2024    Procedure: CORONARY ARTERY BYPASS GRAFT X3, ENDOSCOPIC VEIN HARVEST;  Surgeon: Arnaldo Bacon D.O.;  Location: SURGERY [facility];  Service: Cardiothoracic    ECHOCARDIOGRAM, TRANSESOPHAGEAL, INTRAOPERATIVE  1/18/2024    Procedure: ECHOCARDIOGRAM, TRANSESOPHAGEAL, INTRAOPERATIVE;  Surgeon: Arnaldo Bacon D.O.;  Location: SURGERY [facility];  Service: Cardiothoracic    ENDARTERECTOMY  1/18/2024    Procedure: ENDARTERECTOMY- RIGHT CORONARY ARTERY;  Surgeon: Arnaldo Bacon D.O.;  Location: SURGERY [facility];  Service: Cardiothoracic    PB RECONSTRUC TOE DEFORM,SOFT TISSUE Left 12/30/2021    Procedure: LEFT FOOT HALLUX VALGUS CORRECTION, LEFT AKIN OSTEOTOMY,;  Surgeon: Ollie Kovacs M.D.;  Location: [facility];  Service: Orthopedics    DC COR HLX VLGS PRX PHLX OSTEOT Left 12/30/2021    Procedure: LEFT DISTAL SOFT TISSUE PROCEDURE;  Surgeon: Ollie Kovacs M.D.;  Location: [facility];  Service: Orthopedics    PB OSTEOTOMY METATARSALS,MULTIPLE Left 12/30/2021    Procedure: LEFT SECOND, THIRD DISTAL METATARSAL OSTEOTOMY;  Surgeon: Ollie Kovacs M.D.;  Location: [facility];  Service: Orthopedics    PB PART EXCIS 5TH METATARSAL HEAD Left 12/30/2021    Procedure: LEFT SECOND, THIRD METATARSOPHALANGEAL JOINT RECONSTRUCTION;  Surgeon: Ollie Kovacs M.D.;  Location: [facility];  Service: Orthopedics    PB REPAIR OF HAMMERTOE,ONE Left 12/30/2021    Procedure: LEFT SECOND AND THIRD HAMMERTOE CORRECTION;  Surgeon: Ollie Kovacs M.D.;  Location: [facility];  Service: Orthopedics    PB EXCIS INTERDIGITAL NEUROMA,EA Left 12/30/2021    Procedure: LEFT  "SECOND WEBSPACE NEUROMA EXCISION;  Surgeon: Ollie Kovacs M.D.;  Location: [facility];  Service: Orthopedics       Exam:   /60 (BP Location: Left arm, Patient Position: Sitting, BP Cuff Size: Adult long)   Pulse 75   Temp 36.1 °C (96.9 °F) (Temporal)   Ht 1.778 m (5' 10\")   Wt 94.9 kg (209 lb 4.8 oz)   SpO2 95%  Body mass index is 30.03 kg/m².    Hearing fair.    Dentition poor  Alert, oriented in no acute distress.  Eye contact is good, speech goal directed, affect calm    Assessment and Plan. The following treatment and monitoring plan is recommended:      1. Encounter for Medicare annual wellness exam      Health Maintenance Due   Topic Date Due    Pneumococcal Vaccine (1 of 2 - PCV) Never done    Diphtheria, Tetanus, Pertussis Immunization (DTaP/Tdap/Td) (1 - Tdap) Never done    Zoster (Shingles) Vaccine (1 of 2) Never done    Flu Shot (1) Never done    COVID-19 Vaccine (3 - 2024-25 season) 09/01/2024     Recommended vaccinations - Counseling and education provided . patient declined all the vaccines.     Services suggested: No services needed at this time  Health Care Screening: Age-appropriate preventive services recommended by USPTF and ACIP covered by Medicare were discussed today. Services ordered if indicated and agreed upon by the patient.  Referrals offered: Community-based lifestyle interventions to reduce health risks and promote self-management and wellness, fall prevention, nutrition, physical activity, tobacco-use cessation, weight loss, and mental health services as per orders if indicated.    Discussion today about general wellness and lifestyle habits:    Prevent falls and reduce trip hazards; Cautioned about securing or removing rugs.  Have a working fire alarm and carbon monoxide detector;   Engage in regular physical activity and social activities         2. Atrial fibrillation, unspecified type (HCC)    HCC Gap Form    Diagnosis to address: I48.91 - Atrial " fibrillation, unspecified type (HCC)  Assessment and plan: Chronic, stable.  Developed in post op settings. Currently in sinus rate and rhythm. Asymptomatic  He completed 3 months of amiodarone therapy.   No evidence of recurrence.    Anticoagulation not currently indicated given no prior history and elicited post op after CABG.  Continue current medication coreg 3.125 mg BID and antiplatelets.  Follow-up with specialist as directed, but at least annually.  Last edited 02/07/25 07:52 PST by Carrie Orozco M.D.         3. Chronic pain of right knee  Chronic, stable  arthritis /wear-and-tear of knee joint  A referral to orthopedics was made last visit   seen by [facility]   Received intra-articular steroid injection.   Continue conservative measures   Continue f/u [facility]/orthopedics.     4. Asymptomatic varicose veins of right lower extremity      5. Dyslipidemia  Chronic, stable         Lab Results   Component Value Date/Time     CHOLSTRLTOT 123 09/24/2024 0604     TRIGLYCERIDE 96 09/24/2024 0604     HDL 45 09/24/2024 0604     LDL 59 09/24/2024 0604   The ASCVD Risk score -11.3%  Continue Lipitor 80 mg and Zetia 10 mg   Recommended to continue low fat healthy diet       6. Hx of CABG  Stable   Patient with history of multivessel coronary artery disease now status post CABG three-vessel.   current medications include DAPT therapy with aspirin 81 mg daily and Plavix 75 mg.  He is also on Coreg 3.125 mg twice daily, Lipitor 40 mg daily.    7. Presence of drug coated stent in coronary artery  Chronic, stable   With coronary artery disease status post CABG 3 vessels in January 2024 with recent reoccurrence of chest pain angiogram done and has 2 stents placed on 8/6/2024.  Plan  Continue following up with cardiology  Recommended to follow instructions and recommendations per cardiology  Continue Lipitor 80 mg, Zetia 10 mg, aspirin 81 mg and Plavix 75 mg daily  Continue f/u with cardiology      8. History of peptic ulcer  Chronic,  stable  Remote history of peptic ulcer in July 2020 when he was on PPI therapy and following GI.  Follow-up EGD showed resolution of ulcer.  Currently no acute concerns  Patient was on Prilosec 20 mg once daily but reports has no symptoms so has stopped.     9. Rash  New problem   Rash in groin region  Plan:  - nystatin (MYCOSTATIN) [id number] UNIT/GM Cream topical cream; Apply 1 g topically 2 times a day.  Dispense: 30 g; Refill: 1    10. BMI 30.0-30.9,adult  - Patient identified as having weight management issue.  Appropriate orders and counseling given.    11. Benign prostatic hyperplasia with incomplete bladder emptying  New concern   Reports urinary hesitancy and incomplete emptying In past, had similar symptoms temporarily when( about 3 years back ) when he was given flomax which helped.   Requesting for prescription for symptom relief.      Plan:  Recommended flomax 0.4 mg daily   - tamsulosin (FLOMAX) 0.4 MG capsule; Take 1 Capsule by mouth 1/2 hour after breakfast.  Dispense: 100 Capsule; Refill: 1      Follow-up: 6 months

## 2025-02-18 ENCOUNTER — TELEPHONE (OUTPATIENT)
Dept: CARDIOLOGY | Facility: MEDICAL CENTER | Age: 70
End: 2025-02-18
Payer: MEDICARE

## 2025-02-18 DIAGNOSIS — E78.5 DYSLIPIDEMIA: ICD-10-CM

## 2025-02-18 RX ORDER — ATORVASTATIN CALCIUM 80 MG/1
80 TABLET, FILM COATED ORAL NIGHTLY
Qty: 30 TABLET | Refills: 0 | Status: SHIPPED | OUTPATIENT
Start: 2025-02-18

## 2025-03-09 ENCOUNTER — TELEPHONE (OUTPATIENT)
Dept: CARDIOLOGY | Facility: MEDICAL CENTER | Age: 70
End: 2025-03-09
Payer: MEDICARE

## 2025-03-09 DIAGNOSIS — E78.5 DYSLIPIDEMIA: ICD-10-CM

## 2025-03-10 RX ORDER — ATORVASTATIN CALCIUM 80 MG/1
80 TABLET, FILM COATED ORAL EVERY EVENING
Qty: 100 TABLET | Refills: 0 | Status: SHIPPED | OUTPATIENT
Start: 2025-03-10

## 2025-03-10 NOTE — TELEPHONE ENCOUNTER
Phone Number Called: [phone]     Call outcome: Did not leave a detailed message. Requested patient to call back.    Message: Called to clarify prior request for refills.    Will send refill per SCP for now

## 2025-03-11 ENCOUNTER — TELEPHONE (OUTPATIENT)
Dept: CARDIOLOGY | Facility: MEDICAL CENTER | Age: 70
End: 2025-03-11
Payer: MEDICARE

## 2025-03-11 NOTE — TELEPHONE ENCOUNTER
Phone number called: [phone]    Call outcome: I tried to contact pt but there was no answer. Left detailed VM that his RX for atorvastatin has been sent to his pharmacy and to contact our office for further questions.

## 2025-03-11 NOTE — TELEPHONE ENCOUNTER
Caller: Joe Morelos     Topic/issue: Patient is calling back for clarification    Callback Number: [phone]     Thank you,  Delmi puentes

## 2025-03-11 NOTE — TELEPHONE ENCOUNTER
AM  [facility] CALL    PT CALLED [facility] REQUESTING CLARIFICATION FOR HIS NEW MEDICATION, ATORVASTATIN. HE DON'T UNDERSTAND WHY HE NEEDS TO TAKE THIS MEDICATION.    HE WILL LIKE TO GIVE HIM A CALL BACK AFTER 4:00PM PLEASE.    THANK YOU

## 2025-03-11 NOTE — TELEPHONE ENCOUNTER
I spoke to pt he wants all of his medications to be refilled all at the same time so he only has to go to pharmacy once. Pt told that he has to talk to his pharmacy about it and see if they can set up all of his medication for refill all at the same time.

## 2025-05-11 DIAGNOSIS — E78.5 DYSLIPIDEMIA: ICD-10-CM

## 2025-05-11 DIAGNOSIS — Z95.1 S/P CABG X 3: ICD-10-CM

## 2025-05-12 ENCOUNTER — TELEPHONE (OUTPATIENT)
Dept: CARDIOLOGY | Facility: MEDICAL CENTER | Age: 70
End: 2025-05-12
Payer: MEDICARE

## 2025-05-12 DIAGNOSIS — Z95.1 S/P CABG X 3: ICD-10-CM

## 2025-05-12 DIAGNOSIS — E78.5 DYSLIPIDEMIA: ICD-10-CM

## 2025-05-12 RX ORDER — CARVEDILOL 3.12 MG/1
3.12 TABLET ORAL 2 TIMES DAILY WITH MEALS
Qty: 200 TABLET | Refills: 1 | Status: SHIPPED | OUTPATIENT
Start: 2025-05-12

## 2025-05-12 RX ORDER — EZETIMIBE 10 MG/1
10 TABLET ORAL DAILY
Qty: 100 TABLET | Refills: 1 | Status: SHIPPED | OUTPATIENT
Start: 2025-05-12

## 2025-05-12 NOTE — ADDENDUM NOTE
Addended by: ABHIJEET ULLOA on: 5/12/2025 10:05 AM     Modules accepted: Orders     Detail Level: Simple Render Risk Assessment In Note?: no Comment: Pt has Hx of high blood pressure and checked with PCP about spironolactone. Per PCP ok to substitute spironolactone for amlodipine. Pt denies SE of alysa but interested in increasing dose - recommended to discuss with PCP but would ideally do 100mg qhs + 25mg qam x2 weeks; if tolerated, ok to increase AM dose to 50mg daily  \\n-hx sensitive skin - add clindamycin and sulfur wash

## 2025-05-12 NOTE — TELEPHONE ENCOUNTER
AM  Caller: oJe Morelos     Topic/issue: Patient retuning phone call.    Callback Number: [phone]     Thank you  Ines MCCRAY

## 2025-05-12 NOTE — TELEPHONE ENCOUNTER
Phone Number Called: [phone]     Call outcome: Spoke to patient regarding message below.    Message: Called to discuss medications. Sent medications that were needed to patients pharmacy. Patient appreciative of phone call.

## 2025-05-12 NOTE — TELEPHONE ENCOUNTER
AM  [facility]    PT CALLED [facility] STATING THAT HE HAS QUESTIONS ABOUT HIS MEDICATION & WENT AND TRY TO GET HIS MEDICATION(CARVEDILOL & ATORVASTATIN) AT THE PHARMACIST BUT, THEY TOLD HIM COULDN'T GET REFILL. TO CONSULT WITH HIS PROVIDER. HE WILL LIKE TO GIVE HIM A CALL AFTER 3:00PM.     THANK YOU!

## 2025-05-12 NOTE — TELEPHONE ENCOUNTER
Phone Number Called:  [phone] (No voicemail option)/[phone]    Call outcome: Did not leave a detailed message. Requested patient to call back.    Message: Called to discuss medication concerns. Advised patient to call main cardiology office. Awaiting phone call back      Coreg prescription sent to [facility]

## 2025-05-13 RX ORDER — EZETIMIBE 10 MG/1
10 TABLET ORAL
Qty: 90 TABLET | Refills: 3 | OUTPATIENT
Start: 2025-05-13

## 2025-05-13 RX ORDER — CARVEDILOL 3.12 MG/1
3.12 TABLET ORAL 2 TIMES DAILY WITH MEALS
Qty: 200 TABLET | Refills: 3 | OUTPATIENT
Start: 2025-05-13

## 2025-05-13 RX ORDER — ATORVASTATIN CALCIUM 80 MG/1
80 TABLET, FILM COATED ORAL EVERY EVENING
Qty: 100 TABLET | Refills: 1 | Status: SHIPPED | OUTPATIENT
Start: 2025-05-13

## 2025-05-13 NOTE — TELEPHONE ENCOUNTER
Rx refill for Atorvastatin updated to 100 days/SCP and sent to preferred pharmacy. Last OV 11/20/24. Current labs. Next OV 8/7/25.

## 2025-05-28 PROBLEM — M17.11 DEGENERATIVE ARTHRITIS OF RIGHT KNEE: Status: ACTIVE | Noted: 2025-05-28

## 2025-06-14 NOTE — TELEPHONE ENCOUNTER
AM    PT CALLED IN ON PUMPLINE REQUESTING REFILL FOR atorvastatin (LIPITOR) 80 MG tablet. HE WOULD LIKE A 30 DAY, ALSO REQUESTING IF WE CAN DO 60DAY REFILLS AFTER THIS ONE SO HE CAN PICK ALL MEDS UP AT SAME TIME.  [facility] ON [address], IN [place].  IF YOU NEED TO CALL PT HE REQ TO BE CALLED BACK AFTER 3PM TODAY.    THANK YOU!   
RX sent to preferred pharmacy.   
103.1

## 2025-07-22 ENCOUNTER — HOSPITAL ENCOUNTER (OUTPATIENT)
Dept: LAB | Facility: MEDICAL CENTER | Age: 70
End: 2025-07-22
Attending: PHYSICIAN ASSISTANT
Payer: MEDICARE

## 2025-07-22 DIAGNOSIS — E78.5 DYSLIPIDEMIA: ICD-10-CM

## 2025-07-22 DIAGNOSIS — Z95.5 S/P DRUG ELUTING CORONARY STENT PLACEMENT: ICD-10-CM

## 2025-07-22 DIAGNOSIS — I10 ESSENTIAL HYPERTENSION, BENIGN: ICD-10-CM

## 2025-07-22 DIAGNOSIS — Z79.899 HIGH RISK MEDICATION USE: ICD-10-CM

## 2025-07-22 DIAGNOSIS — Z95.1 S/P CABG X 3: ICD-10-CM

## 2025-07-22 LAB
ALBUMIN SERPL BCP-MCNC: 4.4 G/DL (ref 3.2–4.9)
ALBUMIN/GLOB SERPL: 1.4 G/DL
ALP SERPL-CCNC: 83 U/L (ref 30–99)
ALT SERPL-CCNC: 39 U/L (ref 2–50)
ANION GAP SERPL CALC-SCNC: 12 MMOL/L (ref 7–16)
AST SERPL-CCNC: 32 U/L (ref 12–45)
BASOPHILS # BLD AUTO: 0.6 % (ref 0–1.8)
BASOPHILS # BLD: 0.03 K/UL (ref 0–0.12)
BILIRUB SERPL-MCNC: 0.8 MG/DL (ref 0.1–1.5)
BUN SERPL-MCNC: 16 MG/DL (ref 8–22)
CALCIUM ALBUM COR SERPL-MCNC: 9.1 MG/DL (ref 8.5–10.5)
CALCIUM SERPL-MCNC: 9.4 MG/DL (ref 8.5–10.5)
CHLORIDE SERPL-SCNC: 102 MMOL/L (ref 96–112)
CHOLEST SERPL-MCNC: 108 MG/DL (ref 100–199)
CO2 SERPL-SCNC: 23 MMOL/L (ref 20–33)
CREAT SERPL-MCNC: 0.76 MG/DL (ref 0.5–1.4)
EOSINOPHIL # BLD AUTO: 0.12 K/UL (ref 0–0.51)
EOSINOPHIL NFR BLD: 2.3 % (ref 0–6.9)
ERYTHROCYTE [DISTWIDTH] IN BLOOD BY AUTOMATED COUNT: 43.6 FL (ref 35.9–50)
GFR SERPLBLD CREATININE-BSD FMLA CKD-EPI: 97 ML/MIN/1.73 M 2
GLOBULIN SER CALC-MCNC: 3.2 G/DL (ref 1.9–3.5)
GLUCOSE SERPL-MCNC: 110 MG/DL (ref 65–99)
HCT VFR BLD AUTO: 49.9 % (ref 42–52)
HDLC SERPL-MCNC: 37 MG/DL
HGB BLD-MCNC: 16.4 G/DL (ref 14–18)
IMM GRANULOCYTES # BLD AUTO: 0.02 K/UL (ref 0–0.11)
IMM GRANULOCYTES NFR BLD AUTO: 0.4 % (ref 0–0.9)
LDLC SERPL CALC-MCNC: 55 MG/DL
LYMPHOCYTES # BLD AUTO: 1.15 K/UL (ref 1–4.8)
LYMPHOCYTES NFR BLD: 22.2 % (ref 22–41)
MCH RBC QN AUTO: 29.7 PG (ref 27–33)
MCHC RBC AUTO-ENTMCNC: 32.9 G/DL (ref 32.3–36.5)
MCV RBC AUTO: 90.2 FL (ref 81.4–97.8)
MONOCYTES # BLD AUTO: 0.36 K/UL (ref 0–0.85)
MONOCYTES NFR BLD AUTO: 6.9 % (ref 0–13.4)
NEUTROPHILS # BLD AUTO: 3.51 K/UL (ref 1.82–7.42)
NEUTROPHILS NFR BLD: 67.6 % (ref 44–72)
NRBC # BLD AUTO: 0 K/UL
NRBC BLD-RTO: 0 /100 WBC (ref 0–0.2)
PLATELET # BLD AUTO: 302 K/UL (ref 164–446)
PMV BLD AUTO: 9.1 FL (ref 9–12.9)
POTASSIUM SERPL-SCNC: 4.7 MMOL/L (ref 3.6–5.5)
PROT SERPL-MCNC: 7.6 G/DL (ref 6–8.2)
RBC # BLD AUTO: 5.53 M/UL (ref 4.7–6.1)
SODIUM SERPL-SCNC: 137 MMOL/L (ref 135–145)
TRIGL SERPL-MCNC: 82 MG/DL (ref 0–149)
WBC # BLD AUTO: 5.2 K/UL (ref 4.8–10.8)

## 2025-07-22 PROCEDURE — 85025 COMPLETE CBC W/AUTO DIFF WBC: CPT

## 2025-07-22 PROCEDURE — 80061 LIPID PANEL: CPT

## 2025-07-22 PROCEDURE — 36415 COLL VENOUS BLD VENIPUNCTURE: CPT

## 2025-07-22 PROCEDURE — 80053 COMPREHEN METABOLIC PANEL: CPT

## 2025-07-25 ENCOUNTER — RESULTS FOLLOW-UP (OUTPATIENT)
Dept: CARDIOLOGY | Facility: MEDICAL CENTER | Age: 70
End: 2025-07-25
Payer: MEDICARE

## 2025-07-31 ENCOUNTER — TELEPHONE (OUTPATIENT)
Dept: CARDIOLOGY | Facility: MEDICAL CENTER | Age: 70
End: 2025-07-31
Payer: MEDICARE

## 2025-07-31 NOTE — TELEPHONE ENCOUNTER
Chart prep:    All labs ordered on LOV have been completed for pt's upcoming appt with AM on 8/7.

## 2025-08-07 ENCOUNTER — APPOINTMENT (OUTPATIENT)
Dept: CARDIOLOGY | Facility: MEDICAL CENTER | Age: 70
End: 2025-08-07
Attending: PHYSICIAN ASSISTANT
Payer: MEDICARE

## 2025-08-07 ENCOUNTER — APPOINTMENT (OUTPATIENT)
Dept: MEDICAL GROUP | Facility: MEDICAL CENTER | Age: 70
End: 2025-08-07
Payer: MEDICARE

## 2025-08-07 ASSESSMENT — FIBROSIS 4 INDEX: FIB4 SCORE: 1.17

## 2025-08-13 ENCOUNTER — TELEPHONE (OUTPATIENT)
Dept: MEDICAL GROUP | Facility: MEDICAL CENTER | Age: 70
End: 2025-08-13
Payer: MEDICARE

## 2025-08-13 DIAGNOSIS — E78.5 DYSLIPIDEMIA: ICD-10-CM

## 2025-08-13 PROCEDURE — RXMED WILLOW AMBULATORY MEDICATION CHARGE: Performed by: PHYSICIAN ASSISTANT

## 2025-08-13 RX ORDER — ATORVASTATIN CALCIUM 80 MG/1
80 TABLET, FILM COATED ORAL EVERY EVENING
Qty: 100 TABLET | Refills: 1 | Status: SHIPPED | OUTPATIENT
Start: 2025-08-13

## 2025-08-14 ENCOUNTER — PHARMACY VISIT (OUTPATIENT)
Dept: PHARMACY | Facility: MEDICAL CENTER | Age: 70
End: 2025-08-14
Payer: COMMERCIAL

## (undated) DEVICE — SYRINGE NON SAFETY 3 CC 21 GA X 1 1/2 IN (100/BX 8BX/CA)

## (undated) DEVICE — TOURNIQUET CUFF 24 X 4 ONE PORT - STERILE (10/BX)

## (undated) DEVICE — BLADE STERNUM SAW SURGICAL 32.0 X 6.4 MM STERILE (1/EA)

## (undated) DEVICE — SLEEVE, VASO, THIGH, MED

## (undated) DEVICE — FIBRILLAR SURGICEL 4X4 - 10/CA

## (undated) DEVICE — KIT RADIAL ARTERY 20GA W/MAX BARRIER AND BIOPATCH  (5EA/CA) #10740 IS FOR THE SET RADIAL ARTERIAL

## (undated) DEVICE — SODIUM CHL. INJ. 0.9% 500ML (24EA/CA 50CA/PF)

## (undated) DEVICE — KIT TOURNIQUET DLP (40EA/PK)

## (undated) DEVICE — GLOVE BIOGEL PI ORTHO SZ 7.5 PF LF (40PR/BX)

## (undated) DEVICE — SUTURE 2-0 VICRYL PLUS CT-1 36 (36PK/BX)"

## (undated) DEVICE — MICRODRIP PRIMARY VENTED 60 (48EA/CA) THIS WAS PART #2C8428 WHICH WAS DISCONTINUED

## (undated) DEVICE — KIT ENDOHARVEST SYSTEM 8 - MUST ORDER 5 AT A TIME

## (undated) DEVICE — BAG SPONGE COUNT 10.25 X 32 - BLUE (250/CA)

## (undated) DEVICE — D-5-W INJ. 500 ML - (24EA/CA)

## (undated) DEVICE — SYSTEM CHEST DRAIN ADULT/PEDS W/AUTO TRANSFUSION CAPABILITY SAHARA (6EA/CA)

## (undated) DEVICE — PACK CV DRAPING/BASIN 2PART - (1/CA)

## (undated) DEVICE — SET FLUID WARMING STANDARD FLOW - (10/CA)

## (undated) DEVICE — BLADE BEAVER 6900 MINI SHARP ALL AROUND (20/CA)

## (undated) DEVICE — BLOWER/MISTER (5EA/PK)

## (undated) DEVICE — Device

## (undated) DEVICE — DRESSING 3X8 ADAPTIC GAUZE - NON-ADHERING (36/PK 6PK/BX)

## (undated) DEVICE — GLOVE BIOGEL SZ 8.5 SURGICAL PF LTX - (50PR/BX 4BX/CA)

## (undated) DEVICE — GOWN SURGICAL XX-LARGE - (28EA/CA) SIRUS NON REINFORCED

## (undated) DEVICE — DERMABOND ADVANCED - (12EA/BX)

## (undated) DEVICE — TUBE CHEST 32FR. RIGHT ANGLED (10EA/CA)

## (undated) DEVICE — GLOVE BIOGEL PI INDICATOR SZ 6.5 SURGICAL PF LF - (50/BX 4BX/CA)

## (undated) DEVICE — LACTATED RINGERS INJ 1000 ML - (14EA/CA 60CA/PF)

## (undated) DEVICE — SUTURE 3-0 ETHILON FS-1 - (36/BX) 30 INCH

## (undated) DEVICE — GLOVE BIOGEL SZ 7.5 SURGICAL PF LTX - (50PR/BX 4BX/CA)

## (undated) DEVICE — SET EXTENSION WITH 2 PORTS (48EA/CA) ***PART #2C8610 IS A SUBSTITUTE*****

## (undated) DEVICE — DECANTER FLD BLS - (50/CA)

## (undated) DEVICE — SOD. CHL. INJ. 0.9% 1000 ML - (14EA/CA 60CA/PF)

## (undated) DEVICE — GLOVE BIOGEL PI INDICATOR SZ 7.0 SURGICAL PF LF - (50/BX 4BX/CA)

## (undated) DEVICE — SET LEADWIRE 5 LEAD BEDSIDE DISPOSABLE ECG (1SET OF 5/EA)

## (undated) DEVICE — TRAY MULTI-LUMEN 7FR PRESSURE W/MAX BARRIER AND BIOPATCH - (5/CA)

## (undated) DEVICE — SUTURE 5 SURGICAL STEEL V-40 - (12/BX) CCS CURRENT

## (undated) DEVICE — SODIUM CHL IRRIGATION 0.9% 1000ML (12EA/CA)

## (undated) DEVICE — PTFE PLED STER - (250/CA)

## (undated) DEVICE — PACK VEIN - (19/CA)

## (undated) DEVICE — TOWELS CLOTH SURGICAL - (4/PK 20PK/CA)

## (undated) DEVICE — ELECTRODE DUAL RETURN W/ CORD - (50/PK)

## (undated) DEVICE — STOPCOCK MALE 4-WAY - (50/CA)

## (undated) DEVICE — SENSOR OXIMETER ADULT SPO2 RD SET (20EA/BX)

## (undated) DEVICE — SUTURE GENERAL

## (undated) DEVICE — GLOVE BIOGEL INDICATOR SZ 6.5 SURGICAL PF LTX - (50PR/BX 4BX/CA)

## (undated) DEVICE — INSERT STEALTH #5 - (10/BX)

## (undated) DEVICE — SET BIFURCATED BLOOD - (48EA/CS)

## (undated) DEVICE — GLOVE SZ 7 BIOGEL PI MICRO - PF LF (50PR/BX 4BX/CA)

## (undated) DEVICE — TUBING INSUFFLATION - (10/BX)

## (undated) DEVICE — PACK LOWER EXTREMITY - (2/CA)

## (undated) DEVICE — BANDAGE ELASTIC 4 HONEYCOMB - 4"X5YD LF (20/CA)"

## (undated) DEVICE — SUTURE 7-0 PROLENE 24BV175-6 - EP8735H (36/BX)"

## (undated) DEVICE — CANISTER SUCTION 3000ML MECHANICAL FILTER AUTO SHUTOFF MEDI-VAC NONSTERILE LF DISP (40EA/CA)

## (undated) DEVICE — COVER LIGHT HANDLE ALC PLUS DISP (18EA/BX)

## (undated) DEVICE — PADDING CAST 4 IN X 4 YDS - SOF-ROLL (12RL/BG 6BG/CT)

## (undated) DEVICE — CLIP SM INTNL HRZN TI ESCP LGT - (24EA/PK 25PK/BX)

## (undated) DEVICE — BLADE SURGICAL #15 - (50/BX 3BX/CA)

## (undated) DEVICE — GLOVE BIOGEL INDICATOR SZ 7.5 SURGICAL PF LTX - (50PR/BX 4BX/CA)

## (undated) DEVICE — GLOVE BIOGEL SZ 8 SURGICAL PF LTX - (50PR/BX 4BX/CA)

## (undated) DEVICE — SUTURE 3-0 MONOCRYL PLUS PS-1 - 27 INCH (36/BX)

## (undated) DEVICE — SENSOR CEREBRAL AND SOMATIC MONITORING (20/CA)

## (undated) DEVICE — GLOVE BIOGEL INDICATOR SZ 8.5 SURGICAL PF LTX - (50/BX 4BX/CA)

## (undated) DEVICE — LEAD PACING TEMP MYO - (12/BX)

## (undated) DEVICE — TUBING CLEARLINK DUO-VENT - C-FLO (48EA/CA)

## (undated) DEVICE — CORETEMP DRAPE FORM-FITTED EASY DROPANDGO DRAPE FOR USE ON THE CORETEMP FLUID MANAGEMENT 56IN X 56IN

## (undated) DEVICE — GLOVE SIZE 6.5  SURGEON ACCELERATOR FREE GREEN (50PR/BX)

## (undated) DEVICE — SET TUBING PNEUMOCLEAR HIGH FLOW SMOKE EVACUATION (10EA/BX)

## (undated) DEVICE — RETRACTOR OFF PUMP OCTO ONLY - 10/BX

## (undated) DEVICE — SUCTION INSTRUMENT YANKAUER BULBOUS TIP W/O VENT (50EA/CA)

## (undated) DEVICE — TUBE CHEST 32FR. STRAIGHT - (10EA/CA)

## (undated) DEVICE — SUTURE 6-0 PROLENE RB-2 D/A 30 (36PK/BX)"

## (undated) DEVICE — GLOVE BIOGEL INDICATOR SZ 9 SURGICAL PF LTX - (160/CA)

## (undated) DEVICE — MASK AIRWAY FLEXIBLE SINGLE-USE SIZE 4 ADULTS (10EA/BX)

## (undated) DEVICE — SPRING BULLDOG 1/2 FORCE BLUE - (10/BX)

## (undated) DEVICE — PUNCH 4MM SHRP CNCL TIP DL - (6/BX)

## (undated) DEVICE — SUTURE OHS

## (undated) DEVICE — GOWN WARMING STANDARD FLEX - (30/CA)

## (undated) DEVICE — SUTURE 3-0 PROLENE SH 36 (36PK/BX)"

## (undated) DEVICE — BAG RESUSCITATION DISPOSABLE - WITH MASK (10 EA/CA)

## (undated) DEVICE — STAPLER SKIN DISP - (6/BX 10BX/CA) VISISTAT

## (undated) DEVICE — SUTURE 4-0 30CM STRATAFIX SPIRAL PS-2 (12EA/BX)

## (undated) DEVICE — DRAPE LARGE 3 QUARTER - (20/CA)

## (undated) DEVICE — BLADE ELECTRODE EDGE INSULATED 4 IN (50EA/BX)

## (undated) DEVICE — DRAPE 36X28IN RAD CARM BND BG - (25/CA) O

## (undated) DEVICE — PAD LAP STERILE 18 X 18 - (5/PK 40PK/CA)

## (undated) DEVICE — GLOVE BIOGEL SZ 6.5 SURGICAL PF LTX (50PR/BX 4BX/CA)

## (undated) DEVICE — TUBE E-T HI-LO CUFF 7.5MM (10EA/PK)

## (undated) DEVICE — CHLORAPREP 26 ML APPLICATOR - ORANGE TINT(25/CA)

## (undated) DEVICE — TRAY SURESTEP FOLEY TEMP SENSING 16FR (10EA/CA) ORDER  #18764 FOR TEMP FOLEY ONLY

## (undated) DEVICE — CANISTER SUCTION 3000ML MECHANICAL FILTER AUTO SHUTOFF MEDI-VAC NONSTERILE LF DISP  (40EA/CA)

## (undated) DEVICE — TRANSDUCER BIFURCATED MONITORING KIT (10EA/CA)

## (undated) DEVICE — GOWN SURGEONS X-LARGE - DISP. (30/CA)